# Patient Record
Sex: FEMALE | Race: WHITE | Employment: FULL TIME | URBAN - METROPOLITAN AREA
[De-identification: names, ages, dates, MRNs, and addresses within clinical notes are randomized per-mention and may not be internally consistent; named-entity substitution may affect disease eponyms.]

---

## 2017-04-08 ENCOUNTER — HOSPITAL ENCOUNTER (OUTPATIENT)
Age: 52
Discharge: HOME | End: 2017-04-08
Payer: COMMERCIAL

## 2017-04-08 LAB
-: ABNORMAL
ABSOLUTE EOS #: 0.2 K/UL (ref 0–0.4)
ABSOLUTE LYMPH #: 1.6 K/UL (ref 1–4.8)
ABSOLUTE MONO #: 0.4 K/UL (ref 0.2–0.8)
ALBUMIN SERPL-MCNC: 4.5 G/DL (ref 3.5–5.2)
ALBUMIN/GLOBULIN RATIO: ABNORMAL (ref 1–2.5)
ALP BLD-CCNC: 71 U/L (ref 35–104)
ALT SERPL-CCNC: 17 U/L (ref 5–33)
AMORPHOUS: ABNORMAL
ANION GAP SERPL CALCULATED.3IONS-SCNC: 10 MMOL/L (ref 9–17)
AST SERPL-CCNC: 22 U/L
BACTERIA: ABNORMAL
BASOPHILS # BLD: 0 % (ref 0–2)
BASOPHILS ABSOLUTE: 0 K/UL (ref 0–0.2)
BILIRUB SERPL-MCNC: 0.29 MG/DL (ref 0.3–1.2)
BILIRUBIN URINE: NEGATIVE
BUN BLDV-MCNC: 18 MG/DL (ref 6–20)
BUN/CREAT BLD: 24 (ref 9–20)
CALCIUM SERPL-MCNC: 9.7 MG/DL (ref 8.6–10.4)
CASTS UA: ABNORMAL /LPF
CHLORIDE BLD-SCNC: 104 MMOL/L (ref 98–107)
CHOLESTEROL/HDL RATIO: 2.7
CHOLESTEROL: 191 MG/DL
CO2: 28 MMOL/L (ref 20–31)
COLOR: YELLOW
COMMENT UA: ABNORMAL
CREAT SERPL-MCNC: 0.76 MG/DL (ref 0.5–0.9)
CRYSTALS, UA: ABNORMAL /HPF
DIFFERENTIAL TYPE: ABNORMAL
EOSINOPHILS RELATIVE PERCENT: 4 % (ref 1–4)
EPITHELIAL CELLS UA: ABNORMAL /HPF
GFR AFRICAN AMERICAN: >60 ML/MIN
GFR NON-AFRICAN AMERICAN: >60 ML/MIN
GFR SERPL CREATININE-BSD FRML MDRD: ABNORMAL ML/MIN/{1.73_M2}
GFR SERPL CREATININE-BSD FRML MDRD: ABNORMAL ML/MIN/{1.73_M2}
GLUCOSE BLD-MCNC: 92 MG/DL (ref 70–99)
GLUCOSE URINE: NEGATIVE
HCT VFR BLD CALC: 43.2 % (ref 36–46)
HDLC SERPL-MCNC: 70 MG/DL
HEMOGLOBIN: 14.4 G/DL (ref 12–16)
KETONES, URINE: NEGATIVE
LDL CHOLESTEROL: 114 MG/DL (ref 0–130)
LEUKOCYTE ESTERASE, URINE: ABNORMAL
LYMPHOCYTES # BLD: 36 % (ref 24–44)
MCH RBC QN AUTO: 27.8 PG (ref 26–34)
MCHC RBC AUTO-ENTMCNC: 33.2 G/DL (ref 31–37)
MCV RBC AUTO: 83.7 FL (ref 80–100)
MONOCYTES # BLD: 10 % (ref 1–7)
MUCUS: ABNORMAL
NITRITE, URINE: NEGATIVE
OTHER OBSERVATIONS UA: ABNORMAL
PDW BLD-RTO: 16 % (ref 11.5–14.5)
PH UA: 7 (ref 5–8)
PLATELET # BLD: 266 K/UL (ref 130–400)
PLATELET ESTIMATE: ABNORMAL
PMV BLD AUTO: 8.3 FL (ref 6–12)
POTASSIUM SERPL-SCNC: 4.4 MMOL/L (ref 3.7–5.3)
PROTEIN UA: NEGATIVE
RBC # BLD: 5.16 M/UL (ref 4–5.2)
RBC # BLD: ABNORMAL 10*6/UL
RBC UA: ABNORMAL /HPF (ref 0–2)
RENAL EPITHELIAL, UA: ABNORMAL /HPF
SEG NEUTROPHILS: 50 % (ref 36–66)
SEGMENTED NEUTROPHILS ABSOLUTE COUNT: 2.3 K/UL (ref 1.8–7.7)
SODIUM BLD-SCNC: 142 MMOL/L (ref 135–144)
SPECIFIC GRAVITY UA: 1.01 (ref 1–1.03)
TOTAL CK: 192 U/L (ref 26–192)
TOTAL PROTEIN: 6.9 G/DL (ref 6.4–8.3)
TRICHOMONAS: ABNORMAL
TRIGL SERPL-MCNC: 34 MG/DL
TSH SERPL DL<=0.05 MIU/L-ACNC: 3.36 MIU/L (ref 0.3–5)
TURBIDITY: CLEAR
URINE HGB: ABNORMAL
UROBILINOGEN, URINE: NORMAL
VLDLC SERPL CALC-MCNC: NORMAL MG/DL (ref 1–30)
WBC # BLD: 4.6 K/UL (ref 3.5–11)
WBC # BLD: ABNORMAL 10*3/UL
WBC UA: ABNORMAL /HPF (ref 0–5)
YEAST: ABNORMAL

## 2017-04-08 PROCEDURE — 36415 COLL VENOUS BLD VENIPUNCTURE: CPT

## 2017-04-08 PROCEDURE — 84443 ASSAY THYROID STIM HORMONE: CPT

## 2017-04-08 PROCEDURE — 80061 LIPID PANEL: CPT

## 2017-04-08 PROCEDURE — 81001 URINALYSIS AUTO W/SCOPE: CPT

## 2017-04-08 PROCEDURE — 85025 COMPLETE CBC W/AUTO DIFF WBC: CPT

## 2017-04-08 PROCEDURE — 80053 COMPREHEN METABOLIC PANEL: CPT

## 2017-04-08 PROCEDURE — 82550 ASSAY OF CK (CPK): CPT

## 2017-04-13 ENCOUNTER — PATIENT MESSAGE (OUTPATIENT)
Dept: OBGYN | Facility: CLINIC | Age: 52
End: 2017-04-13

## 2017-04-13 DIAGNOSIS — Z15.89 METHYLENETETRAHYDROFOLATE REDUCTASE (MTHFR) GENE MUTATION: ICD-10-CM

## 2017-04-13 DIAGNOSIS — Z12.39 BREAST CANCER SCREENING: Primary | ICD-10-CM

## 2017-05-25 LAB
CHOLESTEROL/HDL RATIO: 3.4
CHOLESTEROL: 206 MG/DL
GLUCOSE BLD-MCNC: 99 MG/DL (ref 70–99)
HDLC SERPL-MCNC: 60 MG/DL
LDL CHOLESTEROL: 132 MG/DL (ref 0–130)
PATIENT FASTING?: YES
TRIGL SERPL-MCNC: 70 MG/DL
VLDLC SERPL CALC-MCNC: ABNORMAL MG/DL (ref 1–30)

## 2017-07-27 ENCOUNTER — HOSPITAL ENCOUNTER (OUTPATIENT)
Dept: MAMMOGRAPHY | Age: 52
Discharge: HOME | End: 2017-07-27
Payer: COMMERCIAL

## 2017-07-27 DIAGNOSIS — Z12.39 BREAST CANCER SCREENING: ICD-10-CM

## 2017-07-27 PROCEDURE — 77063 BREAST TOMOSYNTHESIS BI: CPT

## 2017-08-02 DIAGNOSIS — R92.2 INCONCLUSIVE MAMMOGRAM: Primary | ICD-10-CM

## 2017-08-02 PROBLEM — R92.8 MAMMOGRAM ABNORMAL: Status: ACTIVE | Noted: 2017-08-02

## 2017-08-11 ENCOUNTER — HOSPITAL ENCOUNTER (OUTPATIENT)
Dept: ULTRASOUND IMAGING | Age: 52
Discharge: HOME | End: 2017-08-11
Payer: COMMERCIAL

## 2017-08-11 ENCOUNTER — HOSPITAL ENCOUNTER (OUTPATIENT)
Dept: MAMMOGRAPHY | Age: 52
Discharge: HOME | End: 2017-08-11
Payer: COMMERCIAL

## 2017-08-11 DIAGNOSIS — R92.2 INCONCLUSIVE MAMMOGRAM: ICD-10-CM

## 2017-08-11 DIAGNOSIS — R92.8 ABNORMAL MAMMOGRAM: ICD-10-CM

## 2017-08-11 PROCEDURE — 76642 ULTRASOUND BREAST LIMITED: CPT

## 2017-08-11 PROCEDURE — G0204 DX MAMMO INCL CAD BI: HCPCS

## 2017-09-21 ENCOUNTER — HOSPITAL ENCOUNTER (OUTPATIENT)
Dept: GENERAL RADIOLOGY | Age: 52
Discharge: HOME | End: 2017-09-21
Payer: COMMERCIAL

## 2017-09-21 ENCOUNTER — HOSPITAL ENCOUNTER (OUTPATIENT)
Age: 52
Discharge: HOME | End: 2017-09-21
Payer: COMMERCIAL

## 2017-09-21 DIAGNOSIS — Z96.653 PRESENCE OF BOTH ARTIFICIAL KNEE JOINTS: ICD-10-CM

## 2017-09-21 PROCEDURE — 73565 X-RAY EXAM OF KNEES: CPT

## 2017-09-21 PROCEDURE — 73562 X-RAY EXAM OF KNEE 3: CPT

## 2017-10-03 ENCOUNTER — HOSPITAL ENCOUNTER (OUTPATIENT)
Dept: GENERAL RADIOLOGY | Age: 52
Discharge: HOME | End: 2017-10-03
Payer: COMMERCIAL

## 2017-10-03 ENCOUNTER — HOSPITAL ENCOUNTER (OUTPATIENT)
Age: 52
Discharge: HOME | End: 2017-10-03
Payer: COMMERCIAL

## 2017-10-03 DIAGNOSIS — M54.5 LOW BACK PAIN, UNSPECIFIED BACK PAIN LATERALITY, UNSPECIFIED CHRONICITY, WITH SCIATICA PRESENCE UNSPECIFIED: ICD-10-CM

## 2017-10-03 PROCEDURE — 72100 X-RAY EXAM L-S SPINE 2/3 VWS: CPT

## 2017-10-03 PROCEDURE — 72170 X-RAY EXAM OF PELVIS: CPT

## 2018-01-05 ENCOUNTER — NURSE TRIAGE (OUTPATIENT)
Dept: OTHER | Facility: CLINIC | Age: 53
End: 2018-01-05

## 2018-03-31 ENCOUNTER — HOSPITAL ENCOUNTER (OUTPATIENT)
Age: 53
Discharge: HOME | End: 2018-03-31
Payer: COMMERCIAL

## 2018-03-31 LAB
-: ABNORMAL
ABSOLUTE EOS #: 0.2 K/UL (ref 0–0.4)
ABSOLUTE IMMATURE GRANULOCYTE: ABNORMAL K/UL (ref 0–0.3)
ABSOLUTE LYMPH #: 1.3 K/UL (ref 1–4.8)
ABSOLUTE MONO #: 0.4 K/UL (ref 0.2–0.8)
ALBUMIN SERPL-MCNC: 4.6 G/DL (ref 3.5–5.2)
ALBUMIN/GLOBULIN RATIO: ABNORMAL (ref 1–2.5)
ALP BLD-CCNC: 92 U/L (ref 35–104)
ALT SERPL-CCNC: 16 U/L (ref 5–33)
AMORPHOUS: ABNORMAL
ANION GAP SERPL CALCULATED.3IONS-SCNC: 11 MMOL/L (ref 9–17)
AST SERPL-CCNC: 21 U/L
BACTERIA: ABNORMAL
BASOPHILS # BLD: 1 % (ref 0–2)
BASOPHILS ABSOLUTE: 0 K/UL (ref 0–0.2)
BILIRUB SERPL-MCNC: 0.45 MG/DL (ref 0.3–1.2)
BILIRUBIN URINE: NEGATIVE
BUN BLDV-MCNC: 25 MG/DL (ref 6–20)
BUN/CREAT BLD: 34 (ref 9–20)
C-REACTIVE PROTEIN: <0.3 MG/L (ref 0–5)
CALCIUM SERPL-MCNC: 9.7 MG/DL (ref 8.6–10.4)
CASTS UA: ABNORMAL /LPF
CHLORIDE BLD-SCNC: 104 MMOL/L (ref 98–107)
CHOLESTEROL, FASTING: 211 MG/DL
CHOLESTEROL/HDL RATIO: 2.7
CO2: 25 MMOL/L (ref 20–31)
COLOR: YELLOW
COMMENT UA: ABNORMAL
CREAT SERPL-MCNC: 0.73 MG/DL (ref 0.5–0.9)
CRYSTALS, UA: ABNORMAL /HPF
DIFFERENTIAL TYPE: ABNORMAL
EOSINOPHILS RELATIVE PERCENT: 3 % (ref 1–4)
EPITHELIAL CELLS UA: ABNORMAL /HPF (ref 0–5)
GFR AFRICAN AMERICAN: >60 ML/MIN
GFR NON-AFRICAN AMERICAN: >60 ML/MIN
GFR SERPL CREATININE-BSD FRML MDRD: ABNORMAL ML/MIN/{1.73_M2}
GFR SERPL CREATININE-BSD FRML MDRD: ABNORMAL ML/MIN/{1.73_M2}
GLUCOSE FASTING: 87 MG/DL (ref 70–99)
GLUCOSE URINE: NEGATIVE
HCT VFR BLD CALC: 45.5 % (ref 36–46)
HDLC SERPL-MCNC: 78 MG/DL
HEMOGLOBIN: 14.8 G/DL (ref 12–16)
IMMATURE GRANULOCYTES: ABNORMAL %
KETONES, URINE: NEGATIVE
LDL CHOLESTEROL: 125 MG/DL (ref 0–130)
LEUKOCYTE ESTERASE, URINE: ABNORMAL
LYMPHOCYTES # BLD: 28 % (ref 24–44)
MCH RBC QN AUTO: 27.3 PG (ref 26–34)
MCHC RBC AUTO-ENTMCNC: 32.5 G/DL (ref 31–37)
MCV RBC AUTO: 83.8 FL (ref 80–100)
MONOCYTES # BLD: 8 % (ref 1–7)
MUCUS: ABNORMAL
NITRITE, URINE: NEGATIVE
NRBC AUTOMATED: ABNORMAL PER 100 WBC
OTHER OBSERVATIONS UA: ABNORMAL
PDW BLD-RTO: 14.4 % (ref 11.5–14.5)
PH UA: 7 (ref 5–8)
PLATELET # BLD: 290 K/UL (ref 130–400)
PLATELET ESTIMATE: ABNORMAL
PMV BLD AUTO: ABNORMAL FL (ref 6–12)
POTASSIUM SERPL-SCNC: 4.6 MMOL/L (ref 3.7–5.3)
PROTEIN UA: NEGATIVE
RBC # BLD: 5.43 M/UL (ref 4–5.2)
RBC # BLD: ABNORMAL 10*6/UL
RBC UA: ABNORMAL /HPF (ref 0–2)
RENAL EPITHELIAL, UA: ABNORMAL /HPF
RHEUMATOID FACTOR: <10 IU/ML
SEDIMENTATION RATE, ERYTHROCYTE: 1 MM (ref 0–20)
SEG NEUTROPHILS: 60 % (ref 36–66)
SEGMENTED NEUTROPHILS ABSOLUTE COUNT: 2.9 K/UL (ref 1.8–7.7)
SODIUM BLD-SCNC: 140 MMOL/L (ref 135–144)
SPECIFIC GRAVITY UA: 1.01 (ref 1–1.03)
TOTAL PROTEIN: 6.6 G/DL (ref 6.4–8.3)
TRICHOMONAS: ABNORMAL
TRIGLYCERIDE, FASTING: 41 MG/DL
TURBIDITY: ABNORMAL
URIC ACID: 4 MG/DL (ref 2.4–5.7)
URINE HGB: ABNORMAL
UROBILINOGEN, URINE: NORMAL
VITAMIN D 25-HYDROXY: 17.7 NG/ML (ref 30–100)
VLDLC SERPL CALC-MCNC: ABNORMAL MG/DL (ref 1–30)
WBC # BLD: 4.8 K/UL (ref 3.5–11)
WBC # BLD: ABNORMAL 10*3/UL
WBC UA: ABNORMAL /HPF (ref 0–5)
YEAST: ABNORMAL

## 2018-03-31 PROCEDURE — 80061 LIPID PANEL: CPT

## 2018-03-31 PROCEDURE — 86140 C-REACTIVE PROTEIN: CPT

## 2018-03-31 PROCEDURE — 81001 URINALYSIS AUTO W/SCOPE: CPT

## 2018-03-31 PROCEDURE — 84550 ASSAY OF BLOOD/URIC ACID: CPT

## 2018-03-31 PROCEDURE — 85651 RBC SED RATE NONAUTOMATED: CPT

## 2018-03-31 PROCEDURE — 86038 ANTINUCLEAR ANTIBODIES: CPT

## 2018-03-31 PROCEDURE — 82306 VITAMIN D 25 HYDROXY: CPT

## 2018-03-31 PROCEDURE — 85025 COMPLETE CBC W/AUTO DIFF WBC: CPT

## 2018-03-31 PROCEDURE — 80053 COMPREHEN METABOLIC PANEL: CPT

## 2018-03-31 PROCEDURE — 86431 RHEUMATOID FACTOR QUANT: CPT

## 2018-03-31 PROCEDURE — 36415 COLL VENOUS BLD VENIPUNCTURE: CPT

## 2018-04-03 LAB — ANTI-NUCLEAR ANTIBODY (ANA): NEGATIVE

## 2018-05-11 ENCOUNTER — EMPLOYEE WELLNESS (OUTPATIENT)
Dept: OTHER | Age: 53
End: 2018-05-11

## 2018-05-11 LAB
CHOLESTEROL/HDL RATIO: 3
CHOLESTEROL: 222 MG/DL
GLUCOSE BLD-MCNC: 91 MG/DL (ref 70–99)
HDLC SERPL-MCNC: 74 MG/DL
LDL CHOLESTEROL: 134 MG/DL (ref 0–130)
PATIENT FASTING?: ABNORMAL
TRIGL SERPL-MCNC: 70 MG/DL
VLDLC SERPL CALC-MCNC: ABNORMAL MG/DL (ref 1–30)

## 2018-05-21 VITALS — BODY MASS INDEX: 21.95 KG/M2 | WEIGHT: 136 LBS

## 2018-09-07 ENCOUNTER — HOSPITAL ENCOUNTER (OUTPATIENT)
Age: 53
Discharge: HOME | End: 2018-09-09
Payer: COMMERCIAL

## 2018-09-07 ENCOUNTER — HOSPITAL ENCOUNTER (OUTPATIENT)
Dept: GENERAL RADIOLOGY | Age: 53
Discharge: HOME | End: 2018-09-09
Payer: COMMERCIAL

## 2018-09-07 DIAGNOSIS — Z96.653 PRESENCE OF BOTH ARTIFICIAL KNEE JOINTS: ICD-10-CM

## 2018-09-07 DIAGNOSIS — M25.551 RIGHT HIP PAIN: ICD-10-CM

## 2018-09-07 PROCEDURE — 73565 X-RAY EXAM OF KNEES: CPT

## 2018-09-07 PROCEDURE — 73502 X-RAY EXAM HIP UNI 2-3 VIEWS: CPT

## 2018-09-07 PROCEDURE — 73564 X-RAY EXAM KNEE 4 OR MORE: CPT

## 2019-02-03 ENCOUNTER — NURSE TRIAGE (OUTPATIENT)
Dept: OTHER | Facility: CLINIC | Age: 54
End: 2019-02-03

## 2019-05-25 ENCOUNTER — HOSPITAL ENCOUNTER (OUTPATIENT)
Age: 54
Discharge: HOME | End: 2019-05-25
Payer: COMMERCIAL

## 2019-05-25 LAB
-: NORMAL
ABSOLUTE EOS #: 1.24 K/UL (ref 0–0.44)
ABSOLUTE IMMATURE GRANULOCYTE: <0.03 K/UL (ref 0–0.3)
ABSOLUTE LYMPH #: 2 K/UL (ref 1.1–3.7)
ABSOLUTE MONO #: 0.6 K/UL (ref 0.1–1.2)
ALBUMIN SERPL-MCNC: 4.1 G/DL (ref 3.5–5.2)
ALBUMIN/GLOBULIN RATIO: 2.2 (ref 1–2.5)
ALP BLD-CCNC: 74 U/L (ref 35–104)
ALT SERPL-CCNC: 16 U/L (ref 5–33)
AMORPHOUS: NORMAL
ANION GAP SERPL CALCULATED.3IONS-SCNC: 11 MMOL/L (ref 9–17)
AST SERPL-CCNC: 20 U/L
BACTERIA: NORMAL
BASOPHILS # BLD: 1 % (ref 0–2)
BASOPHILS ABSOLUTE: 0.05 K/UL (ref 0–0.2)
BILIRUB SERPL-MCNC: 0.31 MG/DL (ref 0.3–1.2)
BILIRUBIN URINE: NEGATIVE
BUN BLDV-MCNC: 26 MG/DL (ref 6–20)
BUN/CREAT BLD: ABNORMAL (ref 9–20)
CALCIUM SERPL-MCNC: 9.5 MG/DL (ref 8.6–10.4)
CASTS UA: NORMAL /LPF (ref 0–8)
CHLORIDE BLD-SCNC: 106 MMOL/L (ref 98–107)
CHOLESTEROL, FASTING: 180 MG/DL
CHOLESTEROL/HDL RATIO: 3.7
CO2: 25 MMOL/L (ref 20–31)
COLOR: YELLOW
COMMENT UA: ABNORMAL
CREAT SERPL-MCNC: 0.68 MG/DL (ref 0.5–0.9)
CRYSTALS, UA: NORMAL /HPF
DIFFERENTIAL TYPE: ABNORMAL
EOSINOPHILS RELATIVE PERCENT: 15 % (ref 1–4)
EPITHELIAL CELLS UA: NORMAL /HPF (ref 0–5)
GFR AFRICAN AMERICAN: >60 ML/MIN
GFR NON-AFRICAN AMERICAN: >60 ML/MIN
GFR SERPL CREATININE-BSD FRML MDRD: ABNORMAL ML/MIN/{1.73_M2}
GFR SERPL CREATININE-BSD FRML MDRD: ABNORMAL ML/MIN/{1.73_M2}
GLUCOSE BLD-MCNC: 85 MG/DL (ref 70–99)
GLUCOSE URINE: NEGATIVE
HCT VFR BLD CALC: 49.2 % (ref 36.3–47.1)
HDLC SERPL-MCNC: 49 MG/DL
HEMOGLOBIN: 14.9 G/DL (ref 11.9–15.1)
IMMATURE GRANULOCYTES: 0 %
KETONES, URINE: NEGATIVE
LDL CHOLESTEROL: 122 MG/DL (ref 0–130)
LEUKOCYTE ESTERASE, URINE: ABNORMAL
LYMPHOCYTES # BLD: 24 % (ref 24–43)
MCH RBC QN AUTO: 28.2 PG (ref 25.2–33.5)
MCHC RBC AUTO-ENTMCNC: 30.3 G/DL (ref 28.4–34.8)
MCV RBC AUTO: 93.2 FL (ref 82.6–102.9)
MONOCYTES # BLD: 7 % (ref 3–12)
MUCUS: NORMAL
NITRITE, URINE: NEGATIVE
NRBC AUTOMATED: 0 PER 100 WBC
OTHER OBSERVATIONS UA: NORMAL
PDW BLD-RTO: 13 % (ref 11.8–14.4)
PH UA: 6 (ref 5–8)
PLATELET # BLD: 282 K/UL (ref 138–453)
PLATELET ESTIMATE: ABNORMAL
PMV BLD AUTO: 10.2 FL (ref 8.1–13.5)
POTASSIUM SERPL-SCNC: 4.3 MMOL/L (ref 3.7–5.3)
PROTEIN UA: NEGATIVE
RBC # BLD: 5.28 M/UL (ref 3.95–5.11)
RBC # BLD: ABNORMAL 10*6/UL
RBC UA: NORMAL /HPF (ref 0–4)
RENAL EPITHELIAL, UA: NORMAL /HPF
SEG NEUTROPHILS: 53 % (ref 36–65)
SEGMENTED NEUTROPHILS ABSOLUTE COUNT: 4.48 K/UL (ref 1.5–8.1)
SODIUM BLD-SCNC: 142 MMOL/L (ref 135–144)
SPECIFIC GRAVITY UA: 1.02 (ref 1–1.03)
TOTAL PROTEIN: 6 G/DL (ref 6.4–8.3)
TRICHOMONAS: NORMAL
TRIGLYCERIDE, FASTING: 47 MG/DL
TURBIDITY: CLEAR
URINE HGB: ABNORMAL
UROBILINOGEN, URINE: NORMAL
VITAMIN D 25-HYDROXY: 20.2 NG/ML (ref 30–100)
VLDLC SERPL CALC-MCNC: NORMAL MG/DL (ref 1–30)
WBC # BLD: 8.4 K/UL (ref 3.5–11.3)
WBC # BLD: ABNORMAL 10*3/UL
WBC UA: NORMAL /HPF (ref 0–5)
YEAST: NORMAL

## 2019-05-25 PROCEDURE — 36415 COLL VENOUS BLD VENIPUNCTURE: CPT

## 2019-05-25 PROCEDURE — 82306 VITAMIN D 25 HYDROXY: CPT

## 2019-05-25 PROCEDURE — 85025 COMPLETE CBC W/AUTO DIFF WBC: CPT

## 2019-05-25 PROCEDURE — 80061 LIPID PANEL: CPT

## 2019-05-25 PROCEDURE — 80053 COMPREHEN METABOLIC PANEL: CPT

## 2019-05-25 PROCEDURE — 81001 URINALYSIS AUTO W/SCOPE: CPT

## 2019-05-30 ENCOUNTER — HOSPITAL ENCOUNTER (OUTPATIENT)
Age: 54
Setting detail: SPECIMEN
Discharge: HOME | End: 2019-05-30
Payer: COMMERCIAL

## 2019-05-30 LAB
DATE, STOOL #1: 1600
DATE, STOOL #1: 600
DATE, STOOL #1: NORMAL
DATE, STOOL #2: NORMAL
DATE, STOOL #3: NORMAL
HEMOCCULT SP1 STL QL: NEGATIVE
HEMOCCULT SP2 STL QL: NORMAL
HEMOCCULT SP3 STL QL: NORMAL
TIME, STOOL #1: 1800
TIME, STOOL #1: NORMAL
TIME, STOOL #1: NORMAL
TIME, STOOL #2: NORMAL
TIME, STOOL #3: NORMAL

## 2019-05-30 PROCEDURE — 82272 OCCULT BLD FECES 1-3 TESTS: CPT

## 2019-06-18 ENCOUNTER — EMPLOYEE WELLNESS (OUTPATIENT)
Dept: OTHER | Age: 54
End: 2019-06-18

## 2019-06-18 LAB
CHOLESTEROL/HDL RATIO: 3.6
CHOLESTEROL: 208 MG/DL
GLUCOSE BLD-MCNC: 97 MG/DL (ref 70–99)
HDLC SERPL-MCNC: 57 MG/DL
LDL CHOLESTEROL: 136 MG/DL (ref 0–130)
PATIENT FASTING?: YES
TRIGL SERPL-MCNC: 73 MG/DL
VLDLC SERPL CALC-MCNC: ABNORMAL MG/DL (ref 1–30)

## 2019-06-25 ENCOUNTER — HOSPITAL ENCOUNTER (OUTPATIENT)
Dept: MAMMOGRAPHY | Age: 54
Discharge: HOME | End: 2019-06-27
Payer: COMMERCIAL

## 2019-06-25 DIAGNOSIS — Z12.31 VISIT FOR SCREENING MAMMOGRAM: ICD-10-CM

## 2019-06-25 PROCEDURE — 77063 BREAST TOMOSYNTHESIS BI: CPT

## 2019-07-01 VITALS — BODY MASS INDEX: 23.24 KG/M2 | WEIGHT: 144 LBS

## 2019-08-29 ENCOUNTER — HOSPITAL ENCOUNTER (OUTPATIENT)
Age: 54
Setting detail: SPECIMEN
Discharge: HOME | End: 2019-08-29
Payer: COMMERCIAL

## 2019-08-29 ENCOUNTER — OFFICE VISIT (OUTPATIENT)
Dept: OBGYN CLINIC | Age: 54
End: 2019-08-29
Payer: COMMERCIAL

## 2019-08-29 VITALS
WEIGHT: 141.7 LBS | HEIGHT: 65 IN | DIASTOLIC BLOOD PRESSURE: 64 MMHG | BODY MASS INDEX: 23.61 KG/M2 | SYSTOLIC BLOOD PRESSURE: 104 MMHG | HEART RATE: 83 BPM

## 2019-08-29 DIAGNOSIS — Z01.419 WOMEN'S ANNUAL ROUTINE GYNECOLOGICAL EXAMINATION: Primary | ICD-10-CM

## 2019-08-29 PROCEDURE — 99396 PREV VISIT EST AGE 40-64: CPT | Performed by: ADVANCED PRACTICE MIDWIFE

## 2019-08-29 ASSESSMENT — ENCOUNTER SYMPTOMS
VOMITING: 0
SHORTNESS OF BREATH: 0
NAUSEA: 0
ABDOMINAL PAIN: 0
DIARRHEA: 0

## 2019-09-05 LAB
HPV SAMPLE: NORMAL
HPV, GENOTYPE 16: NOT DETECTED
HPV, GENOTYPE 18: NOT DETECTED
HPV, HIGH RISK OTHER: NOT DETECTED
HPV, INTERPRETATION: NORMAL
SPECIMEN DESCRIPTION: NORMAL

## 2019-09-09 LAB — CYTOLOGY REPORT: NORMAL

## 2019-11-15 ENCOUNTER — HOSPITAL ENCOUNTER (OUTPATIENT)
Dept: ULTRASOUND IMAGING | Facility: CLINIC | Age: 54
Discharge: HOME | End: 2019-11-17
Payer: COMMERCIAL

## 2019-11-15 DIAGNOSIS — M79.661 PAIN IN RIGHT LOWER LEG: ICD-10-CM

## 2019-11-15 DIAGNOSIS — M79.662 PAIN IN LEFT LOWER LEG: ICD-10-CM

## 2019-11-15 PROCEDURE — 93970 EXTREMITY STUDY: CPT

## 2019-11-18 ENCOUNTER — HOSPITAL ENCOUNTER (OUTPATIENT)
Age: 54
Setting detail: SPECIMEN
Discharge: HOME | End: 2019-11-18
Payer: COMMERCIAL

## 2019-11-18 ENCOUNTER — OFFICE VISIT (OUTPATIENT)
Dept: ORTHOPEDIC SURGERY | Age: 54
End: 2019-11-18
Payer: COMMERCIAL

## 2019-11-18 VITALS
HEART RATE: 75 BPM | BODY MASS INDEX: 23.82 KG/M2 | WEIGHT: 143 LBS | SYSTOLIC BLOOD PRESSURE: 126 MMHG | HEIGHT: 65 IN | DIASTOLIC BLOOD PRESSURE: 86 MMHG

## 2019-11-18 DIAGNOSIS — M66.0 BAKER'S CYST, RUPTURED: Primary | ICD-10-CM

## 2019-11-18 DIAGNOSIS — Z96.653 HISTORY OF BILATERAL KNEE REPLACEMENT: ICD-10-CM

## 2019-11-18 LAB
C-REACTIVE PROTEIN: 10.5 MG/L (ref 0–5)
HCT VFR BLD CALC: 46.1 % (ref 36.3–47.1)
HEMOGLOBIN: 14.9 G/DL (ref 11.9–15.1)
MCH RBC QN AUTO: 29.1 PG (ref 25.2–33.5)
MCHC RBC AUTO-ENTMCNC: 32.3 G/DL (ref 28.4–34.8)
MCV RBC AUTO: 90 FL (ref 82.6–102.9)
NRBC AUTOMATED: 0 PER 100 WBC
PDW BLD-RTO: 12.7 % (ref 11.8–14.4)
PLATELET # BLD: 326 K/UL (ref 138–453)
PMV BLD AUTO: 10.3 FL (ref 8.1–13.5)
RBC # BLD: 5.12 M/UL (ref 3.95–5.11)
SEDIMENTATION RATE, ERYTHROCYTE: 6 MM (ref 0–20)
WBC # BLD: 9.1 K/UL (ref 3.5–11.3)

## 2019-11-18 PROCEDURE — 99213 OFFICE O/P EST LOW 20 MIN: CPT | Performed by: ORTHOPAEDIC SURGERY

## 2019-11-18 RX ORDER — MELOXICAM 15 MG/1
15 TABLET ORAL DAILY
COMMUNITY

## 2019-11-18 ASSESSMENT — ENCOUNTER SYMPTOMS
APNEA: 0
DIARRHEA: 0
CHEST TIGHTNESS: 0
ABDOMINAL DISTENTION: 0
COUGH: 0
CONSTIPATION: 0
SHORTNESS OF BREATH: 0
NAUSEA: 0
ABDOMINAL PAIN: 0
COLOR CHANGE: 0
VOMITING: 0

## 2019-11-20 ENCOUNTER — TELEPHONE (OUTPATIENT)
Dept: ORTHOPEDIC SURGERY | Age: 54
End: 2019-11-20

## 2019-11-20 DIAGNOSIS — Z96.653 HISTORY OF BILATERAL KNEE REPLACEMENT: Primary | ICD-10-CM

## 2019-11-27 ENCOUNTER — HOSPITAL ENCOUNTER (OUTPATIENT)
Dept: NUCLEAR MEDICINE | Age: 54
Discharge: HOME | End: 2019-11-29
Payer: COMMERCIAL

## 2019-11-27 DIAGNOSIS — Z96.653 HISTORY OF BILATERAL KNEE REPLACEMENT: ICD-10-CM

## 2019-11-27 PROCEDURE — 3430000000 HC RX DIAGNOSTIC RADIOPHARMACEUTICAL: Performed by: ORTHOPAEDIC SURGERY

## 2019-11-27 PROCEDURE — A9503 TC99M MEDRONATE: HCPCS | Performed by: ORTHOPAEDIC SURGERY

## 2019-11-27 PROCEDURE — 78315 BONE IMAGING 3 PHASE: CPT

## 2019-11-27 RX ORDER — TC 99M MEDRONATE 20 MG/10ML
25 INJECTION, POWDER, LYOPHILIZED, FOR SOLUTION INTRAVENOUS
Status: COMPLETED | OUTPATIENT
Start: 2019-11-27 | End: 2019-11-27

## 2019-11-27 RX ADMIN — TC 99M MEDRONATE 24.3 MILLICURIE: 20 INJECTION, POWDER, LYOPHILIZED, FOR SOLUTION INTRAVENOUS at 11:30

## 2019-12-02 ENCOUNTER — TELEPHONE (OUTPATIENT)
Dept: ORTHOPEDIC SURGERY | Age: 54
End: 2019-12-02

## 2019-12-02 DIAGNOSIS — Z96.653 HISTORY OF BILATERAL KNEE REPLACEMENT: Primary | ICD-10-CM

## 2019-12-10 ENCOUNTER — HOSPITAL ENCOUNTER (OUTPATIENT)
Dept: NUCLEAR MEDICINE | Age: 54
Discharge: HOME | End: 2019-12-12
Payer: COMMERCIAL

## 2019-12-10 DIAGNOSIS — Z96.653 HISTORY OF BILATERAL KNEE REPLACEMENT: ICD-10-CM

## 2019-12-10 PROCEDURE — A9547 IN111 OXYQUINOLINE: HCPCS | Performed by: ORTHOPAEDIC SURGERY

## 2019-12-10 PROCEDURE — 3430000000 HC RX DIAGNOSTIC RADIOPHARMACEUTICAL: Performed by: ORTHOPAEDIC SURGERY

## 2019-12-10 PROCEDURE — 78805 NM INFLAMMATORY WBC LIMITED W INDIUM 111: CPT

## 2019-12-10 RX ADMIN — INDIUM IN-111 OXYQUINOLINE 0.5 MILLICURIE: 1 SOLUTION INTRAVENOUS at 11:15

## 2019-12-11 ENCOUNTER — HOSPITAL ENCOUNTER (OUTPATIENT)
Dept: NUCLEAR MEDICINE | Age: 54
Discharge: HOME | End: 2019-12-13
Payer: COMMERCIAL

## 2019-12-11 DIAGNOSIS — M25.562 PAIN IN BOTH KNEES, UNSPECIFIED CHRONICITY: ICD-10-CM

## 2019-12-11 DIAGNOSIS — Z96.653 HISTORY OF BILATERAL KNEE REPLACEMENT: ICD-10-CM

## 2019-12-11 DIAGNOSIS — M25.561 PAIN IN BOTH KNEES, UNSPECIFIED CHRONICITY: ICD-10-CM

## 2019-12-11 PROCEDURE — 78102 BONE MARROW IMAGING LTD: CPT

## 2019-12-11 PROCEDURE — A9541 TC99M SULFUR COLLOID: HCPCS | Performed by: ORTHOPAEDIC SURGERY

## 2019-12-11 PROCEDURE — 3430000000 HC RX DIAGNOSTIC RADIOPHARMACEUTICAL: Performed by: ORTHOPAEDIC SURGERY

## 2019-12-11 RX ORDER — INDIUM IN-111 OXYQUINOLINE 1 UG/ML
500 SOLUTION INTRAVENOUS
Status: COMPLETED | OUTPATIENT
Start: 2019-12-11 | End: 2019-12-10

## 2019-12-11 RX ADMIN — Medication 9 MILLICURIE: at 12:30

## 2019-12-12 ENCOUNTER — TELEPHONE (OUTPATIENT)
Dept: ORTHOPEDIC SURGERY | Age: 54
End: 2019-12-12

## 2020-01-02 ENCOUNTER — TELEPHONE (OUTPATIENT)
Dept: ORTHOPEDIC SURGERY | Age: 55
End: 2020-01-02

## 2020-01-02 RX ORDER — CEPHALEXIN 500 MG/1
CAPSULE ORAL
Qty: 12 CAPSULE | Refills: 0 | Status: SHIPPED | OUTPATIENT
Start: 2020-01-02 | End: 2020-04-20 | Stop reason: ALTCHOICE

## 2020-04-16 ENCOUNTER — HOSPITAL ENCOUNTER (OUTPATIENT)
Dept: VASCULAR LAB | Age: 55
Discharge: HOME | End: 2020-04-16
Payer: COMMERCIAL

## 2020-04-16 ENCOUNTER — NURSE TRIAGE (OUTPATIENT)
Dept: OTHER | Facility: CLINIC | Age: 55
End: 2020-04-16

## 2020-04-16 ENCOUNTER — TELEPHONE (OUTPATIENT)
Dept: ORTHOPEDIC SURGERY | Age: 55
End: 2020-04-16

## 2020-04-16 ENCOUNTER — HOSPITAL ENCOUNTER (OUTPATIENT)
Age: 55
Setting detail: SPECIMEN
Discharge: HOME | End: 2020-04-16
Payer: COMMERCIAL

## 2020-04-16 LAB
C-REACTIVE PROTEIN: 0.7 MG/L (ref 0–5)
D-DIMER QUANTITATIVE: 2.29 MG/L FEU (ref 0–0.59)
HCT VFR BLD CALC: 45.2 % (ref 36.3–47.1)
HEMOGLOBIN: 14.9 G/DL (ref 11.9–15.1)
MCH RBC QN AUTO: 28.6 PG (ref 25.2–33.5)
MCHC RBC AUTO-ENTMCNC: 33 G/DL (ref 28.4–34.8)
MCV RBC AUTO: 86.8 FL (ref 82.6–102.9)
NRBC AUTOMATED: 0 PER 100 WBC
PDW BLD-RTO: 12.9 % (ref 11.8–14.4)
PLATELET # BLD: 310 K/UL (ref 138–453)
PMV BLD AUTO: 10.3 FL (ref 8.1–13.5)
RBC # BLD: 5.21 M/UL (ref 3.95–5.11)
SEDIMENTATION RATE, ERYTHROCYTE: 3 MM (ref 0–20)
WBC # BLD: 8.6 K/UL (ref 3.5–11.3)

## 2020-04-16 PROCEDURE — 85027 COMPLETE CBC AUTOMATED: CPT

## 2020-04-16 PROCEDURE — 93971 EXTREMITY STUDY: CPT

## 2020-04-16 PROCEDURE — 85651 RBC SED RATE NONAUTOMATED: CPT

## 2020-04-16 PROCEDURE — 36415 COLL VENOUS BLD VENIPUNCTURE: CPT

## 2020-04-16 PROCEDURE — 86140 C-REACTIVE PROTEIN: CPT

## 2020-04-16 PROCEDURE — 85379 FIBRIN DEGRADATION QUANT: CPT

## 2020-04-20 ENCOUNTER — OFFICE VISIT (OUTPATIENT)
Dept: ORTHOPEDIC SURGERY | Age: 55
End: 2020-04-20
Payer: COMMERCIAL

## 2020-04-20 ENCOUNTER — TELEPHONE (OUTPATIENT)
Dept: ORTHOPEDIC SURGERY | Age: 55
End: 2020-04-20

## 2020-04-20 VITALS
HEART RATE: 83 BPM | SYSTOLIC BLOOD PRESSURE: 135 MMHG | WEIGHT: 140 LBS | BODY MASS INDEX: 23.32 KG/M2 | HEIGHT: 65 IN | DIASTOLIC BLOOD PRESSURE: 90 MMHG | TEMPERATURE: 97.4 F

## 2020-04-20 PROCEDURE — 99213 OFFICE O/P EST LOW 20 MIN: CPT | Performed by: PHYSICIAN ASSISTANT

## 2020-04-20 ASSESSMENT — ENCOUNTER SYMPTOMS
COLOR CHANGE: 0
ABDOMINAL PAIN: 0
APNEA: 0
RESPIRATORY NEGATIVE: 1
ABDOMINAL DISTENTION: 0
CHEST TIGHTNESS: 0
DIARRHEA: 0
SHORTNESS OF BREATH: 0
NAUSEA: 0
VOMITING: 0
CONSTIPATION: 0
COUGH: 0

## 2020-04-20 NOTE — TELEPHONE ENCOUNTER
Dr Winnie Olmos-    Could you please review Maryann's x-rays from today and compare to her x-ray in Nov. 2019? Could you please call her back later today at [phone].     Ale Dan PA-C

## 2020-04-20 NOTE — PROGRESS NOTES
strain: None    Food insecurity     Worry: None     Inability: None    Transportation needs     Medical: None     Non-medical: None   Tobacco Use    Smoking status: Never Smoker    Smokeless tobacco: Never Used   Substance and Sexual Activity    Alcohol use: Yes     Comment: social    Drug use: No    Sexual activity: Yes     Partners: Male   Lifestyle    Physical activity     Days per week: None     Minutes per session: None    Stress: None   Relationships    Social connections     Talks on phone: None     Gets together: None     Attends Confucianism service: None     Active member of club or organization: None     Attends meetings of clubs or organizations: None     Relationship status: None    Intimate partner violence     Fear of current or ex partner: None     Emotionally abused: None     Physically abused: None     Forced sexual activity: None   Other Topics Concern    None   Social History Narrative    ** Merged History Encounter **            Family History:  Family History   Problem Relation Age of Onset    Cancer Father         COPD, smoker,    Brower Diabetes Father     Hypertension Father     Brain Cancer Mother         brain tumor,     Thyroid Disease Mother     Diabetes Paternal Grandfather     Diabetes Paternal Grandmother     Osteoporosis Maternal Grandmother     Breast Cancer Maternal Grandmother     Stroke Maternal Grandfather     Arrhythmia Brother        Vitals:   BP (!) 135/90   Pulse 83   Temp 97.4 °F (36.3 °C)   Ht 5' 5\" (1.651 m)   Wt 140 lb (63.5 kg)   BMI 23.30 kg/m²  Body mass index is 23.3 kg/m². Physical Examination:     Orthopedics:    GENERAL: Alert and oriented X3 in no acute distress. SKIN: Intact without lesions or ulcerations. NEURO: Musculoskeletal and axillary nerves intact to sensory and motor testing. VASC: Capillary refill is less than 3 seconds.     KNEE EXAM    LOCATION: Right Knee  GEN: Alert and oriented X 3, in no acute experiencing any of these symptoms. Baker's cyst  Knee Injection    Location: Right Knee  Procedure: Corticosteroid injection into the Baker's cyst of the knee. The patient was placed in the prone position on the exam table. The Baker's cyst was identified and marked. The skin was prepped with betadine in a sterile fashion. Utilizing ultrasound for precise placement and clean technique with sterile gloves a .5cc solution containing 40mg of Depo-medrol was injected. There was no resistance to the injection. The wound was cleansed and a band-aid was placed. the patient tolerated the procedure without difficulty. Adverse reactions to the injection were discussed with the patient including signs of infection (increasing pain, redness, swelling) and the patient was instructed to call immediately if experiencing any of these symptoms. Radiology:   Vl Lower Extremity Venous Right  Conclusions: No evidence of superficial or deep venous thrombosis in the right lower  extremity. Baker's cyst noted in the right popliteal fossa. KNEE - TKA X-RAY    4 views of the right knee including AP, bilateral tunnel, and lateral in the upright position, and skyline views reveal anatomic alignment with no fracture or dislocation. There is a right total knee replacement in good alignment. There is some evidence of wearing/loosening of the prosthesis at the tibia and femoral components. Heterotopic bone formation noted near the anterior femoral component and posterior tibial component    Impression: Failure/loosening of the femoral and tibial component of the right knee . Plan:   Treatment : I reviewed the X-ray with the patient and I informed them that the there is some failure loosening of the femoral and tibial component that was seen previously on the bone scan.  We discussed the etiologies and natural histories of a Baker's cyst. We discussed the various treatment alternatives including anti-inflammatory medications,

## 2020-06-18 ENCOUNTER — OFFICE VISIT (OUTPATIENT)
Dept: ORTHOPEDIC SURGERY | Age: 55
End: 2020-06-18
Payer: COMMERCIAL

## 2020-06-18 VITALS
WEIGHT: 139.99 LBS | DIASTOLIC BLOOD PRESSURE: 77 MMHG | BODY MASS INDEX: 23.32 KG/M2 | SYSTOLIC BLOOD PRESSURE: 132 MMHG | HEIGHT: 65 IN | HEART RATE: 72 BPM

## 2020-06-18 PROCEDURE — 99213 OFFICE O/P EST LOW 20 MIN: CPT | Performed by: ORTHOPAEDIC SURGERY

## 2020-06-18 ASSESSMENT — ENCOUNTER SYMPTOMS
ABDOMINAL DISTENTION: 0
COUGH: 0
VOMITING: 0
DIARRHEA: 0
COLOR CHANGE: 0
CONSTIPATION: 0
CHEST TIGHTNESS: 0
ABDOMINAL PAIN: 0
SHORTNESS OF BREATH: 0
NAUSEA: 0
APNEA: 0

## 2020-06-18 NOTE — PROGRESS NOTES
Gastrointestinal: Negative for abdominal distention, abdominal pain, constipation, diarrhea, nausea and vomiting. Genitourinary: Negative for difficulty urinating, dysuria and hematuria. Musculoskeletal: Positive for arthralgias. Negative for gait problem, joint swelling and myalgias. Skin: Negative for color change and rash. Neurological: Negative for dizziness, weakness, numbness and headaches. Psychiatric/Behavioral: Negative for sleep disturbance.      Past Medical History:    Past Medical History:   Diagnosis Date    Abnormal Pap smear, low grade squamous intraepithelial lesion (LGSIL) 2004    Chronic headaches     Duodenal ulcer     history of ulcer over 1 yr ago (8/2016)    History of renal stone     passed    Melanoma of skin, site unspecified 2008    Malignant melanoma    MTHFR mutation ([facility] [address]     HETERO    Osteoarthritis of both knees     Pyloric stenosis     repaired as infant    Scoliosis      Past Surgical History:    Past Surgical History:   Procedure Laterality Date    EYE SURGERY      lazy eye    HYSTEROSCOPY  2008    KNEE ARTHROPLASTY Bilateral 09/21/2016    SKIN BIOPSY      SKIN CANCER EXCISION  2008     Current Medications:   Current Outpatient Medications   Medication Sig Dispense Refill    meloxicam (MOBIC) 15 MG tablet Take 15 mg by mouth daily      Levocetirizine Dihydrochloride (XYZAL PO) Take by mouth      folic acid-pyridoxine-cyanocobalamine (FOLTX) 2.5-25-1 MG TABS tablet Take 1 tablet by mouth daily 30 tablet 12    gabapentin (NEURONTIN) 100 MG capsule Take 1 capsule by mouth 3 times daily (Patient not taking: Reported on 8/29/2019) 90 capsule 3    ibuprofen (ADVIL) 200 MG tablet Take 200 mg by mouth every 6 hours as needed for Pain      acetaminophen (TYLENOL) 325 MG tablet Take 650 mg by mouth every 6 hours as needed for Pain      NONFORMULARY Iron over the counter 3 times daily      Prenatal Vit-Fe Fumarate-FA (PRENATAL VITAMINS PLUS) 27-1 MG TABS

## 2020-06-23 ENCOUNTER — TELEPHONE (OUTPATIENT)
Dept: ORTHOPEDIC SURGERY | Age: 55
End: 2020-06-23

## 2020-06-26 ENCOUNTER — OFFICE VISIT (OUTPATIENT)
Dept: ORTHOPEDIC SURGERY | Age: 55
End: 2020-06-26
Payer: COMMERCIAL

## 2020-06-26 VITALS — BODY MASS INDEX: 23.32 KG/M2 | HEIGHT: 65 IN | WEIGHT: 139.99 LBS

## 2020-06-26 PROCEDURE — 99213 OFFICE O/P EST LOW 20 MIN: CPT | Performed by: ORTHOPAEDIC SURGERY

## 2020-06-26 ASSESSMENT — ENCOUNTER SYMPTOMS
COUGH: 0
NAUSEA: 0
DIARRHEA: 0
CONSTIPATION: 0

## 2020-06-26 NOTE — PROGRESS NOTES
and have reviewed their documentation prior to providing my signature indicating agreement. Objective :   Ht 5' 5\" (1.651 m)   Wt 139 lb 15.9 oz (63.5 kg)   BMI 23.30 kg/m²  Body mass index is 23.3 kg/m². General: Saul Quijano is a 54 y.o. female who is alert and oriented and sitting comfortably in our office. Ortho Exam  MS: Evaluation of the right knee reveals no significant outward deformity. Right knee is slightly larger than the left knee but no significant knee effusion is appreciated. There is no erythema, warmth, skin lesions. The midline incision anteriorly right knee is well-healed without signs of complication. The patient ambulates without significant antalgia. Range of motion of the right knee 0-115. Left knee 0-125. Patient has negative hip logroll and Stinchfield test on the right. No instability of the right knee is appreciated. Calves are supple. Motor, sensory, vascular examination of the right lower extremity is grossly intact without focal deficits. The patient has full painless range of motion of the right ankle. Neuro: alert and oriented to person and place. Eyes: Extra-ocular muscles intact  Mouth: Oral mucosa moist. No perioral lesions  Pulm: Respirations unlabored and regular. Symmetric chest excursion without outward deformity is noted. Skin: warm, well perfused  Psych:   Patient has good fund of knowledge and displays understanging of exam, diagnosis, and plan. Radiology:     No results found. Assessment:      1. Mechanical loosening of internal right knee prosthetic joint, subsequent encounter    2. History of bilateral knee replacement       Plan:       Went over radiographs with patient. Discussed options with her going forward like  did. Discussed with her that if her right knee is better than it was in May. The patient notes she is always had some discomfort since surgery.   She functionally feels like she is doing well and does not feel unstable at all. I believe that there is no gross signs of loosening on x-ray. She could consider revision surgery secondary to her discomfort but recently has noted that the discomfort is improving. I would recommend at this point close monitoring with serial x-rays every 6 months for a while to make sure that no other issues with her knee arise. At this point I would not be in a hurry to do a revision knee replacement and the patient notes understanding and agrees. It appears that the knee is functionally fine at this point and stable. Patient to get her metal allergy test done. Follow up with  as scheduled. Follow up with me prn. I am happy to see the patient at any time to help as needed. Follow up:Return if symptoms worsen or fail to improve. No orders of the defined types were placed in this encounter. No orders of the defined types were placed in this encounter. Julien Orozco RN am scribing for and in the presence of Dr. Merline Silverman  6/29/2020 3:11 PM      I have reviewed and made changes accordingly to the work scribed by Milan Hall RN. The documentation accurately reflects work and decisions made by me. I have also reviewed documentation completed by clinical staff.     Merline Silverman DO, [facility]  6/29/2020 3:12 PM    This note is created with the assistance of a speech recognition program.  While intending to generate a document that actually reflects the content of the visit, the document can still have some errors including those of syntax and sound a like substitutions which may escape proof reading.  In such instances, actual meaning can be extrapolated by contextual diversion      Electronically signed by Kenia Mtz on 6/29/2020 at 3:11 PM

## 2020-07-06 ENCOUNTER — TELEPHONE (OUTPATIENT)
Dept: ORTHOPEDIC SURGERY | Age: 55
End: 2020-07-06

## 2020-07-09 ENCOUNTER — HOSPITAL ENCOUNTER (OUTPATIENT)
Age: 55
Discharge: HOME | End: 2020-07-09
Payer: COMMERCIAL

## 2020-07-09 PROCEDURE — 36415 COLL VENOUS BLD VENIPUNCTURE: CPT

## 2020-07-09 PROCEDURE — 82495 ASSAY OF CHROMIUM: CPT

## 2020-07-09 PROCEDURE — 83018 HEAVY METAL QUAN EACH NES: CPT

## 2020-07-12 LAB
CHROMIUM, SERUM: 1.4 UG/L
MISCELLANEOUS LAB TEST RESULT: NORMAL
TEST NAME: NORMAL

## 2020-07-21 ENCOUNTER — TELEPHONE (OUTPATIENT)
Dept: ORTHOPEDIC SURGERY | Age: 55
End: 2020-07-21

## 2020-07-28 ENCOUNTER — TELEPHONE (OUTPATIENT)
Dept: ORTHOPEDIC SURGERY | Age: 55
End: 2020-07-28

## 2020-08-31 ENCOUNTER — TELEPHONE (OUTPATIENT)
Dept: ORTHOPEDIC SURGERY | Age: 55
End: 2020-08-31

## 2020-08-31 NOTE — TELEPHONE ENCOUNTER
Maryann called and left a msg--She and her knee are doing fairly well at this time and does not want to proceed in scheduling any surgery. I will have her follow up in April with either CHRISTIE or Nikhil Figueroa depending on her issue. If she is having problems and wish to address again the treatment options of surgery we can schedule her as she desires or with Nikhil Figueroa in April (which is about 7 months) as her annual exam.  And PRN.

## 2020-09-10 ENCOUNTER — EMPLOYEE WELLNESS (OUTPATIENT)
Dept: OTHER | Age: 55
End: 2020-09-10

## 2020-09-10 ENCOUNTER — HOSPITAL ENCOUNTER (OUTPATIENT)
Age: 55
Discharge: HOME | End: 2020-09-10
Payer: COMMERCIAL

## 2020-09-10 LAB
CHOLESTEROL/HDL RATIO: 3.5
CHOLESTEROL: 186 MG/DL
GLUCOSE BLD-MCNC: 94 MG/DL (ref 70–99)
HDLC SERPL-MCNC: 53 MG/DL
LDL CHOLESTEROL: 119 MG/DL (ref 0–130)
PATIENT FASTING?: YES
TRIGL SERPL-MCNC: 71 MG/DL
VLDLC SERPL CALC-MCNC: NORMAL MG/DL (ref 1–30)

## 2020-09-10 PROCEDURE — 86353 LYMPHOCYTE TRANSFORMATION: CPT

## 2020-09-10 PROCEDURE — 36415 COLL VENOUS BLD VENIPUNCTURE: CPT

## 2020-09-28 LAB
SEND OUT REPORT: NORMAL
TEST NAME: NORMAL

## 2020-10-12 ENCOUNTER — TELEPHONE (OUTPATIENT)
Dept: ORTHOPEDIC SURGERY | Age: 55
End: 2020-10-12

## 2020-10-19 VITALS — WEIGHT: 145 LBS | BODY MASS INDEX: 24.13 KG/M2

## 2020-11-11 ENCOUNTER — OFFICE VISIT (OUTPATIENT)
Dept: OBGYN CLINIC | Age: 55
End: 2020-11-11
Payer: COMMERCIAL

## 2020-11-11 VITALS
WEIGHT: 145.6 LBS | HEIGHT: 65 IN | SYSTOLIC BLOOD PRESSURE: 124 MMHG | DIASTOLIC BLOOD PRESSURE: 79 MMHG | HEART RATE: 53 BPM | BODY MASS INDEX: 24.26 KG/M2

## 2020-11-11 PROBLEM — N63.10 BREAST MASS, RIGHT: Status: ACTIVE | Noted: 2020-11-11

## 2020-11-11 PROBLEM — R92.8 MAMMOGRAM ABNORMAL: Status: RESOLVED | Noted: 2017-08-02 | Resolved: 2020-11-11

## 2020-11-11 PROCEDURE — 99396 PREV VISIT EST AGE 40-64: CPT | Performed by: ADVANCED PRACTICE MIDWIFE

## 2020-11-11 SDOH — ECONOMIC STABILITY: FOOD INSECURITY: WITHIN THE PAST 12 MONTHS, YOU WORRIED THAT YOUR FOOD WOULD RUN OUT BEFORE YOU GOT MONEY TO BUY MORE.: NEVER TRUE

## 2020-11-11 SDOH — ECONOMIC STABILITY: FOOD INSECURITY: WITHIN THE PAST 12 MONTHS, THE FOOD YOU BOUGHT JUST DIDN'T LAST AND YOU DIDN'T HAVE MONEY TO GET MORE.: NEVER TRUE

## 2020-11-11 SDOH — SOCIAL STABILITY: SOCIAL INSECURITY
WITHIN THE LAST YEAR, HAVE YOU BEEN KICKED, HIT, SLAPPED, OR OTHERWISE PHYSICALLY HURT BY YOUR PARTNER OR EX-PARTNER?: NO

## 2020-11-11 SDOH — ECONOMIC STABILITY: TRANSPORTATION INSECURITY
IN THE PAST 12 MONTHS, HAS LACK OF TRANSPORTATION KEPT YOU FROM MEETINGS, WORK, OR FROM GETTING THINGS NEEDED FOR DAILY LIVING?: NO

## 2020-11-11 SDOH — ECONOMIC STABILITY: INCOME INSECURITY: HOW HARD IS IT FOR YOU TO PAY FOR THE VERY BASICS LIKE FOOD, HOUSING, MEDICAL CARE, AND HEATING?: NOT HARD AT ALL

## 2020-11-11 SDOH — SOCIAL STABILITY: SOCIAL INSECURITY: WITHIN THE LAST YEAR, HAVE YOU BEEN HUMILIATED OR EMOTIONALLY ABUSED IN OTHER WAYS BY YOUR PARTNER OR EX-PARTNER?: NO

## 2020-11-11 SDOH — SOCIAL STABILITY: SOCIAL INSECURITY: WITHIN THE LAST YEAR, HAVE YOU BEEN AFRAID OF YOUR PARTNER OR EX-PARTNER?: NO

## 2020-11-11 SDOH — SOCIAL STABILITY: SOCIAL INSECURITY
WITHIN THE LAST YEAR, HAVE TO BEEN RAPED OR FORCED TO HAVE ANY KIND OF SEXUAL ACTIVITY BY YOUR PARTNER OR EX-PARTNER?: NO

## 2020-11-11 SDOH — ECONOMIC STABILITY: TRANSPORTATION INSECURITY
IN THE PAST 12 MONTHS, HAS THE LACK OF TRANSPORTATION KEPT YOU FROM MEDICAL APPOINTMENTS OR FROM GETTING MEDICATIONS?: NO

## 2020-11-11 ASSESSMENT — PATIENT HEALTH QUESTIONNAIRE - PHQ9
2. FEELING DOWN, DEPRESSED OR HOPELESS: 0
SUM OF ALL RESPONSES TO PHQ QUESTIONS 1-9: 0
SUM OF ALL RESPONSES TO PHQ9 QUESTIONS 1 & 2: 0
SUM OF ALL RESPONSES TO PHQ QUESTIONS 1-9: 0
SUM OF ALL RESPONSES TO PHQ QUESTIONS 1-9: 0
1. LITTLE INTEREST OR PLEASURE IN DOING THINGS: 0

## 2020-11-11 ASSESSMENT — ENCOUNTER SYMPTOMS
RESPIRATORY NEGATIVE: 1
GASTROINTESTINAL NEGATIVE: 1

## 2020-11-11 NOTE — PROGRESS NOTES
non-icteric  Lips, teeth and gums without lesions and normal dentition  Nares are patent without discharge  Normal external ears are present with no hearing loss  The neck was supple with full range of motion and no masses. The thyroid was not enlarged and had no masses. No enlarged cervical lymph nodes  Lymphatic:   No lymph nodes were palpable in neck, axilla or groin   Neurologic:    Normal speech, no focal findings or movement disorder noted  Respiratory: The lungs were clear to auscultation bilaterally. There were no rales, rhonchi or wheezes. There was good respiratory effort. Cardiovascular: The heart was in a regular rhythm and rate was normal.  No murmur or extra sounds were noted. Breast:  The breasts are normal size and symmetrical.  There are no skin changes with position changes. The nipples are without deviations or discharge. No masses were palpated on the left breast but was tender to palpation. On the right breast, at the 10 o'clock position is a flocculent, well-circumscribed mass, approximately 3-4 cm in diameter, easily moveable, slightly tender. No axillary or supraclavicular lymphadenopathy is present. Back:  Straight with no CVA tenderness present  Abdomen: The abdomen is soft and non-tender. There was no guarding, rebound or rigidity. The bladder was without fullness or tenderness. No hernias were appreciated. Pelvic:  Deferred  Musculoskeletal:   Normal gait. No contractions with normal movement of all extremities. Range of motion appropriate for patient's age. Extremities:  No cyanosis or edema present. No calf tenderness  Neurologic:    Normal speech, no focal findings or movement disorder noted  Psychiatric:  Alert, oriented to time, place, person and situation. There are no mood or affect changes. ASSESSMENT/PLAN:  1.  Encounter for well woman exam with routine gynecological exam  AGE>40 Counseling and Evaluation  Sexuality/Reproductive Planning  [] Discussed birth control as needed and reviewed ACHES; refills given   [] Reproductive plan discussed (as appropriate)  [x] Sexual function: No issues  [] Discussed STI counseling/prevention  Fitness/Nutrition  [x] Physical activity  [] Folic Acid supplementation discussed  [] Calcium (split dose) supplementation  Health/Risk Assessment  [x] Discussed annual Well-Woman exams every year; Pap per ASCCP guidelines and testing  [x] Discussed mammogram screening (ages 39/51) per current recommendations (if no abnormalities or family history; breast self-awareness reinforced  [] Discussed colonoscopy screening (age 48)  [] Discussed DEXA screening at age 72 )if no fracture history or Osteoporosis family history  [x] Reinforced Calcium (split dose)/Vitamin D supplementation  [x] Hereditary Breast/Ovarian Cancer screening: Done  [x] Hepatitis C screening: Discussed and will discuss with PCP  [] Immunizations:  [] Personal history of Pre-Eclampsia or GDM  [x] Tobacco & Secondary smoke risks: Not a smoker  [x] Health maintenance through PCP  Psychosocial Evaluation  [x] Intimate partner violence screening: Negative  [x] Depression/Anxiety screening: Negative  [x] Lifestyle/stress: Work/Covid  Cardiovascular Risk Factors  [] Family history  [] Hypertension  [] Dyslipidemia  [] Obesity  [] Diabetes Mellitus  [] Personal hx of PreE, GDM, or PIH  [] Lifestyle    2. Breast mass, right  - Discussed previously drained and returns, await results to discuss further plan of care  - [facility]AMES DIGITAL DIAGNOSTIC W OR WO CAD BILATERAL; Future  - US BREAST COMPLETE RIGHT; Future    3. Encounter for screening mammogram for breast cancer      Patient was seen with total face to face time of 20 minutes. More than 50% of this visit was on counseling and education regardingher diagnoses and her options. She was also counseled on her preventative health maintenance recommendations and follow-up.     Electronically Signed by DENISE Ackerman CNM

## 2020-11-16 ENCOUNTER — HOSPITAL ENCOUNTER (OUTPATIENT)
Age: 55
Discharge: HOME | End: 2020-11-16
Payer: COMMERCIAL

## 2020-11-16 ENCOUNTER — HOSPITAL ENCOUNTER (OUTPATIENT)
Dept: GENERAL RADIOLOGY | Age: 55
Discharge: HOME | End: 2020-11-18
Payer: COMMERCIAL

## 2020-11-16 ENCOUNTER — HOSPITAL ENCOUNTER (OUTPATIENT)
Age: 55
Discharge: HOME | End: 2020-11-18
Payer: COMMERCIAL

## 2020-11-16 LAB
-: NORMAL
ABSOLUTE EOS #: 0.45 K/UL (ref 0–0.44)
ABSOLUTE IMMATURE GRANULOCYTE: <0.03 K/UL (ref 0–0.3)
ABSOLUTE LYMPH #: 1.65 K/UL (ref 1.1–3.7)
ABSOLUTE MONO #: 0.35 K/UL (ref 0.1–1.2)
ALBUMIN SERPL-MCNC: 3.9 G/DL (ref 3.5–5.2)
ALBUMIN/GLOBULIN RATIO: 2.2 (ref 1–2.5)
ALP BLD-CCNC: 95 U/L (ref 35–104)
ALT SERPL-CCNC: 16 U/L (ref 5–33)
AMORPHOUS: NORMAL
ANION GAP SERPL CALCULATED.3IONS-SCNC: 11 MMOL/L (ref 9–17)
AST SERPL-CCNC: 21 U/L
BACTERIA: NORMAL
BASOPHILS # BLD: 1 % (ref 0–2)
BASOPHILS ABSOLUTE: 0.03 K/UL (ref 0–0.2)
BILIRUB SERPL-MCNC: 0.28 MG/DL (ref 0.3–1.2)
BILIRUBIN URINE: NEGATIVE
BUN BLDV-MCNC: 21 MG/DL (ref 6–20)
BUN/CREAT BLD: ABNORMAL (ref 9–20)
CALCIUM SERPL-MCNC: 9.4 MG/DL (ref 8.6–10.4)
CASTS UA: NORMAL /LPF (ref 0–8)
CHLORIDE BLD-SCNC: 106 MMOL/L (ref 98–107)
CHOLESTEROL/HDL RATIO: 3.5
CHOLESTEROL: 166 MG/DL
CO2: 21 MMOL/L (ref 20–31)
COLOR: YELLOW
COMMENT UA: ABNORMAL
CREAT SERPL-MCNC: 0.78 MG/DL (ref 0.5–0.9)
CRYSTALS, UA: NORMAL /HPF
DIFFERENTIAL TYPE: ABNORMAL
EOSINOPHILS RELATIVE PERCENT: 9 % (ref 1–4)
EPITHELIAL CELLS UA: NORMAL /HPF (ref 0–5)
GFR AFRICAN AMERICAN: >60 ML/MIN
GFR NON-AFRICAN AMERICAN: >60 ML/MIN
GFR SERPL CREATININE-BSD FRML MDRD: ABNORMAL ML/MIN/{1.73_M2}
GFR SERPL CREATININE-BSD FRML MDRD: ABNORMAL ML/MIN/{1.73_M2}
GLUCOSE BLD-MCNC: 99 MG/DL (ref 70–99)
GLUCOSE URINE: NEGATIVE
HCT VFR BLD CALC: 42.6 % (ref 36.3–47.1)
HDLC SERPL-MCNC: 47 MG/DL
HEMOGLOBIN: 13.9 G/DL (ref 11.9–15.1)
IMMATURE GRANULOCYTES: 0 %
KETONES, URINE: NEGATIVE
LDL CHOLESTEROL: 107 MG/DL (ref 0–130)
LEUKOCYTE ESTERASE, URINE: ABNORMAL
LYMPHOCYTES # BLD: 34 % (ref 24–43)
MCH RBC QN AUTO: 27.8 PG (ref 25.2–33.5)
MCHC RBC AUTO-ENTMCNC: 32.6 G/DL (ref 28.4–34.8)
MCV RBC AUTO: 85.2 FL (ref 82.6–102.9)
MONOCYTES # BLD: 7 % (ref 3–12)
MUCUS: NORMAL
NITRITE, URINE: NEGATIVE
NRBC AUTOMATED: 0 PER 100 WBC
OTHER OBSERVATIONS UA: NORMAL
PDW BLD-RTO: 12.9 % (ref 11.8–14.4)
PH UA: 5.5 (ref 5–8)
PLATELET # BLD: 289 K/UL (ref 138–453)
PLATELET ESTIMATE: ABNORMAL
PMV BLD AUTO: 10.4 FL (ref 8.1–13.5)
POTASSIUM SERPL-SCNC: 4.1 MMOL/L (ref 3.7–5.3)
PROTEIN UA: NEGATIVE
RBC # BLD: 5 M/UL (ref 3.95–5.11)
RBC # BLD: ABNORMAL 10*6/UL
RBC UA: NORMAL /HPF (ref 0–4)
RENAL EPITHELIAL, UA: NORMAL /HPF
SEG NEUTROPHILS: 49 % (ref 36–65)
SEGMENTED NEUTROPHILS ABSOLUTE COUNT: 2.4 K/UL (ref 1.5–8.1)
SODIUM BLD-SCNC: 138 MMOL/L (ref 135–144)
SPECIFIC GRAVITY UA: 1.01 (ref 1–1.03)
TOTAL PROTEIN: 5.7 G/DL (ref 6.4–8.3)
TRICHOMONAS: NORMAL
TRIGL SERPL-MCNC: 61 MG/DL
TURBIDITY: CLEAR
URINE HGB: NEGATIVE
UROBILINOGEN, URINE: NORMAL
VLDLC SERPL CALC-MCNC: NORMAL MG/DL (ref 1–30)
WBC # BLD: 4.9 K/UL (ref 3.5–11.3)
WBC # BLD: ABNORMAL 10*3/UL
WBC UA: NORMAL /HPF (ref 0–5)
YEAST: NORMAL

## 2020-11-16 PROCEDURE — 36415 COLL VENOUS BLD VENIPUNCTURE: CPT

## 2020-11-16 PROCEDURE — 81001 URINALYSIS AUTO W/SCOPE: CPT

## 2020-11-16 PROCEDURE — 85025 COMPLETE CBC W/AUTO DIFF WBC: CPT

## 2020-11-16 PROCEDURE — 80061 LIPID PANEL: CPT

## 2020-11-16 PROCEDURE — 72100 X-RAY EXAM L-S SPINE 2/3 VWS: CPT

## 2020-11-16 PROCEDURE — 80053 COMPREHEN METABOLIC PANEL: CPT

## 2020-12-29 ENCOUNTER — HOSPITAL ENCOUNTER (OUTPATIENT)
Dept: MAMMOGRAPHY | Age: 55
Discharge: HOME | End: 2020-12-31
Payer: COMMERCIAL

## 2020-12-29 ENCOUNTER — HOSPITAL ENCOUNTER (OUTPATIENT)
Dept: ULTRASOUND IMAGING | Age: 55
Discharge: HOME | End: 2020-12-31
Payer: COMMERCIAL

## 2020-12-29 DIAGNOSIS — N63.10 BREAST MASS, RIGHT: ICD-10-CM

## 2020-12-29 PROCEDURE — 76642 ULTRASOUND BREAST LIMITED: CPT

## 2020-12-29 PROCEDURE — G0279 TOMOSYNTHESIS, MAMMO: HCPCS

## 2021-03-10 DIAGNOSIS — Z96.653 HISTORY OF BILATERAL KNEE REPLACEMENT: Primary | ICD-10-CM

## 2021-03-11 ENCOUNTER — OFFICE VISIT (OUTPATIENT)
Dept: ORTHOPEDIC SURGERY | Age: 56
End: 2021-03-11
Payer: COMMERCIAL

## 2021-03-11 VITALS
HEIGHT: 65 IN | SYSTOLIC BLOOD PRESSURE: 126 MMHG | HEART RATE: 69 BPM | DIASTOLIC BLOOD PRESSURE: 75 MMHG | WEIGHT: 140 LBS | BODY MASS INDEX: 23.32 KG/M2

## 2021-03-11 DIAGNOSIS — M71.22 BAKERS CYST, LEFT: ICD-10-CM

## 2021-03-11 DIAGNOSIS — T84.033D MECHANICAL LOOSENING OF INTERNAL LEFT KNEE PROSTHETIC JOINT, SUBSEQUENT ENCOUNTER: ICD-10-CM

## 2021-03-11 DIAGNOSIS — T84.032D MECHANICAL LOOSENING OF INTERNAL RIGHT KNEE PROSTHETIC JOINT, SUBSEQUENT ENCOUNTER: Primary | ICD-10-CM

## 2021-03-11 DIAGNOSIS — M71.21 BAKER'S CYST OF KNEE, RIGHT: ICD-10-CM

## 2021-03-11 DIAGNOSIS — Z96.653 HISTORY OF BILATERAL KNEE REPLACEMENT: ICD-10-CM

## 2021-03-11 PROCEDURE — 99213 OFFICE O/P EST LOW 20 MIN: CPT | Performed by: ORTHOPAEDIC SURGERY

## 2021-03-11 ASSESSMENT — ENCOUNTER SYMPTOMS
VOMITING: 0
COUGH: 0
APNEA: 0
ABDOMINAL DISTENTION: 0
CONSTIPATION: 0
ABDOMINAL PAIN: 0
CHEST TIGHTNESS: 0
SHORTNESS OF BREATH: 0
COLOR CHANGE: 0
DIARRHEA: 0
NAUSEA: 0

## 2021-03-11 NOTE — PROGRESS NOTES
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 The pain has been present for 1 year and 4 months. The patient recalls a specific injury where the knee got hit off the ground on 11/05/2019 when she was walking her dog. Patient states that she was out walking her dog that day and the dog began to take off and he pulled her to the ground causing her to hit the knee off the ground. Since the injury, the patient has tried rest, a compression sleeve and elevation with no improvement. The pain is now described as nonexistent. There is no pain on weight bearing. The knee has swelled. There is no painful popping and clicking. The knee has not caught or locked up. The knee has not given out. It is not stiff upon arising from sitting. It is not painful to go up and down stairs and sit for a prolonged time. The patient has not had a cortisone injection. The patient has not tried a lubrication injection. The patient has tried physical therapy after her surgeries on both knees. The patient has had surgery and it was a a bilateral TKA that was done on 09/21/2016. Therefore we are 4 years and 6 months post op. Patient states that neither one of her knees are in pain at this time and she no longer has any issues with them. ROS:   Review of Systems   Constitutional: Positive for activity change. Negative for appetite change, fatigue and fever. Respiratory: Negative for apnea, cough, chest tightness and shortness of breath. Cardiovascular: Negative for chest pain, palpitations and leg swelling.    Gastrointestinal: Negative for abdominal distention, abdominal pain, constipation, diarrhea, nausea and vomiting. Genitourinary: Negative for difficulty urinating, dysuria and hematuria. Musculoskeletal: Positive for arthralgias. Negative for gait problem, joint swelling and myalgias. Skin: Negative for color change and rash. Neurological: Negative for dizziness, weakness, numbness and headaches. Psychiatric/Behavioral: Negative for sleep disturbance. Past Medical History:    Past Medical History:   Diagnosis Date    Abnormal Pap smear, low grade squamous intraepithelial lesion (LGSIL) 2004    Chronic headaches     Duodenal ulcer     history of ulcer over 1 yr ago (8/2016)    History of renal stone     passed    Melanoma of skin, site unspecified 2008    Malignant melanoma    MTHFR mutation ([facility] [address]     HETERO    Osteoarthritis of both knees     Pyloric stenosis     repaired as infant    Scoliosis      Past Surgical History:    Past Surgical History:   Procedure Laterality Date    EYE SURGERY      lazy eye    HYSTEROSCOPY  2008    KNEE ARTHROPLASTY Bilateral 09/21/2016    SKIN BIOPSY      SKIN CANCER EXCISION  2008     Current Medications:   Current Outpatient Medications   Medication Sig Dispense Refill    meloxicam (MOBIC) 15 MG tablet Take 15 mg by mouth daily      Levocetirizine Dihydrochloride (XYZAL PO) Take by mouth      folic acid-pyridoxine-cyanocobalamine (FOLTX) 2.5-25-1 MG TABS tablet Take 1 tablet by mouth daily 30 tablet 12    ibuprofen (ADVIL) 200 MG tablet Take 200 mg by mouth every 6 hours as needed for Pain      acetaminophen (TYLENOL) 325 MG tablet Take 650 mg by mouth every 6 hours as needed for Pain      NONFORMULARY Iron over the counter 3 times daily      Meloxicam (MOBIC PO) Take 15 mg by mouth daily        No current facility-administered medications for this visit. Allergies:    Patient has no known allergies.     Social History:   Social History     Socioeconomic History  Marital status:      Spouse name: Not on file    Number of children: Not on file    Years of education: Not on file    Highest education level: Not on file   Occupational History    Not on file   Social Needs    Financial resource strain: Not hard at all    Food insecurity     Worry: Never true     Inability: Never true   [employer] needs     Medical: No     Non-medical: No   Tobacco Use    Smoking status: Never Smoker    Smokeless tobacco: Never Used   Substance and Sexual Activity    Alcohol use: Yes     Comment: social    Drug use: No    Sexual activity: Yes     Partners: Male   Lifestyle    Physical activity     Days per week: Not on file     Minutes per session: Not on file    Stress: Not on file   Relationships    Social connections     Talks on phone: Not on file     Gets together: Not on file     Attends Temple service: Not on file     Active member of club or organization: Not on file     Attends meetings of clubs or organizations: Not on file     Relationship status: Not on file    Intimate partner violence     Fear of current or ex partner: No     Emotionally abused: No     Physically abused: No     Forced sexual activity: No   Other Topics Concern    Not on file   Social History Narrative    ** Merged History Encounter **          Family History:  Family History   Problem Relation Age of Onset    Cancer Father         COPD, smoker,    Elfreda Hampton Diabetes Father     Hypertension Father     Brain Cancer Mother         brain tumor,    Elfreda Hampton Thyroid Disease Mother     Diabetes Paternal Grandfather     Diabetes Paternal Grandmother     Osteoporosis Maternal Grandmother     Breast Cancer Maternal Grandmother     Stroke Maternal Grandfather     Arrhythmia Brother      Vitals:   /75   Pulse 69   Ht 5' 5\" (1.651 m)   Wt 140 lb (63.5 kg)   BMI 23.30 kg/m²  Body mass index is 23.3 kg/m².   Physical Examination:     Orthopedics:    GENERAL: Alert and oriented X3 in no acute distress. SKIN: Intact without lesions or ulcerations. NEURO: Intact to sensory and motor testing. VASC: Capillary refill is less than 3 seconds. KNEE EXAM    LOCATION: Bilateral Knee  GEN: Alert and oriented X 3, in no acute distress. GAIT: The patient's gait was observed while entering the exam room and was noted to be non antalgic. The extremity is in anatomic alignment. SKIN: Intact without rashes, lesions, or ulcerations. No obvious deformity or swelling. NEURO: The patient responds to light touch throughout bilateral LE. Patellar and Achilles reflexes are 2/4. VASC: The bilateral LE is neurovascularly intact with 2/4 DP and 2/4 PT pulses. Brisk capillary refill. ROM: Right: 0/120 degrees. There is no effusion. Left: 0/118 degrees. There is no effusion. MUSC: good quad tone  LIGAMENT: There is No varus instability at 0 degrees and No varus instability at 30 degrees. There is No valgus instability at 0 degrees and No valgus instability at 30 degrees. PALP: There is no joint line pain. Small baker's cyst noted posteriorly in the bilateral knee. Assessment:     1. Mechanical loosening of internal right knee prosthetic joint, subsequent encounter    2. History of bilateral knee replacement    3. Baker's cyst of knee, right    4. Bakers cyst, left    5. Mechanical loosening of internal left knee prosthetic joint, subsequent encounter      Procedures:    Procedure: no  Radiology:   KNEE - TKA X-RAY    4 views of the bilateral knee including AP, bilateral tunnel, and lateral in the upright position, and skyline views reveal anatomic alignment with no fracture or dislocation. There is a Depuy bilateral, right total knee replacement in good alignment. The implants are well seated with signs of cement mantle loosening. There are signs of osteolysis or wear. There is no acute bony abnormality, periosteal reaction or soft tissue masses present.  Radiographs were compared from 04/20/20 and 11/18/19. Impression: Stable depuy total knee replacement with known loosening of the bilateral knee. Plan:   Treatment : I reviewed the X-ray with the patient and I informed her that the prosthetics are well seated with no signs of loosening. We discussed the etiologies and natural histories of s/p bilateral TKA. We discussed the various treatment alternatives including anti-inflammatory medications, physical therapy, injections, further imaging studies and as a last result surgery. During today's visit, I explained to the patient that I am happy that she is not in pain anymore. I then instructed her to continue doing what she is doing that is keeping her pain reduced. However, if she has any issues that arise with her knees, I instructed her to call the office and we will evaluate her knees. The patient then stated that she understands the plan and at this time, the patient has opted to continue to do the stretches and exercises that she was instructed to do on her own to maintain her strength and range of motion. If the patient ever feels they are unable to do the stretches or exercises on their own or they feel they are losing strength and range of motion, they may contact our office if they would like to go back to physical therapy. Patient should return to the clinic in 6 months to follow up with Frida ALBERTO for her annual follow up and at that visit we will get PC XR of the bilateral knee. The patient will call the office immediately with any problems. No orders of the defined types were placed in this encounter. No orders of the defined types were placed in this encounter. Levy Lopez V, am scribing for and in the presence of Frida Durán D.O.. 3/14/2021  4:12 PM      I, Frida Durán DO, have personally seen this patient and I have reviewed the CC, PMH, FHX and Social History as provided by other clinical staff.  I reassessed the HPI and ROS as scribed by Derik Gomez in my presence and it is both accurate and complete. Thereafter, I personally performed the PE, reviewed the imaging and established the DX and POC. I agree with the documentation provided by the Medical Scribe. I have reviewed all documentation in its entirety prior to providing my signature indicating agreement. Any areas of disagreement are noted on the chart.     Electronically signed by Master Greene DO on 3/14/2021 at 4:13 PM        Electronically signed by Master Greene DO, on 3/14/2021 at 4:12 PM

## 2021-05-28 ENCOUNTER — TELEPHONE (OUTPATIENT)
Dept: ORTHOPEDIC SURGERY | Age: 56
End: 2021-05-28

## 2021-05-28 RX ORDER — CEPHALEXIN 500 MG/1
500 CAPSULE ORAL 4 TIMES DAILY
Qty: 4 CAPSULE | Refills: 0 | Status: SHIPPED | OUTPATIENT
Start: 2021-05-28

## 2021-05-28 NOTE — TELEPHONE ENCOUNTER
Patient calling to request an order for an antibiotic for an upcoming dentist appointment. Please send order to [id number] [email or web address].

## 2021-07-02 ENCOUNTER — OFFICE VISIT (OUTPATIENT)
Dept: ORTHOPEDIC SURGERY | Age: 56
End: 2021-07-02
Payer: COMMERCIAL

## 2021-07-02 VITALS
HEIGHT: 65 IN | TEMPERATURE: 98.2 F | HEART RATE: 76 BPM | SYSTOLIC BLOOD PRESSURE: 142 MMHG | DIASTOLIC BLOOD PRESSURE: 76 MMHG | RESPIRATION RATE: 12 BRPM | BODY MASS INDEX: 24.16 KG/M2 | WEIGHT: 145 LBS

## 2021-07-02 DIAGNOSIS — S46.011A TRAUMATIC COMPLETE TEAR OF RIGHT ROTATOR CUFF, INITIAL ENCOUNTER: Primary | ICD-10-CM

## 2021-07-02 DIAGNOSIS — M25.511 RIGHT SHOULDER PAIN, UNSPECIFIED CHRONICITY: Primary | ICD-10-CM

## 2021-07-02 PROCEDURE — 99213 OFFICE O/P EST LOW 20 MIN: CPT | Performed by: PHYSICIAN ASSISTANT

## 2021-07-02 ASSESSMENT — ENCOUNTER SYMPTOMS
ABDOMINAL DISTENTION: 0
VOMITING: 0
DIARRHEA: 0
COLOR CHANGE: 0
NAUSEA: 0
CONSTIPATION: 0
ABDOMINAL PAIN: 0
APNEA: 0
RESPIRATORY NEGATIVE: 1
SHORTNESS OF BREATH: 0
COUGH: 0
CHEST TIGHTNESS: 0

## 2021-07-02 NOTE — PROGRESS NOTES
[facility]  [address]  [address] [facility] [address]  Dept: [phone]  Dept Fax: [phone]        Right Shoulder - Follow Up     Chief Complaint:     Chief Complaint   Patient presents with    Shoulder Pain     R Shoulder Pain x 2Y, Fall Yesterday     HPI:     Xiomara Garcia is a 64y.o. year old right hand dominant female that has had pain in the right shoulder for 1 day, but she has had on and off pain for a couple of years. She states it is a little better this morning. 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 She had a fall yesterday and landed on an outstretched arm. She states she could not raise her arm last night. The pain is worse at night and when doing overhead activities. Weakness of the shoulder has been noted. The pain restricts activities such as reaching and lifting, working, washing her hair. The pain does not seem to improve with time. The following medications have been tried mobic. The patient has had a corticosteroid injection years ago at the [facility]. The patient has not tried physical therapy. The patient has not has surgery. The opposite shoulder is okay. Neck pain has not been present. She states she also gets pain in the anterior shoulder over bicep tendon. Review of Systems   Constitutional: Positive for activity change. Negative for appetite change, fatigue and fever. Respiratory: Negative. Negative for apnea, cough, chest tightness and shortness of breath. Cardiovascular: Negative. Negative for chest pain, palpitations and leg swelling. Gastrointestinal: Negative for abdominal distention, abdominal pain, constipation, diarrhea, nausea and vomiting. Genitourinary: Negative for difficulty urinating, dysuria and hematuria. Musculoskeletal: Positive for arthralgias. Negative for gait problem, joint swelling and myalgias. Skin: Negative for color change and rash. Neurological: Negative for dizziness, weakness, numbness and headaches. Psychiatric/Behavioral: Positive for sleep disturbance. Past Medical History:    Past Medical History:   Diagnosis Date    Abnormal Pap smear, low grade squamous intraepithelial lesion (LGSIL) 2004    Chronic headaches     Duodenal ulcer     history of ulcer over 1 yr ago (8/2016)    History of renal stone     passed    Melanoma of skin, site unspecified 2008    Malignant melanoma    MTHFR mutation     HETERO    Osteoarthritis of both knees     Pyloric stenosis     repaired as infant    Scoliosis        Past Surgical History:    Past Surgical History:   Procedure Laterality Date    EYE SURGERY      lazy eye    HYSTEROSCOPY  2008    KNEE ARTHROPLASTY Bilateral 09/21/2016    SKIN BIOPSY      SKIN CANCER EXCISION  2008       CurrentMedications:   Current Outpatient Medications   Medication Sig Dispense Refill    cephALEXin (KEFLEX) 500 MG capsule Take 1 capsule by mouth 4 times daily 4 capsules by mouth 1hr before dental procedure 4 capsule 0    meloxicam (MOBIC) 15 MG tablet Take 15 mg by mouth daily      Levocetirizine Dihydrochloride (XYZAL PO) Take by mouth      folic acid-pyridoxine-cyanocobalamine (FOLTX) 2.5-25-1 MG TABS tablet Take 1 tablet by mouth daily 30 tablet 12    ibuprofen (ADVIL) 200 MG tablet Take 200 mg by mouth every 6 hours as needed for Pain      acetaminophen (TYLENOL) 325 MG tablet Take 650 mg by mouth every 6 hours as needed for Pain      NONFORMULARY Iron over the counter 3 times daily      Meloxicam (MOBIC PO) Take 15 mg by mouth daily        No current facility-administered medications for this visit. Allergies:    Patient has no known allergies.     Social History:   Social History     Socioeconomic History    Marital status:      Spouse name: None    Number of children: None    Years of education: None    Highest education level: None   Occupational History    None the documentation provided by other staff and have reviewed their documentation prior to providing my signature indicating agreement. Vitals:   BP (!) 142/76   Pulse 76   Temp 98.2 °F (36.8 °C)   Resp 12   Ht 5' 5\" (1.651 m)   Wt 145 lb (65.8 kg)   BMI 24.13 kg/m²  Body mass index is 24.13 kg/m². Physical Examination:     Orthopedics:    GENERAL: Alert and oriented X3 in no acute distress. SKIN: Intact without lesions or ulcerations. NEURO: Musculoskeletal and axillary nerves intact to sensory and motor testing. VASC: Capillary refill is less than 3 seconds. Right Shoulder Exam    GEN: Alert and oriented X 3, in no acute distress. SKIN: Intact without rashes, lesions, or ulcerations. NEURO: Musculoskeletal ans axillary nerves intact to sensory and motor testing. VASC: Cap refill less than than 3 secs. Negative Adson's test, Negative Kia's test.  ROM: 160 degrees of forward elevation, 60 degrees of external rotation in neutral, 90 degrees of external rotation in abduction, internal rotation to L1.    STRENGTH: Supraspinatus 4-/5, external rotators 4-/5. MUSC: No atrophy, negative subscap lift off or belly press test.  IMP: mild Neer's sign, mild Hawkin's sign, negative Coracoid impingement, no painful arc, + pain with cross body abduction. PALP: no pain over anterolateral acromion, no pain over AC joint, no pain over traps/rhomboids. INST: + Summers's test, + Speed's test  Assessment:     1. Rotator cuff arthropathy of right shoulder      Procedures:    Procedure: no  Radiology:   SHOULDER X-RAY    Two views of the right shoulder and 2 views of the scapula, including AP, scapular Y, outlet and axillary views reveal: The glenohumeral joint is well reduced with mild arthritic changes. Proximal migration of the humeral head has occurred. Acromion is a type II. The acromioclavicular joint shows mild degenerative changes.       Impression: Rotator cuff arthropathy of the right shoulder   Plan: Treatment : I reviewed the X-ray with the patient and I informed them that the x-rays show some rotator cuff arthropathy that is probably been going on since her fall in 2013. We discussed the etiologies and natural histories of complete rotator cuff tear. We discussed the various treatment alternatives including anti-inflammatory medications, physical therapy, injections, further imaging studies and as a last result surgery. During today's visit, we discussed that I am not sure if she traumatically finished off her rotator cuff with her fall yesterday or if it has been going on for since her fall in 2013. The best way to tell that is with an MRI of her right shoulder. At this time, the patient has opted for getting an MRI of her right shoulder and doing home exercises. The patient is a physical therapist and I do not feel that physical therapy is going to make a difference in her case. Patient should return to the clinic after her MRI to follow up with Francisco Cruz D.O. . The patient will call the office immediately with any problems. No orders of the defined types were placed in this encounter. No orders of the defined types were placed in this encounter. Aldo Mack PA-C, have personally seen this patient, reviewed the chart including history, and imaging if done. I personally  performed the physical exam and obtained any needed additional history. I placed orders, performed or supervised procedures and developed the treatment plan.     Electronically signed by Ekta August PA-C, on 7/2/2021 at 11:53 AM      Electronically signed by Ekta August PA-C, on 7/2/2021 at 11:50 AM

## 2021-07-07 LAB
CHOLESTEROL/HDL RATIO: 3.4
CHOLESTEROL: 191 MG/DL
GLUCOSE BLD-MCNC: 99 MG/DL (ref 70–99)
HDLC SERPL-MCNC: 57 MG/DL
LDL CHOLESTEROL: 118 MG/DL (ref 0–130)
PATIENT FASTING?: YES
TRIGL SERPL-MCNC: 81 MG/DL
VLDLC SERPL CALC-MCNC: NORMAL MG/DL (ref 1–30)

## 2021-07-21 ENCOUNTER — HOSPITAL ENCOUNTER (OUTPATIENT)
Dept: MRI IMAGING | Facility: CLINIC | Age: 56
Discharge: HOME | End: 2021-07-23
Payer: COMMERCIAL

## 2021-07-21 DIAGNOSIS — S46.011A TRAUMATIC COMPLETE TEAR OF RIGHT ROTATOR CUFF, INITIAL ENCOUNTER: ICD-10-CM

## 2021-07-21 PROCEDURE — 73221 MRI JOINT UPR EXTREM W/O DYE: CPT

## 2021-08-23 ENCOUNTER — OFFICE VISIT (OUTPATIENT)
Dept: ORTHOPEDIC SURGERY | Age: 56
End: 2021-08-23
Payer: COMMERCIAL

## 2021-08-23 VITALS — HEIGHT: 65 IN | WEIGHT: 141 LBS | BODY MASS INDEX: 23.49 KG/M2

## 2021-08-23 DIAGNOSIS — M75.121 NONTRAUMATIC COMPLETE TEAR OF RIGHT ROTATOR CUFF: Primary | ICD-10-CM

## 2021-08-23 PROCEDURE — 99213 OFFICE O/P EST LOW 20 MIN: CPT | Performed by: ORTHOPAEDIC SURGERY

## 2021-08-23 NOTE — PROGRESS NOTES
Orthopedic Shoulder Encounter Note     Chief complaint: right shoulder pain    HPI: Deneen Vivas is a 64 y.o.  right-hand dominant female who presents for evaluation of her right shoulder. She states that she has been having pain for some years now but 7 weeks ago she fell on an outstretched right arm and had an increase in pain. It has gotten much better since then and at this time she denies having any pain in the shoulder. She is a physical therapist by occupation. She was seen after this injury by [facility] and an MRI was obtained and she was referred to my clinic for further evaluation and treatment. Previous treatment:    NSAIDs: Mobic, ibuprofen     Physical Therapy:  No    Injections: Right shoulder cortisone injection years ago at the [facility] clinic    Surgeries: None    Review of Systems:   Constitutional: Negative for fever, chills, sweats. Pain Score:   0 - No pain  Neurological: Negative for headache, numbness, or weakness. Musculoskeletal: As noted in HPI     Past [address]  has a past medical history of Abnormal Pap smear, low grade squamous intraepithelial lesion (LGSIL), Chronic headaches, Duodenal ulcer, History of renal stone, Melanoma of skin, site unspecified, MTHFR mutation, Osteoarthritis of both knees, Pyloric stenosis, and Scoliosis. Past Surgical History  Joy Mcgrath  has a past surgical history that includes hysteroscopy (2008); Skin cancer excision (2008); Eye surgery; skin biopsy; and Knee Arthroplasty (Bilateral, 09/21/2016).     Current Medications  Current Outpatient Medications   Medication Sig Dispense Refill    cephALEXin (KEFLEX) 500 MG capsule Take 1 capsule by mouth 4 times daily 4 capsules by mouth 1hr before dental procedure 4 capsule 0    meloxicam (MOBIC) 15 MG tablet Take 15 mg by mouth daily      Levocetirizine Dihydrochloride (XYZAL PO) Take by mouth      folic acid-pyridoxine-cyanocobalamine (FOLTX) 2.5-25-1 MG TABS tablet Take 1 tablet by mouth daily 30 tablet 12    ibuprofen (ADVIL) 200 MG tablet Take 200 mg by mouth every 6 hours as needed for Pain      acetaminophen (TYLENOL) 325 MG tablet Take 650 mg by mouth every 6 hours as needed for Pain      NONFORMULARY Iron over the counter 3 times daily      Meloxicam (MOBIC PO) Take 15 mg by mouth daily        No current facility-administered medications for this visit. Allergies  Allergies have been reviewed. Pawan Lin has No Known Allergies. Social History  Pawan Lin  reports that she has never smoked. She has never used smokeless tobacco. She reports current alcohol use. She reports that she does not use drugs. Family History  Margaret's family history includes Arrhythmia in her brother; Rilla Muff in her mother; Breast Cancer in her maternal grandmother; Cancer in her father; Diabetes in her father, paternal grandfather, and paternal grandmother; Hypertension in her father; Osteoporosis in her maternal grandmother; Stroke in her maternal grandfather; Thyroid Disease in her mother. Physical Exam:     Ht 5' 5\" (1.651 m)   Wt 141 lb (64 kg)   BMI 23.46 kg/m²    Constitutional: Patient is oriented to person, place, and time. Patient appears well-developed and well nourished. Mental Status/psychiatric: Behavior is normal. Thought content normal.  HENT: Negative otherwise noted  Head: Normocephalic and Atraumatic  Nose: Normal  Respiratory/Cardio: Effort normal. No respiratory distress. Gait: normal    Shoulder:    Skin: Skin is warm and dry; no swelling or obvious muscular atrophy.    Vasculature: 2+ radial pulses bilaterally  Neuro: Sensation grossly intact to light touch diffusely  Tenderness: Some tenderness palpation over the anterior aspect of the right shoulder    ROM: (Degrees)    Right   A P   Left   A P    Elevation  160    Elevation  160   Abduction  160    Abduction  160    ER   50    ER   45   IR   T7    IR   T7   90 abd/ER      90 abd/ER     90 abd/IR      90 abd/IR     Crepitation  No    Crepitation No  Dyskenesia  No    Dyskenesia No      Muscle strength:    Right       Left    Deltoid   5    Deltoid   5  Supraspinatus  3    Supraspinatus  5  ER   5    ER   5  IR   5    IR   5    Special tests    Right   Rotator Cuff    Left    n   Painful arc    n   n   Pain with ER    n    n   Neer's     n    n   Hawkin's    n    n   Drop Arm    n  n   Lift off/Belly Press   n  n   ER Lag    n          AC Joint  n   AC tenderness   n  n   Cross-chest adduction  n       Labrum/biceps    y (equivocal)  Sandoval's    n   n   Biceps sheer    n      n   Speed's/Yergason's   n    n   Tenderness Biceps Groove  n    n   George's    n         Instability  n   Ant Apprehension   n    n   Post Apprehension   n    n   Ant Load shift    n    n   Post Load shift   n   n   Sulcus     n  n   Generalized Laxity   n  n   Relocation test   n  n   Crank test     n  n   Devin-superior escape  n       Imaging:  MRI: MRI of the right shoulder completed on 7/21/21 was reviewed independently demonstrating a full-thickness tear of the supraspinatus and infraspinatus tendons with retraction medial to the glenoid associated with grade 4 fatty infiltration of the supraspinatus muscle. Biceps tendon appears to be intact. There does appear to be some narrowing of the acromiohumeral interval.    Impression/Plan:     Aniceto Maynard is a 64 y.o. old female with a chronic right shoulder massive rotator cuff tear that appears to be irreparable at this time. I had a discussion with the patient educating her about the nature and extent of her injury and discussed treatment options available to her including nonoperative and operative intervention. As noted above she indicates that she is doing fairly well at this time back to her baseline and so I recommended continued symptomatic management.   Certainly should she have a flareup of pain or any worsening symptoms we discussed follow-up for further evaluation and treatment. At this time I will see her back in my clinic as needed. This note is created with the assistance of a speech recognition program.  While intending to generate adocument that actually reflects the content of the visit, the document can still have some errors including those of syntax and sound a like substitutions which may escape proof reading. It such instances, actual meaningcan be extrapolated by contextual diversion.     NA = Not assessed  RTC = Rotator cuff  RCT = Rotator cuff tear  ER = External rotation  IR = Internal rotation  AC = Acromioclavicular  GH = Glenohumeral  n = No  y = Yes

## 2021-09-16 ENCOUNTER — OFFICE VISIT (OUTPATIENT)
Dept: ORTHOPEDIC SURGERY | Age: 56
End: 2021-09-16
Payer: COMMERCIAL

## 2021-09-16 VITALS
WEIGHT: 142 LBS | RESPIRATION RATE: 12 BRPM | HEIGHT: 65 IN | BODY MASS INDEX: 23.66 KG/M2 | SYSTOLIC BLOOD PRESSURE: 132 MMHG | HEART RATE: 83 BPM | DIASTOLIC BLOOD PRESSURE: 80 MMHG

## 2021-09-16 DIAGNOSIS — T84.033D MECHANICAL LOOSENING OF INTERNAL LEFT KNEE PROSTHETIC JOINT, SUBSEQUENT ENCOUNTER: ICD-10-CM

## 2021-09-16 DIAGNOSIS — T84.032D MECHANICAL LOOSENING OF INTERNAL RIGHT KNEE PROSTHETIC JOINT, SUBSEQUENT ENCOUNTER: ICD-10-CM

## 2021-09-16 DIAGNOSIS — M71.21 BAKER'S CYST OF KNEE, RIGHT: Primary | ICD-10-CM

## 2021-09-16 DIAGNOSIS — Z96.653 HISTORY OF BILATERAL KNEE REPLACEMENT: ICD-10-CM

## 2021-09-16 DIAGNOSIS — Z96.653 HISTORY OF BILATERAL KNEE REPLACEMENT: Primary | ICD-10-CM

## 2021-09-16 PROCEDURE — 99213 OFFICE O/P EST LOW 20 MIN: CPT | Performed by: PHYSICIAN ASSISTANT

## 2021-09-16 PROCEDURE — 20611 DRAIN/INJ JOINT/BURSA W/US: CPT | Performed by: PHYSICIAN ASSISTANT

## 2021-09-16 RX ORDER — METHYLPREDNISOLONE ACETATE 40 MG/ML
40 INJECTION, SUSPENSION INTRA-ARTICULAR; INTRALESIONAL; INTRAMUSCULAR; SOFT TISSUE ONCE
Status: COMPLETED | OUTPATIENT
Start: 2021-09-16 | End: 2021-09-16

## 2021-09-16 RX ORDER — LIDOCAINE HYDROCHLORIDE 10 MG/ML
6 INJECTION, SOLUTION INFILTRATION; PERINEURAL ONCE
Status: COMPLETED | OUTPATIENT
Start: 2021-09-16 | End: 2021-09-16

## 2021-09-16 RX ADMIN — METHYLPREDNISOLONE ACETATE 40 MG: 40 INJECTION, SUSPENSION INTRA-ARTICULAR; INTRALESIONAL; INTRAMUSCULAR; SOFT TISSUE at 11:43

## 2021-09-16 RX ADMIN — LIDOCAINE HYDROCHLORIDE 6 ML: 10 INJECTION, SOLUTION INFILTRATION; PERINEURAL at 11:43

## 2021-09-16 ASSESSMENT — ENCOUNTER SYMPTOMS
RESPIRATORY NEGATIVE: 1
DIARRHEA: 0
CHEST TIGHTNESS: 0
SHORTNESS OF BREATH: 0
VOMITING: 0
ABDOMINAL DISTENTION: 0
COLOR CHANGE: 0
CONSTIPATION: 0
ABDOMINAL PAIN: 0
COUGH: 0
APNEA: 0
NAUSEA: 0

## 2021-09-16 NOTE — PROGRESS NOTES
shortness of breath. Cardiovascular: Negative. Negative for chest pain, palpitations and leg swelling. Gastrointestinal: Negative for abdominal distention, abdominal pain, constipation, diarrhea, nausea and vomiting. Genitourinary: Negative for difficulty urinating, dysuria and hematuria. Musculoskeletal: Positive for arthralgias. Negative for gait problem, joint swelling and myalgias. Skin: Negative for color change and rash. Neurological: Negative for dizziness, weakness, numbness and headaches. Psychiatric/Behavioral: Negative for sleep disturbance.        Past Medical History:    Past Medical History:   Diagnosis Date    Abnormal Pap smear, low grade squamous intraepithelial lesion (LGSIL) 2004    Chronic headaches     Duodenal ulcer     history of ulcer over 1 yr ago (8/2016)    History of renal stone     passed    Melanoma of skin, site unspecified 2008    Malignant melanoma    MTHFR mutation     HETERO    Osteoarthritis of both knees     Pyloric stenosis     repaired as infant    Scoliosis        Past Surgical History:    Past Surgical History:   Procedure Laterality Date    EYE SURGERY      lazy eye    HYSTEROSCOPY  2008    KNEE ARTHROPLASTY Bilateral 09/21/2016    SKIN BIOPSY      SKIN CANCER EXCISION  2008       CurrentMedications:   Current Outpatient Medications   Medication Sig Dispense Refill    cephALEXin (KEFLEX) 500 MG capsule Take 1 capsule by mouth 4 times daily 4 capsules by mouth 1hr before dental procedure 4 capsule 0    meloxicam (MOBIC) 15 MG tablet Take 15 mg by mouth daily      Levocetirizine Dihydrochloride (XYZAL PO) Take by mouth      folic acid-pyridoxine-cyanocobalamine (FOLTX) 2.5-25-1 MG TABS tablet Take 1 tablet by mouth daily 30 tablet 12    ibuprofen (ADVIL) 200 MG tablet Take 200 mg by mouth every 6 hours as needed for Pain      acetaminophen (TYLENOL) 325 MG tablet Take 650 mg by mouth every 6 hours as needed for Pain      NONFORMULARY Iron over the counter 3 times daily      Meloxicam (MOBIC PO) Take 15 mg by mouth daily        No current facility-administered medications for this visit. Allergies:    Patient has no known allergies. Social History:   Social History     Socioeconomic History    Marital status:      Spouse name: None    Number of children: None    Years of education: None    Highest education level: None   Occupational History    None   Tobacco Use    Smoking status: Never Smoker    Smokeless tobacco: Never Used   Vaping Use    Vaping Use: Never used   Substance and Sexual Activity    Alcohol use: Yes     Comment: social    Drug use: No    Sexual activity: Yes     Partners: Male   Other Topics Concern    None   Social History Narrative    ** Merged History Encounter **          Social Determinants of Health     Financial Resource Strain: Low Risk     Difficulty of Paying Living Expenses: Not hard at all   Food Insecurity: No Food Insecurity    Worried About Running Out of Food in the Last Year: Never true    Saleem of Food in the Last Year: Never true   Transportation Needs: No Transportation Needs    Lack of Transportation (Medical): No    Lack of Transportation (Non-Medical):  No   Physical Activity:     Days of Exercise per Week:     Minutes of Exercise per Session:    Stress:     Feeling of Stress :    Social Connections:     Frequency of Communication with Friends and Family:     Frequency of Social Gatherings with Friends and Family:     Attends Sikhism Services:     Active Member of Clubs or Organizations:     Attends Club or Organization Meetings:     Marital Status:    Intimate Partner Violence: Not At Risk    Fear of Current or Ex-Partner: No    Emotionally Abused: No    Physically Abused: No    Sexually Abused: No       Family History:  Family History   Problem Relation Age of Onset    Cancer Father         COPD, smoker,    [facility] Diabetes Father     Hypertension Father  Brain Cancer Mother         brain tumor,    [facility] Thyroid Disease Mother     Diabetes Paternal Grandfather     Diabetes Paternal Grandmother     Osteoporosis Maternal Grandmother     Breast Cancer Maternal Grandmother     Stroke Maternal Grandfather     Arrhythmia Brother        Vitals:   /80   Pulse 83   Resp 12   Ht 5' 5\" (1.651 m)   Wt 142 lb (64.4 kg)   BMI 23.63 kg/m²  Body mass index is 23.63 kg/m². Physical Examination:     Orthopedics:    GENERAL: Alert and oriented X3 in no acute distress. SKIN: Intact without lesions or ulcerations. NEURO: Intact to sensory and motor testing. VASC: Capillary refill is less than 3 seconds. KNEE EXAM     LOCATION: Bilateral Knee  GEN: Alert and oriented X 3, in no acute distress. GAIT: The patient's gait was observed while entering the exam room and was noted to be non antalgic. The extremity is in anatomic alignment. SKIN: Intact without rashes, lesions, or ulcerations. No obvious deformity or swelling. NEURO: The patient responds to light touch throughout bilateral LE. Patellar and Achilles reflexes are 2/4. VASC: The bilateral LE is neurovascularly intact with 2/4 DP and 2/4 PT pulses. Brisk capillary refill. ROM: Right: 0/120 degrees. There is no effusion. Left: 0/118 degrees. There is no effusion. MUSC: good quad tone  LIGAMENT: There is No varus instability at 0 degrees and No varus instability at 30 degrees. There is No valgus instability at 0 degrees and No valgus instability at 30 degrees. PALP: There is no joint line pain. Large baker's cyst noted posteriorly in the Right knee. Assessment:     1. Baker's cyst of knee, right    2. History of bilateral knee replacement    3. Mechanical loosening of internal right knee prosthetic joint, subsequent encounter    4.  Mechanical loosening of internal left knee prosthetic joint, subsequent encounter      Procedures:    Procedure: yes    Joint aspiration of the Baker's cyst of the right knee. The patient was placed in the supine position on the exam table. The superior lateral portal was identified and marked. The skin was prepped with betadine in a sterile fashion. Utilizing ultrasound for precise placement and clean technique with sterile gloves a 2cc solution containing 1.0% Lidocaine was injected. There was no resistance to the injection. Thereafter the skin was prepped with betadine in a sterile fashion once again and the joint was aspirated with a 60cc syringe. After aspirating the joint, 15 cc's of gelatinous synovial fluid was removed from the knee. Cyst was then injected with 4 cc of 1% lidocaine and 1 cc of 40 Depo-Medrol. The wound was then cleansed and a band-aid was placed over the superior lateral portal site. The patient tolerated the procedure without difficulty. Adverse reactions to the aspiration were discussed with the patient including signs of infection (increasing pain, redness, swelling) and the patient was instructed to call immediately if experiencing any of these symptoms. Radiology:   No results found. Plan:   Treatment : I reviewed the x-ray with the patient and I informed them that the x-ray does not show any significant change since the previous x-ray. We discussed the etiologies and natural histories of loosening of her prosthesis. We discussed the various treatment alternatives including anti-inflammatory medications, physical therapy, injections, further imaging studies and as a last result surgery. During today's visit, we discussed that I do not see any significant change since her previous x-ray 6 months ago. I advised that she get new x-rays every 6 months. The patient states she is not interested in having any surgical procedures done at this time. . The patient has opted for an aspiration and a cortisone injection into the right Baker's cyst to help reduce inflammation and pain.  The injection site should never get red, hot, or swollen and if it does the patient will contact our office right away. The patient may experience a increase in soreness the first 24-48 hours due to a cortisone flair and can take anti-inflammatories for a short period of time to reduce that soreness. The patient should not submerge the injection site in water for a minimum of 24 hours to avoid infection. This means no lakes, pools, ponds, or hot tubs for 24 hours. If the patient is diabetic the injection may increase their blood sugar for up to one week. The patient can do this cortisone injection once every 3 months as needed. If the injections stop working and do not give the patient relief the patient should consider surgical interventions to produce long term relief. Patient will follow up in 6 months with PCXR of her bilateral knees. The patient will call the office immediately with any problems. Orders Placed This Encounter   Medications    methylPREDNISolone acetate (DEPO-MEDROL) injection 40 mg    lidocaine 1 % injection 6 mL       No orders of the defined types were placed in this encounter.         Electronically signed by Mario Hernandez PA-C on 9/16/2021 at 4:43 PM

## 2021-10-29 ENCOUNTER — HOSPITAL ENCOUNTER (OUTPATIENT)
Age: 56
Setting detail: SPECIMEN
Discharge: HOME | End: 2021-10-29
Payer: COMMERCIAL

## 2021-11-02 LAB — DERMATOLOGY PATHOLOGY REPORT: NORMAL

## 2021-12-07 ENCOUNTER — TELEPHONE (OUTPATIENT)
Dept: ORTHOPEDIC SURGERY | Age: 56
End: 2021-12-07

## 2021-12-07 ENCOUNTER — HOSPITAL ENCOUNTER (OUTPATIENT)
Age: 56
Discharge: HOME | End: 2021-12-07
Payer: COMMERCIAL

## 2021-12-07 LAB
-: NORMAL
ABSOLUTE EOS #: 0.52 K/UL (ref 0–0.44)
ABSOLUTE IMMATURE GRANULOCYTE: 0.03 K/UL (ref 0–0.3)
ABSOLUTE LYMPH #: 2.12 K/UL (ref 1.1–3.7)
ABSOLUTE MONO #: 0.61 K/UL (ref 0.1–1.2)
ALBUMIN SERPL-MCNC: 4.3 G/DL (ref 3.5–5.2)
ALBUMIN/GLOBULIN RATIO: 2.3 (ref 1–2.5)
ALP BLD-CCNC: 91 U/L (ref 35–104)
ALT SERPL-CCNC: 19 U/L (ref 5–33)
AMORPHOUS: NORMAL
ANION GAP SERPL CALCULATED.3IONS-SCNC: 12 MMOL/L (ref 9–17)
AST SERPL-CCNC: 26 U/L
BACTERIA: NORMAL
BASOPHILS # BLD: 1 % (ref 0–2)
BASOPHILS ABSOLUTE: 0.05 K/UL (ref 0–0.2)
BILIRUB SERPL-MCNC: 0.22 MG/DL (ref 0.3–1.2)
BILIRUBIN URINE: NEGATIVE
BUN BLDV-MCNC: 28 MG/DL (ref 6–20)
BUN/CREAT BLD: ABNORMAL (ref 9–20)
CALCIUM SERPL-MCNC: 10.2 MG/DL (ref 8.6–10.4)
CASTS UA: NORMAL /LPF (ref 0–8)
CHLORIDE BLD-SCNC: 105 MMOL/L (ref 98–107)
CHOLESTEROL/HDL RATIO: 3.5
CHOLESTEROL: 201 MG/DL
CO2: 24 MMOL/L (ref 20–31)
COLOR: YELLOW
COMMENT UA: ABNORMAL
CREAT SERPL-MCNC: 0.81 MG/DL (ref 0.5–0.9)
CRYSTALS, UA: NORMAL /HPF
DIFFERENTIAL TYPE: ABNORMAL
EOSINOPHILS RELATIVE PERCENT: 6 % (ref 1–4)
EPITHELIAL CELLS UA: NORMAL /HPF (ref 0–5)
GFR AFRICAN AMERICAN: >60 ML/MIN
GFR NON-AFRICAN AMERICAN: >60 ML/MIN
GFR SERPL CREATININE-BSD FRML MDRD: ABNORMAL ML/MIN/{1.73_M2}
GFR SERPL CREATININE-BSD FRML MDRD: ABNORMAL ML/MIN/{1.73_M2}
GLUCOSE BLD-MCNC: 80 MG/DL (ref 70–99)
GLUCOSE URINE: NEGATIVE
HCT VFR BLD CALC: 44.4 % (ref 36.3–47.1)
HDLC SERPL-MCNC: 57 MG/DL
HEMOGLOBIN: 14.1 G/DL (ref 11.9–15.1)
IMMATURE GRANULOCYTES: 0 %
KETONES, URINE: NEGATIVE
LDL CHOLESTEROL: 128 MG/DL (ref 0–130)
LEUKOCYTE ESTERASE, URINE: NEGATIVE
LYMPHOCYTES # BLD: 25 % (ref 24–43)
MCH RBC QN AUTO: 27.6 PG (ref 25.2–33.5)
MCHC RBC AUTO-ENTMCNC: 31.8 G/DL (ref 28.4–34.8)
MCV RBC AUTO: 86.9 FL (ref 82.6–102.9)
MONOCYTES # BLD: 7 % (ref 3–12)
MUCUS: NORMAL
NITRITE, URINE: NEGATIVE
NRBC AUTOMATED: 0 PER 100 WBC
OTHER OBSERVATIONS UA: NORMAL
PDW BLD-RTO: 13.3 % (ref 11.8–14.4)
PH UA: 7 (ref 5–8)
PLATELET # BLD: 308 K/UL (ref 138–453)
PLATELET ESTIMATE: ABNORMAL
PMV BLD AUTO: 10.5 FL (ref 8.1–13.5)
POTASSIUM SERPL-SCNC: 3.8 MMOL/L (ref 3.7–5.3)
PROTEIN UA: NEGATIVE
RBC # BLD: 5.11 M/UL (ref 3.95–5.11)
RBC # BLD: ABNORMAL 10*6/UL
RBC UA: NORMAL /HPF (ref 0–4)
RENAL EPITHELIAL, UA: NORMAL /HPF
SEG NEUTROPHILS: 61 % (ref 36–65)
SEGMENTED NEUTROPHILS ABSOLUTE COUNT: 5.24 K/UL (ref 1.5–8.1)
SODIUM BLD-SCNC: 141 MMOL/L (ref 135–144)
SPECIFIC GRAVITY UA: 1.02 (ref 1–1.03)
TOTAL PROTEIN: 6.2 G/DL (ref 6.4–8.3)
TRICHOMONAS: NORMAL
TRIGL SERPL-MCNC: 78 MG/DL
TURBIDITY: ABNORMAL
URINE HGB: NEGATIVE
UROBILINOGEN, URINE: NORMAL
VLDLC SERPL CALC-MCNC: ABNORMAL MG/DL (ref 1–30)
WBC # BLD: 8.6 K/UL (ref 3.5–11.3)
WBC # BLD: ABNORMAL 10*3/UL
WBC UA: NORMAL /HPF (ref 0–5)
YEAST: NORMAL

## 2021-12-07 PROCEDURE — 80053 COMPREHEN METABOLIC PANEL: CPT

## 2021-12-07 PROCEDURE — 85025 COMPLETE CBC W/AUTO DIFF WBC: CPT

## 2021-12-07 PROCEDURE — 36415 COLL VENOUS BLD VENIPUNCTURE: CPT

## 2021-12-07 PROCEDURE — 81001 URINALYSIS AUTO W/SCOPE: CPT

## 2021-12-07 PROCEDURE — 80061 LIPID PANEL: CPT

## 2021-12-08 ENCOUNTER — HOSPITAL ENCOUNTER (OUTPATIENT)
Age: 56
Setting detail: SPECIMEN
Discharge: HOME | End: 2021-12-08
Payer: COMMERCIAL

## 2021-12-08 DIAGNOSIS — Z96.653 HISTORY OF BILATERAL KNEE REPLACEMENT: Primary | ICD-10-CM

## 2021-12-08 LAB
DATE, STOOL #1: 12
DATE, STOOL #2: NORMAL
DATE, STOOL #3: NORMAL
HEMOCCULT SP1 STL QL: NEGATIVE
HEMOCCULT SP2 STL QL: NORMAL
HEMOCCULT SP3 STL QL: NORMAL
TIME, STOOL #1: 645
TIME, STOOL #2: NORMAL
TIME, STOOL #3: NORMAL

## 2021-12-08 PROCEDURE — 82270 OCCULT BLOOD FECES: CPT

## 2021-12-08 RX ORDER — CEPHALEXIN 500 MG/1
2000 CAPSULE ORAL ONCE
Qty: 4 CAPSULE | Refills: 0 | Status: SHIPPED | OUTPATIENT
Start: 2021-12-08 | End: 2021-12-08

## 2021-12-10 ENCOUNTER — HOSPITAL ENCOUNTER (OUTPATIENT)
Age: 56
Setting detail: SPECIMEN
Discharge: HOME | End: 2021-12-10
Payer: COMMERCIAL

## 2021-12-10 LAB
-: NORMAL
AMORPHOUS: NORMAL
BACTERIA: NORMAL
BILIRUBIN URINE: NEGATIVE
CASTS UA: NORMAL /LPF (ref 0–8)
COLOR: YELLOW
COMMENT UA: ABNORMAL
CRYSTALS, UA: NORMAL /HPF
EPITHELIAL CELLS UA: NORMAL /HPF (ref 0–5)
GLUCOSE URINE: NEGATIVE
KETONES, URINE: NEGATIVE
LEUKOCYTE ESTERASE, URINE: ABNORMAL
MUCUS: NORMAL
NITRITE, URINE: NEGATIVE
OTHER OBSERVATIONS UA: NORMAL
PH UA: 6 (ref 5–8)
PROTEIN UA: NEGATIVE
RBC UA: NORMAL /HPF (ref 0–4)
RENAL EPITHELIAL, UA: NORMAL /HPF
SPECIFIC GRAVITY UA: 1.01 (ref 1–1.03)
TRICHOMONAS: NORMAL
TURBIDITY: CLEAR
URINE HGB: NEGATIVE
UROBILINOGEN, URINE: NORMAL
WBC UA: NORMAL /HPF (ref 0–5)
YEAST: NORMAL

## 2021-12-10 PROCEDURE — 81001 URINALYSIS AUTO W/SCOPE: CPT

## 2021-12-28 ENCOUNTER — ANESTHESIA EVENT (OUTPATIENT)
Dept: OPERATING ROOM | Age: 56
End: 2021-12-28
Payer: COMMERCIAL

## 2021-12-30 ENCOUNTER — ANESTHESIA (OUTPATIENT)
Dept: OPERATING ROOM | Age: 56
End: 2021-12-30
Payer: COMMERCIAL

## 2021-12-30 ENCOUNTER — HOSPITAL ENCOUNTER (OUTPATIENT)
Age: 56
Setting detail: OUTPATIENT SURGERY
Discharge: HOME | End: 2021-12-30
Attending: INTERNAL MEDICINE | Admitting: INTERNAL MEDICINE
Payer: COMMERCIAL

## 2021-12-30 VITALS
RESPIRATION RATE: 13 BRPM | HEART RATE: 46 BPM | TEMPERATURE: 97.5 F | HEIGHT: 65 IN | SYSTOLIC BLOOD PRESSURE: 145 MMHG | OXYGEN SATURATION: 100 % | WEIGHT: 140 LBS | BODY MASS INDEX: 23.32 KG/M2 | DIASTOLIC BLOOD PRESSURE: 83 MMHG

## 2021-12-30 VITALS — DIASTOLIC BLOOD PRESSURE: 71 MMHG | SYSTOLIC BLOOD PRESSURE: 130 MMHG | OXYGEN SATURATION: 99 %

## 2021-12-30 PROCEDURE — 7100000010 HC PHASE II RECOVERY - FIRST 15 MIN: Performed by: INTERNAL MEDICINE

## 2021-12-30 PROCEDURE — 3609012400 HC EGD TRANSORAL BIOPSY SINGLE/MULTIPLE: Performed by: INTERNAL MEDICINE

## 2021-12-30 PROCEDURE — 2580000003 HC RX 258: Performed by: NURSE ANESTHETIST, CERTIFIED REGISTERED

## 2021-12-30 PROCEDURE — 3700000001 HC ADD 15 MINUTES (ANESTHESIA): Performed by: INTERNAL MEDICINE

## 2021-12-30 PROCEDURE — 88305 TISSUE EXAM BY PATHOLOGIST: CPT

## 2021-12-30 PROCEDURE — 2500000003 HC RX 250 WO HCPCS: Performed by: NURSE ANESTHETIST, CERTIFIED REGISTERED

## 2021-12-30 PROCEDURE — 7100000011 HC PHASE II RECOVERY - ADDTL 15 MIN: Performed by: INTERNAL MEDICINE

## 2021-12-30 PROCEDURE — 2580000003 HC RX 258: Performed by: ANESTHESIOLOGY

## 2021-12-30 PROCEDURE — 88342 IMHCHEM/IMCYTCHM 1ST ANTB: CPT

## 2021-12-30 PROCEDURE — 3700000000 HC ANESTHESIA ATTENDED CARE: Performed by: INTERNAL MEDICINE

## 2021-12-30 PROCEDURE — 2709999900 HC NON-CHARGEABLE SUPPLY: Performed by: INTERNAL MEDICINE

## 2021-12-30 PROCEDURE — 6360000002 HC RX W HCPCS: Performed by: NURSE ANESTHETIST, CERTIFIED REGISTERED

## 2021-12-30 RX ORDER — LIDOCAINE HYDROCHLORIDE 10 MG/ML
1 INJECTION, SOLUTION EPIDURAL; INFILTRATION; INTRACAUDAL; PERINEURAL
Status: DISCONTINUED | OUTPATIENT
Start: 2021-12-30 | End: 2021-12-30 | Stop reason: HOSPADM

## 2021-12-30 RX ORDER — SODIUM CHLORIDE 0.9 % (FLUSH) 0.9 %
10 SYRINGE (ML) INJECTION EVERY 12 HOURS SCHEDULED
Status: DISCONTINUED | OUTPATIENT
Start: 2021-12-30 | End: 2021-12-30 | Stop reason: HOSPADM

## 2021-12-30 RX ORDER — LIDOCAINE HYDROCHLORIDE 20 MG/ML
INJECTION, SOLUTION EPIDURAL; INFILTRATION; INTRACAUDAL; PERINEURAL PRN
Status: DISCONTINUED | OUTPATIENT
Start: 2021-12-30 | End: 2021-12-30 | Stop reason: SDUPTHER

## 2021-12-30 RX ORDER — SODIUM CHLORIDE, SODIUM LACTATE, POTASSIUM CHLORIDE, CALCIUM CHLORIDE 600; 310; 30; 20 MG/100ML; MG/100ML; MG/100ML; MG/100ML
INJECTION, SOLUTION INTRAVENOUS CONTINUOUS
Status: DISCONTINUED | OUTPATIENT
Start: 2021-12-30 | End: 2021-12-30 | Stop reason: HOSPADM

## 2021-12-30 RX ORDER — SODIUM CHLORIDE 0.9 % (FLUSH) 0.9 %
10 SYRINGE (ML) INJECTION PRN
Status: DISCONTINUED | OUTPATIENT
Start: 2021-12-30 | End: 2021-12-30 | Stop reason: HOSPADM

## 2021-12-30 RX ORDER — SODIUM CHLORIDE, SODIUM LACTATE, POTASSIUM CHLORIDE, CALCIUM CHLORIDE 600; 310; 30; 20 MG/100ML; MG/100ML; MG/100ML; MG/100ML
INJECTION, SOLUTION INTRAVENOUS CONTINUOUS PRN
Status: DISCONTINUED | OUTPATIENT
Start: 2021-12-30 | End: 2021-12-30 | Stop reason: SDUPTHER

## 2021-12-30 RX ORDER — SODIUM CHLORIDE 9 MG/ML
INJECTION, SOLUTION INTRAVENOUS CONTINUOUS
Status: DISCONTINUED | OUTPATIENT
Start: 2021-12-30 | End: 2021-12-30 | Stop reason: HOSPADM

## 2021-12-30 RX ORDER — SODIUM CHLORIDE 9 MG/ML
25 INJECTION, SOLUTION INTRAVENOUS PRN
Status: DISCONTINUED | OUTPATIENT
Start: 2021-12-30 | End: 2021-12-30 | Stop reason: HOSPADM

## 2021-12-30 RX ORDER — VIT C/B6/B5/MAGNESIUM/HERB 173 50-5-6-5MG
1000 CAPSULE ORAL DAILY
COMMUNITY

## 2021-12-30 RX ORDER — PROPOFOL 10 MG/ML
INJECTION, EMULSION INTRAVENOUS PRN
Status: DISCONTINUED | OUTPATIENT
Start: 2021-12-30 | End: 2021-12-30 | Stop reason: SDUPTHER

## 2021-12-30 RX ADMIN — PROPOFOL 90 MG: 10 INJECTION, EMULSION INTRAVENOUS at 13:38

## 2021-12-30 RX ADMIN — PROPOFOL 50 MG: 10 INJECTION, EMULSION INTRAVENOUS at 13:44

## 2021-12-30 RX ADMIN — SODIUM CHLORIDE, POTASSIUM CHLORIDE, SODIUM LACTATE AND CALCIUM CHLORIDE: 600; 310; 30; 20 INJECTION, SOLUTION INTRAVENOUS at 13:34

## 2021-12-30 RX ADMIN — LIDOCAINE HYDROCHLORIDE 60 MG: 20 INJECTION, SOLUTION EPIDURAL; INFILTRATION; INTRACAUDAL; PERINEURAL at 13:38

## 2021-12-30 RX ADMIN — SODIUM CHLORIDE, POTASSIUM CHLORIDE, SODIUM LACTATE AND CALCIUM CHLORIDE: 600; 310; 30; 20 INJECTION, SOLUTION INTRAVENOUS at 12:48

## 2021-12-30 ASSESSMENT — PULMONARY FUNCTION TESTS
PIF_VALUE: 1
PIF_VALUE: 0
PIF_VALUE: 1
PIF_VALUE: 0
PIF_VALUE: 1

## 2021-12-30 ASSESSMENT — PAIN - FUNCTIONAL ASSESSMENT: PAIN_FUNCTIONAL_ASSESSMENT: 0-10

## 2021-12-30 NOTE — H&P
Interval H&P Note    Pt Name: Erwin Virk  MRN: [id number]  YOB: 1965  Date of evaluation: 12/30/2021      [x] I have reviewed the hard copy gastroenterology progress note by Caterina Clayton NP dated 12/7/2021 labeled in short chart which meets the criteria for an Interval History and Physical note. [x] I have examined  Margaret Barbour  There are no changes to the patient who is scheduled for a EGD by Dr. Cyndra Bosworth for epigastric pain. The patient denies new health changes, fever, chills, wheezing, cough, increased SOB, chest pain, open sores or wounds. Denies hx diabetes. Last Turmeric 12/29/2021. Vital signs: /66   Pulse 70   Temp 97.5 °F (36.4 °C) (Temporal)   Resp 16   Ht 5' 5\" (1.651 m)   Wt 140 lb (63.5 kg)   LMP  (LMP Unknown)   SpO2 96%   BMI 23.30 kg/m²     Allergies:  Patient has no known allergies. Medications:    Prior to Admission medications    Medication Sig Start Date End Date Taking?  Authorizing Provider   turmeric 500 MG CAPS Take 1,000 mg by mouth daily   Yes Historical Provider, MD   acetaminophen (TYLENOL) 325 MG tablet Take 650 mg by mouth every 6 hours as needed for Pain   Yes Historical Provider, MD   cephALEXin (KEFLEX) 500 MG capsule Take 1 capsule by mouth 4 times daily 4 capsules by mouth 1hr before dental procedure 5/28/21   Jhonny Carty DO   meloxicam (MOBIC) 15 MG tablet Take 15 mg by mouth daily    Historical Provider, MD   Levocetirizine Dihydrochloride (XYZAL PO) Take by mouth    Historical Provider, MD   ibuprofen (ADVIL) 200 MG tablet Take 200 mg by mouth every 6 hours as needed for Pain    Historical Provider, MD   Meloxicam (MOBIC PO) Take 15 mg by mouth daily     Historical Provider, MD         Past Medical History:     Past Medical History:   Diagnosis Date    Abnormal Pap smear, low grade squamous intraepithelial lesion (LGSIL) 2004    Chronic headaches     Duodenal ulcer     history of ulcer over 1 yr ago (8/2016)    History of renal stone passed    Melanoma of skin, site unspecified 2008    Malignant melanoma    MTHFR mutation     HETERO    Osteoarthritis of both knees     Pyloric stenosis     repaired as infant    Scoliosis         Past Surgical History:     Past Surgical History:   Procedure Laterality Date    EYE SURGERY      lazy eye    HYSTEROSCOPY  2008    KNEE ARTHROPLASTY Bilateral 09/21/2016    SKIN BIOPSY      SKIN CANCER EXCISION  2008       Social History:     Social History     Socioeconomic History    Marital status:      Spouse name: None    Number of children: None    Years of education: None    Highest education level: None   Occupational History    None   Tobacco Use    Smoking status: Never Smoker    Smokeless tobacco: Never Used   Vaping Use    Vaping Use: Never used   Substance and Sexual Activity    Alcohol use: Yes     Comment: social    Drug use: No    Sexual activity: Yes     Partners: Male   Other Topics Concern    None   Social History Narrative    ** Merged History Encounter **          Social Determinants of Health     Financial Resource Strain:     Difficulty of Paying Living Expenses: Not on file   Food Insecurity:     Worried About Running Out of Food in the Last Year: Not on file    Saleem of Food in the Last Year: Not on file   Transportation Needs:     Lack of Transportation (Medical): Not on file    Lack of Transportation (Non-Medical):  Not on file   Physical Activity:     Days of Exercise per Week: Not on file    Minutes of Exercise per Session: Not on file   Stress:     Feeling of Stress : Not on file   Social Connections:     Frequency of Communication with Friends and Family: Not on file    Frequency of Social Gatherings with Friends and Family: Not on file    Attends Judaism Services: Not on file    Active Member of Clubs or Organizations: Not on file    Attends Club or Organization Meetings: Not on file    Marital Status: Not on file   Intimate Partner Violence:     Fear of Current or Ex-Partner: Not on file    Emotionally Abused: Not on file    Physically Abused: Not on file    Sexually Abused: Not on file   Housing Stability:     Unable to Pay for Housing in the Last Year: Not on file    Number of Places Lived in the Last Year: Not on file    Unstable Housing in the Last Year: Not on file       Family History:     Family History   Problem Relation Age of Onset    Cancer Father         COPD, smoker,    [place] Diabetes Father     Hypertension Father     Brain Cancer Mother         brain tumor,    [place] Thyroid Disease Mother     Diabetes Paternal Grandfather     Diabetes Paternal Grandmother     Osteoporosis Maternal Grandmother     Breast Cancer Maternal Grandmother     Stroke Maternal Grandfather     Arrhythmia Brother      This is a 64 y.o. female who is pleasant, cooperative, alert and oriented x3, in no acute distress. Heart: Heart sounds are normal.  HR 70 regular rate and rhythm without murmur, gallop or rub. Lungs: Normal respiratory effort with equal expansion, good air exchange, unlabored and clear to auscultation without wheezes or rales bilaterally   Abdomen: soft, nontender, nondistended with bowel sounds active. Labs:  Recent Labs     21  1408   HGB 14.1   HCT 44.4   WBC 8.6   MCV 86.9         K 3.8      CO2 24   BUN 28*   CREATININE 0.81   GLUCOSE 80   AST 26   ALT 19   LABALBU 4.3       No results for input(s): COVID19 in the last 720 hours.     DENISE Winter CNP  Electronically signed 2021 at 12:43 PM

## 2021-12-30 NOTE — ANESTHESIA PRE PROCEDURE
Department of Anesthesiology  Preprocedure Note       Name:  Gema Carlin   Age:  64 y.o.  :  1965                                          MRN:  [id number]         Date:  2021      Surgeon: Charisse Farmer):  Lana Holland MD    Procedure: Procedure(s):  EGD ESOPHAGOGASTRODUODENOSCOPY    Medications prior to admission:   Prior to Admission medications    Medication Sig Start Date End Date Taking?  Authorizing Provider   turmeric 500 MG CAPS Take 1,000 mg by mouth daily   Yes Historical Provider, MD   acetaminophen (TYLENOL) 325 MG tablet Take 650 mg by mouth every 6 hours as needed for Pain   Yes Historical Provider, MD   cephALEXin (KEFLEX) 500 MG capsule Take 1 capsule by mouth 4 times daily 4 capsules by mouth 1hr before dental procedure 21   Artice Favors, DO   meloxicam (MOBIC) 15 MG tablet Take 15 mg by mouth daily    Historical Provider, MD   Levocetirizine Dihydrochloride (XYZAL PO) Take by mouth    Historical Provider, MD   ibuprofen (ADVIL) 200 MG tablet Take 200 mg by mouth every 6 hours as needed for Pain    Historical Provider, MD   Meloxicam (MOBIC PO) Take 15 mg by mouth daily     Historical Provider, MD       Current medications:    Current Facility-Administered Medications   Medication Dose Route Frequency Provider Last Rate Last Admin    0.9 % sodium chloride infusion   IntraVENous Continuous Jessica Orona MD        lactated ringers infusion   IntraVENous Continuous Rick Martin  mL/hr at 21 1248 New Bag at 21 1248    sodium chloride flush 0.9 % injection 10 mL  10 mL IntraVENous 2 times per day Rick Martin MD        sodium chloride flush 0.9 % injection 10 mL  10 mL IntraVENous PRN Jessica Orona MD        0.9 % sodium chloride infusion  25 mL IntraVENous PRN Jessica Orona MD        lidocaine PF 1 % injection 1 mL  1 mL IntraDERmal Once PRN Rick Martin MD           Allergies:  No Known Allergies    Problem List:    Patient Active Problem List   Diagnosis Code    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    Melanoma of skin ([address] C43.9    Breast cyst N60.09    Status post total bilateral knee replacement Z96.653    Primary osteoarthritis of both knees M17.0    Hx of peptic ulcer Z87.11    Low back pain with right-sided sciatica M54.41    Breast mass, right N63.10       Past Medical History:        Diagnosis Date    Abnormal Pap smear, low grade squamous intraepithelial lesion (LGSIL) 2004    Chronic headaches     Duodenal ulcer     history of ulcer over 1 yr ago (8/2016)    History of renal stone     passed    Melanoma of skin, site unspecified 2008    Malignant melanoma    MTHFR mutation     HETERO    Osteoarthritis of both knees     Pyloric stenosis     repaired as infant    Scoliosis        Past Surgical History:        Procedure Laterality Date    EYE SURGERY      lazy eye    HYSTEROSCOPY  2008    KNEE ARTHROPLASTY Bilateral 09/21/2016    SKIN BIOPSY      SKIN CANCER EXCISION  2008       Social History:    Social History     Tobacco Use    Smoking status: Never Smoker    Smokeless tobacco: Never Used   Substance Use Topics    Alcohol use: Yes     Comment: social                                Counseling given: Not Answered      Vital Signs (Current):   Vitals:    12/30/21 1214 12/30/21 1227   BP: 122/66    Pulse: 70    Resp: 16    Temp: 97.5 °F (36.4 °C)    TempSrc: Temporal    SpO2: 96%    Weight:  140 lb (63.5 kg)   Height:  5' 5\" (1.651 m)                                              BP Readings from Last 3 Encounters:   12/30/21 122/66   09/16/21 132/80   07/02/21 (!) 142/76       NPO Status: Time of last liquid consumption: 2130                        Time of last solid consumption: 2030                        Date of last liquid consumption: 12/29/21                        Date of last solid food consumption: 12/29/21    BMI:   Wt Readings from Last 3 Encounters:   12/30/21 140 lb (63.5 kg)   09/16/21 142 lb (64.4 kg)   08/23/21 141 lb (64 kg)     Body mass index is 23.3 kg/m². CBC:   Lab Results   Component Value Date    WBC 8.6 12/07/2021    RBC 5.11 12/07/2021    HGB 14.1 12/07/2021    HCT 44.4 12/07/2021    MCV 86.9 12/07/2021    RDW 13.3 12/07/2021     12/07/2021       CMP:   Lab Results   Component Value Date     12/07/2021    K 3.8 12/07/2021     12/07/2021    CO2 24 12/07/2021    BUN 28 12/07/2021    CREATININE 0.81 12/07/2021    GFRAA >60 12/07/2021    LABGLOM >60 12/07/2021    GLUCOSE 80 12/07/2021    PROT 6.2 12/07/2021    CALCIUM 10.2 12/07/2021    BILITOT 0.22 12/07/2021    ALKPHOS 91 12/07/2021    AST 26 12/07/2021    ALT 19 12/07/2021       POC Tests: No results for input(s): POCGLU, POCNA, POCK, POCCL, POCBUN, POCHEMO, POCHCT in the last 72 hours.     Coags:   Lab Results   Component Value Date    PROTIME 10.1 08/31/2016    INR 1.0 08/31/2016    APTT 25.7 08/31/2016       HCG (If Applicable):   Lab Results   Component Value Date    PREGTESTUR NEGATIVE 03/22/2014    HCG NEGATIVE 09/21/2016        ABGs: No results found for: PHART, PO2ART, TZC0KUL, NDG1HMB, BEART, S3JMZPDT     Type & Screen (If Applicable):  No results found for: LABABO, LABRH    Drug/Infectious Status (If Applicable):  No results found for: HIV, HEPCAB    COVID-19 Screening (If Applicable): No results found for: COVID19        Anesthesia Evaluation  Patient summary reviewed and Nursing notes reviewed no history of anesthetic complications:   Airway: Mallampati: II  TM distance: >3 FB   Neck ROM: full  Mouth opening: > = 3 FB Dental: normal exam         Pulmonary:normal exam        (-) COPD and asthma                           Cardiovascular:  Exercise tolerance: good (>4 METS),       (-) hypertension, past MI and CAD        Rate: normal                    Neuro/Psych:   (+) headaches:,             GI/Hepatic/Renal:        (-) GERD       Endo/Other:        (-) diabetes mellitus               Abdominal: Vascular: Other Findings:             Anesthesia Plan      MAC and general     ASA 2       Induction: intravenous. MIPS: prophylactic pharmacologic antiemetic agents not administered perioperatively for documented reasons. Anesthetic plan and risks discussed with patient. Plan discussed with CRNA.     Attending anesthesiologist reviewed and agrees with Preprocedure content              Daquan Muhammad DO   12/30/2021

## 2022-01-06 LAB — SURGICAL PATHOLOGY REPORT: NORMAL

## 2022-03-31 ENCOUNTER — OFFICE VISIT (OUTPATIENT)
Dept: ORTHOPEDIC SURGERY | Age: 57
End: 2022-03-31
Payer: COMMERCIAL

## 2022-03-31 VITALS
HEIGHT: 65 IN | DIASTOLIC BLOOD PRESSURE: 79 MMHG | HEART RATE: 64 BPM | BODY MASS INDEX: 23.32 KG/M2 | RESPIRATION RATE: 12 BRPM | SYSTOLIC BLOOD PRESSURE: 112 MMHG | WEIGHT: 140 LBS

## 2022-03-31 DIAGNOSIS — R60.0 LOWER LEG EDEMA: ICD-10-CM

## 2022-03-31 DIAGNOSIS — M71.22 BAKER'S CYST, LEFT: ICD-10-CM

## 2022-03-31 DIAGNOSIS — Z96.653 HISTORY OF BILATERAL KNEE REPLACEMENT: Primary | ICD-10-CM

## 2022-03-31 DIAGNOSIS — M71.21 BAKER'S CYST, RIGHT: ICD-10-CM

## 2022-03-31 PROCEDURE — 99213 OFFICE O/P EST LOW 20 MIN: CPT | Performed by: PHYSICIAN ASSISTANT

## 2022-03-31 ASSESSMENT — ENCOUNTER SYMPTOMS
NAUSEA: 0
SHORTNESS OF BREATH: 0
ABDOMINAL DISTENTION: 0
APNEA: 0
COLOR CHANGE: 0
RESPIRATORY NEGATIVE: 1
ABDOMINAL PAIN: 0
CONSTIPATION: 0
COUGH: 0
CHEST TIGHTNESS: 0
VOMITING: 0
DIARRHEA: 0

## 2022-03-31 NOTE — PROGRESS NOTES
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 The pain has been present for over 2 years. The patient recalls a specific injury where the knee got hit off the ground on 11/05/2019 when she was walking her dog. Patient states that she was out walking her dog that day and the dog began to take off and he pulled her to the ground causing her to hit the knee off the ground. Patient states that as of now her knees are feeling pretty good. She does not feel that the Baker's cyst is causing any issues at this time. . The pain is now described as nonexistent. There is no pain on weight bearing. The knee has swelled. There is no painful popping and clicking. The knee has not caught or locked up. The knee has not given out. It is not stiff upon arising from sitting. It is not painful to go up and down stairs and sit for a prolonged time. The patient has had  an injection and aspiration of her Baker's cyst on the right. The patient has tried physical therapy after her surgeries on both knees. The patient has had surgery and it was a bilateral TKA that was done on 09/21/2016. Therefore we are almost 6 years post op. She had opinions from both Dr. Rhonda Guzman and Dr. Katherine Fothergill about possible knee revisions. She states she does not feel she needs those at this time. She is here for a 6-month check and x-rays of both knees. ROS:   Review of Systems   Constitutional: Positive for activity change. Negative for appetite change, fatigue and fever. Respiratory: Negative.   Negative for apnea, cough, chest tightness and shortness of breath. Cardiovascular: Negative. Negative for chest pain, palpitations and leg swelling. Gastrointestinal: Negative for abdominal distention, abdominal pain, constipation, diarrhea, nausea and vomiting. Genitourinary: Negative for difficulty urinating, dysuria and hematuria. Musculoskeletal: Positive for joint swelling. Negative for arthralgias, gait problem and myalgias. Skin: Negative for color change and rash. Neurological: Negative for dizziness, weakness, numbness and headaches. Psychiatric/Behavioral: Negative for sleep disturbance.        Past Medical History:    Past Medical History:   Diagnosis Date    Abnormal Pap smear, low grade squamous intraepithelial lesion (LGSIL) 2004    Chronic headaches     Duodenal ulcer     history of ulcer over 1 yr ago (8/2016)    History of renal stone     passed    Melanoma of skin, site unspecified 2008    Malignant melanoma    MTHFR mutation     HETERO    Osteoarthritis of both knees     Pyloric stenosis     repaired as infant    Scoliosis        Past Surgical History:    Past Surgical History:   Procedure Laterality Date    EYE SURGERY      lazy eye    HYSTEROSCOPY  2008    KNEE ARTHROPLASTY Bilateral 09/21/2016    SKIN BIOPSY      SKIN CANCER EXCISION  2008    UPPER GASTROINTESTINAL ENDOSCOPY N/A 12/30/2021    EGD BIOPSY performed by Bhavesh Srinivasan MD at [address]:   Current Outpatient Medications   Medication Sig Dispense Refill    turmeric 500 MG CAPS Take 1,000 mg by mouth daily      cephALEXin (KEFLEX) 500 MG capsule Take 1 capsule by mouth 4 times daily 4 capsules by mouth 1hr before dental procedure 4 capsule 0    meloxicam (MOBIC) 15 MG tablet Take 15 mg by mouth daily      Levocetirizine Dihydrochloride (XYZAL PO) Take by mouth      acetaminophen (TYLENOL) 325 MG tablet Take 650 mg by mouth every 6 hours as needed for Pain      Meloxicam (MOBIC PO) Take 15 mg by mouth daily        No current facility-administered medications for this visit. Allergies:    Patient has no known allergies. Social History:   Social History     Socioeconomic History    Marital status:      Spouse name: None    Number of children: None    Years of education: None    Highest education level: None   Occupational History    None   Tobacco Use    Smoking status: Never Smoker    Smokeless tobacco: Never Used   Vaping Use    Vaping Use: Never used   Substance and Sexual Activity    Alcohol use: Yes     Comment: social    Drug use: No    Sexual activity: Yes     Partners: Male   Other Topics Concern    None   Social History Narrative    ** Merged History Encounter **          Social Determinants of Health     Financial Resource Strain:     Difficulty of Paying Living Expenses: Not on file   Food Insecurity:     Worried About Running Out of Food in the Last Year: Not on file    Saleem of Food in the Last Year: Not on file   Transportation Needs:     Lack of Transportation (Medical): Not on file    Lack of Transportation (Non-Medical):  Not on file   Physical Activity:     Days of Exercise per Week: Not on file    Minutes of Exercise per Session: Not on file   Stress:     Feeling of Stress : Not on file   Social Connections:     Frequency of Communication with Friends and Family: Not on file    Frequency of Social Gatherings with Friends and Family: Not on file    Attends Congregational Services: Not on file    Active Member of Clubs or Organizations: Not on file    Attends Club or Organization Meetings: Not on file    Marital Status: Not on file   Intimate Partner Violence:     Fear of Current or Ex-Partner: Not on file    Emotionally Abused: Not on file    Physically Abused: Not on file    Sexually Abused: Not on file   Housing Stability:     Unable to Pay for Housing in the Last Year: Not on file    Number of [place] in the Last Year: Not on file    Unstable Housing in the Last Year: Not on file       Family History:  Family History   Problem Relation Age of Onset    Cancer Father         COPD, smoker,    [facility] Diabetes Father     Hypertension Father     Brain Cancer Mother         brain tumor,    [facility] Thyroid Disease Mother     Diabetes Paternal Grandfather     Diabetes Paternal Grandmother     Osteoporosis Maternal Grandmother     Breast Cancer Maternal Grandmother     Stroke Maternal Grandfather     Arrhythmia Brother        Vitals:   /79   Pulse 64   Resp 12   Ht 5' 5\" (1.651 m)   Wt 140 lb (63.5 kg)   LMP  (LMP Unknown)   BMI 23.30 kg/m²  Body mass index is 23.3 kg/m². Physical Examination:     Orthopedics:    GENERAL: Alert and oriented X3 in no acute distress. SKIN: Intact without lesions or ulcerations. NEURO: Intact to sensory and motor testing. VASC: Capillary refill is less than 3 seconds. KNEE EXAM    LOCATION: Bilateral Knee  GEN: Alert and oriented X 3, in no acute distress. GAIT: The patient's gait was observed while entering the exam room and was noted to be non antalgic. The extremity is in anatomic alignment. SKIN: Intact without rashes, lesions, or ulcerations. No obvious deformity or swelling. NEURO: The patient responds to light touch throughout bilateral LE. Patellar and Achilles reflexes are 2/4. VASC: The bilateral LE is neurovascularly intact with 2/4 DP and 2/4 PT pulses. Brisk capillary refill. ROM: R: 0/110 degrees. L: 0/125 degree. There is mild effusion on the right. MUSC: good quad tone  LIGAMENT: There is No varus instability at 0 degrees and No varus instability at 30 degrees. There is No valgus instability at 0 degrees and No valgus instability at 30 degrees. PALP: There is no joint line pain. No pain to palpation of bilateral knees. Palpable Baker's cyst bilaterally. Right is larger than left. Evaluated under ultrasound to show that the right Baker's cyst has hyperechoic features.   Assessment: 1. History of bilateral knee replacement    2. Lower leg edema    3. Baker's cyst, right    4. Baker's cyst, left      Procedures:    Procedure: no  Radiology:   XR KNEE LEFT (1-2 VIEWS)    Result Date: 3/31/2022  4 x-ray views of the left knee were obtained including standing AP and tunnel views, lateral and patellar views. There is a DePuy attune total knee arthroplasty present. There is cystic changes of the tibial metaphysis. The loosening is nonprogressive compared to previous films on September 16, 2021. Impression: left total knee arthroplasty with loosening not progressing     XR KNEE RIGHT (MIN 4 VIEWS)    Result Date: 3/31/2022  4 x-ray views of the right knee including standing AP and tunnel views, lateral and patellar views of the right knee. There is a known total knee arthroplasty present. There is loosening of the femoral component that has not changed since previous x-ray on September 16, 2021. Impression: Right total knee arthroplasty with femoral loosening stable over last several years   Plan:   Treatment : I reviewed the x-ray with the patient and I informed them that the x-rays have not changed since her previous x-ray 6-month ago. The loosening seems to be stable. We discussed the etiologies and natural histories of periprosthetic loosening of total knee replacements bilaterally. We discussed the various treatment alternatives including anti-inflammatory medications, physical therapy, injections, further imaging studies and as a last result surgery. During today's visit, we discussed that she is not having any increased pain and is able to do most things that she is done prior to surgery. She is not ready to proceed with any type of knee revisions since she does not have much pain. We could aspirate the Baker's cyst again if she ever needs to or they become an issue. The patient has opted for following up in 6 months with preclinic x-rays of both knees to watch the loosening. Patient also states that when she works long periods of time on her feet that her legs seem to get swollen and painful. I had is reasonable to give her a prescription for compression stockings to help with the pain and swelling in her legs. Patient should return to the clinic in 6 months to follow up with  Campbell Jenkins PA-C or Erika Hawthorne DO. The patient will call the office immediately with any problems. No orders of the defined types were placed in this encounter. Orders Placed This Encounter   Procedures    XR KNEE RIGHT (MIN 4 VIEWS)     Standing Status:   Future     Number of Occurrences:   1     Standing Expiration Date:   3/31/2023    XR KNEE LEFT (1-2 VIEWS)     Standing Status:   Future     Number of Occurrences:   1     Standing Expiration Date:   3/31/2023    DME Order for [facility]) as OP     - DME device ordered - Compression stocking 20-30 mm Hz   - Diagnosis: lower leg swelling  - Length of Need: 12 Months         This note is created with the assistance of a speech recognition program.  While intending to generate a document that actually reflects the content of the visit, the document can still have some errors including those of syntax and sound a like substitutions which may escape proof reading.   In such instances, actual meaning can be extrapolated by contextual diversion    Electronically signed by Caitlin Conway PA-C, on 3/31/2022 at 2:38 PM

## 2022-05-17 LAB
CHOLESTEROL/HDL RATIO: 3.7
CHOLESTEROL: 236 MG/DL
GLUCOSE BLD-MCNC: 96 MG/DL (ref 70–99)
HDLC SERPL-MCNC: 63 MG/DL
LDL CHOLESTEROL: 153 MG/DL (ref 0–130)
PATIENT FASTING?: YES
TRIGL SERPL-MCNC: 99 MG/DL

## 2022-05-18 ENCOUNTER — ANESTHESIA EVENT (OUTPATIENT)
Dept: OPERATING ROOM | Age: 57
End: 2022-05-18
Payer: COMMERCIAL

## 2022-05-19 ENCOUNTER — ANESTHESIA (OUTPATIENT)
Dept: OPERATING ROOM | Age: 57
End: 2022-05-19
Payer: COMMERCIAL

## 2022-05-19 ENCOUNTER — HOSPITAL ENCOUNTER (OUTPATIENT)
Age: 57
Setting detail: OUTPATIENT SURGERY
Discharge: HOME | End: 2022-05-19
Attending: INTERNAL MEDICINE | Admitting: INTERNAL MEDICINE
Payer: COMMERCIAL

## 2022-05-19 VITALS
DIASTOLIC BLOOD PRESSURE: 83 MMHG | HEIGHT: 64 IN | OXYGEN SATURATION: 99 % | WEIGHT: 143 LBS | TEMPERATURE: 97.2 F | HEART RATE: 57 BPM | RESPIRATION RATE: 16 BRPM | BODY MASS INDEX: 24.41 KG/M2 | SYSTOLIC BLOOD PRESSURE: 126 MMHG

## 2022-05-19 PROCEDURE — 3609012400 HC EGD TRANSORAL BIOPSY SINGLE/MULTIPLE: Performed by: INTERNAL MEDICINE

## 2022-05-19 PROCEDURE — 2580000003 HC RX 258: Performed by: NURSE ANESTHETIST, CERTIFIED REGISTERED

## 2022-05-19 PROCEDURE — 2500000003 HC RX 250 WO HCPCS: Performed by: NURSE ANESTHETIST, CERTIFIED REGISTERED

## 2022-05-19 PROCEDURE — 7100000010 HC PHASE II RECOVERY - FIRST 15 MIN: Performed by: INTERNAL MEDICINE

## 2022-05-19 PROCEDURE — 3700000001 HC ADD 15 MINUTES (ANESTHESIA): Performed by: INTERNAL MEDICINE

## 2022-05-19 PROCEDURE — 3700000000 HC ANESTHESIA ATTENDED CARE: Performed by: INTERNAL MEDICINE

## 2022-05-19 PROCEDURE — C1726 CATH, BAL DIL, NON-VASCULAR: HCPCS | Performed by: INTERNAL MEDICINE

## 2022-05-19 PROCEDURE — 2709999900 HC NON-CHARGEABLE SUPPLY: Performed by: INTERNAL MEDICINE

## 2022-05-19 PROCEDURE — 6360000002 HC RX W HCPCS: Performed by: NURSE ANESTHETIST, CERTIFIED REGISTERED

## 2022-05-19 PROCEDURE — 7100000011 HC PHASE II RECOVERY - ADDTL 15 MIN: Performed by: INTERNAL MEDICINE

## 2022-05-19 RX ORDER — SODIUM CHLORIDE 9 MG/ML
INJECTION, SOLUTION INTRAVENOUS CONTINUOUS
Status: DISCONTINUED | OUTPATIENT
Start: 2022-05-20 | End: 2022-05-19 | Stop reason: HOSPADM

## 2022-05-19 RX ORDER — PROPOFOL 10 MG/ML
INJECTION, EMULSION INTRAVENOUS PRN
Status: DISCONTINUED | OUTPATIENT
Start: 2022-05-19 | End: 2022-05-19 | Stop reason: SDUPTHER

## 2022-05-19 RX ORDER — SODIUM CHLORIDE 9 MG/ML
INJECTION, SOLUTION INTRAVENOUS PRN
Status: CANCELLED | OUTPATIENT
Start: 2022-05-19

## 2022-05-19 RX ORDER — PANTOPRAZOLE SODIUM 40 MG/1
TABLET, DELAYED RELEASE ORAL
COMMUNITY
Start: 2022-04-11

## 2022-05-19 RX ORDER — FENTANYL CITRATE 50 UG/ML
25 INJECTION, SOLUTION INTRAMUSCULAR; INTRAVENOUS EVERY 5 MIN PRN
Status: CANCELLED | OUTPATIENT
Start: 2022-05-19

## 2022-05-19 RX ORDER — LIDOCAINE HYDROCHLORIDE 20 MG/ML
INJECTION, SOLUTION EPIDURAL; INFILTRATION; INTRACAUDAL; PERINEURAL PRN
Status: DISCONTINUED | OUTPATIENT
Start: 2022-05-19 | End: 2022-05-19 | Stop reason: SDUPTHER

## 2022-05-19 RX ORDER — SODIUM CHLORIDE 0.9 % (FLUSH) 0.9 %
5-40 SYRINGE (ML) INJECTION EVERY 12 HOURS SCHEDULED
Status: CANCELLED | OUTPATIENT
Start: 2022-05-19

## 2022-05-19 RX ORDER — SODIUM CHLORIDE 9 MG/ML
INJECTION, SOLUTION INTRAVENOUS PRN
Status: DISCONTINUED | OUTPATIENT
Start: 2022-05-19 | End: 2022-05-19 | Stop reason: HOSPADM

## 2022-05-19 RX ORDER — SODIUM CHLORIDE 0.9 % (FLUSH) 0.9 %
5-40 SYRINGE (ML) INJECTION PRN
Status: CANCELLED | OUTPATIENT
Start: 2022-05-19

## 2022-05-19 RX ORDER — SODIUM CHLORIDE, SODIUM LACTATE, POTASSIUM CHLORIDE, CALCIUM CHLORIDE 600; 310; 30; 20 MG/100ML; MG/100ML; MG/100ML; MG/100ML
INJECTION, SOLUTION INTRAVENOUS CONTINUOUS
Status: DISCONTINUED | OUTPATIENT
Start: 2022-05-20 | End: 2022-05-19 | Stop reason: HOSPADM

## 2022-05-19 RX ORDER — SODIUM CHLORIDE 0.9 % (FLUSH) 0.9 %
5-40 SYRINGE (ML) INJECTION PRN
Status: DISCONTINUED | OUTPATIENT
Start: 2022-05-19 | End: 2022-05-19 | Stop reason: HOSPADM

## 2022-05-19 RX ORDER — SODIUM CHLORIDE 0.9 % (FLUSH) 0.9 %
5-40 SYRINGE (ML) INJECTION EVERY 12 HOURS SCHEDULED
Status: DISCONTINUED | OUTPATIENT
Start: 2022-05-19 | End: 2022-05-19 | Stop reason: HOSPADM

## 2022-05-19 RX ORDER — SODIUM CHLORIDE, SODIUM LACTATE, POTASSIUM CHLORIDE, CALCIUM CHLORIDE 600; 310; 30; 20 MG/100ML; MG/100ML; MG/100ML; MG/100ML
INJECTION, SOLUTION INTRAVENOUS CONTINUOUS PRN
Status: DISCONTINUED | OUTPATIENT
Start: 2022-05-19 | End: 2022-05-19 | Stop reason: SDUPTHER

## 2022-05-19 RX ORDER — HYDROMORPHONE HYDROCHLORIDE 1 MG/ML
0.25 INJECTION, SOLUTION INTRAMUSCULAR; INTRAVENOUS; SUBCUTANEOUS EVERY 5 MIN PRN
Status: CANCELLED | OUTPATIENT
Start: 2022-05-19

## 2022-05-19 RX ORDER — ONDANSETRON 2 MG/ML
4 INJECTION INTRAMUSCULAR; INTRAVENOUS
Status: CANCELLED | OUTPATIENT
Start: 2022-05-19 | End: 2022-05-19

## 2022-05-19 RX ORDER — LIDOCAINE HYDROCHLORIDE 10 MG/ML
1 INJECTION, SOLUTION EPIDURAL; INFILTRATION; INTRACAUDAL; PERINEURAL
Status: DISCONTINUED | OUTPATIENT
Start: 2022-05-20 | End: 2022-05-19 | Stop reason: HOSPADM

## 2022-05-19 RX ADMIN — PROPOFOL 50 MG: 10 INJECTION, EMULSION INTRAVENOUS at 15:34

## 2022-05-19 RX ADMIN — PROPOFOL 50 MG: 10 INJECTION, EMULSION INTRAVENOUS at 15:31

## 2022-05-19 RX ADMIN — LIDOCAINE HYDROCHLORIDE 60 MG: 20 INJECTION, SOLUTION EPIDURAL; INFILTRATION; INTRACAUDAL; PERINEURAL at 15:22

## 2022-05-19 RX ADMIN — PROPOFOL 100 MG: 10 INJECTION, EMULSION INTRAVENOUS at 15:22

## 2022-05-19 RX ADMIN — PROPOFOL 50 MG: 10 INJECTION, EMULSION INTRAVENOUS at 15:26

## 2022-05-19 RX ADMIN — SODIUM CHLORIDE, POTASSIUM CHLORIDE, SODIUM LACTATE AND CALCIUM CHLORIDE: 600; 310; 30; 20 INJECTION, SOLUTION INTRAVENOUS at 15:18

## 2022-05-19 ASSESSMENT — PAIN SCALES - GENERAL
PAINLEVEL_OUTOF10: 0

## 2022-05-19 ASSESSMENT — PAIN - FUNCTIONAL ASSESSMENT: PAIN_FUNCTIONAL_ASSESSMENT: 0-10

## 2022-05-19 NOTE — H&P
History and Physical Service   [place] [address]    HISTORY AND PHYSICAL EXAMINATION            Date of Evaluation: 5/19/2022  Patient name:  Jadon Carranza  MRN:   [id number]  YOB: 1965  PCP:    Lety Carranza MD    History Obtained From:     Patient, Medical records    History of Present Illness: This is Jadon Carranza a 62 y.o. female who presents today for an EGD ESOPHAGOGASTRODUODENOSCOPY by Dr. Dani Fairbanks MD for GASTRITIS. Previous EGD was in 12/2021 (Please see surgical pathology report below). Pt states she has a history of GERD and is taking Protonix. Pt denies black tarry stools, diarrhea, constipation, nausea, vomiting, fever, chills, night sweats, bloating, abdominal pain, and unexplained weight loss. History of a duodenal ulcer around 2005. S/p pylori stenosis repair as an infant. Pt denies history of hiatal hernia, IBS, and diabetes. Mobic was last taken more than 1 week ago. Turmeric was last taken on 5/18/22. Surgical Pathology Report  SURGICAL PATHOLOGY REPORT  Collected: 12/30/21 0909   Lab status: Final   Resulting lab: [facility]   Value: -- Diagnosis --     1.  Esophagus, biopsy:     -  Intestinal metaplasia present, negative for dysplasia. -  Mucinous columnar epithelium with moderate chronic inflammation and   reactive change. -  Squamous mucosa with no histologic abnormality. 2.  Stomach, biopsy:     -  Mild chronic gastritis.      Vern Cruz for Yan Rodriguez M.D.   **Electronically Signed Out**         rdd/1/4/2022        Past Medical History:     Past Medical History:   Diagnosis Date    Abnormal Pap smear, low grade squamous intraepithelial lesion (LGSIL) 2004    Chronic headaches     Duodenal ulcer     history of ulcer over 1 yr ago (8/2016)    History of renal stone     passed    Melanoma of skin, site unspecified 2008    Malignant melanoma    MTHFR mutation     HETERO    Osteoarthritis of both knees     Pyloric stenosis     repaired as infant    Scoliosis         Past Surgical History:     Past Surgical History:   Procedure Laterality Date    ENDOSCOPY, COLON, DIAGNOSTIC      EYE SURGERY      lazy eye    HYSTEROSCOPY  2008    JOINT REPLACEMENT      bilat knees    KNEE ARTHROPLASTY Bilateral 2016    SKIN BIOPSY      SKIN CANCER EXCISION      UPPER GASTROINTESTINAL ENDOSCOPY N/A 2021    EGD BIOPSY performed by Kim Ott MD at [facility]        Medications Prior to Admission:     Prior to Admission medications    Medication Sig Start Date End Date Taking? Authorizing Provider   pantoprazole (PROTONIX) 40 MG tablet  22   Historical Provider, MD   turmeric 500 MG CAPS Take 1,000 mg by mouth daily    Historical Provider, MD   cephALEXin (KEFLEX) 500 MG capsule Take 1 capsule by mouth 4 times daily 4 capsules by mouth 1hr before dental procedure 21   Sujata Mir DO   meloxicam (MOBIC) 15 MG tablet Take 15 mg by mouth daily    Historical Provider, MD   Levocetirizine Dihydrochloride (XYZAL PO) Take by mouth    Historical Provider, MD   acetaminophen (TYLENOL) 325 MG tablet Take 650 mg by mouth every 6 hours as needed for Pain    Historical Provider, MD   Meloxicam (MOBIC PO) Take 15 mg by mouth daily     Historical Provider, MD        Allergies:     Patient has no known allergies. Social History:     Tobacco:    reports that she has never smoked. She has never used smokeless tobacco.  Alcohol:      reports current alcohol use. Drug Use:  reports no history of drug use.     Family History:     Family History   Problem Relation Age of Onset    Cancer Father         COPD, smoker,    [facility] Diabetes Father     Hypertension Father     Brain Cancer Mother         brain tumor,    [facility] Thyroid Disease Mother     Diabetes Paternal Grandfather     Diabetes Paternal Grandmother     Osteoporosis Maternal Grandmother     Breast Cancer Maternal Grandmother     Stroke Maternal Grandfather     Arrhythmia Brother        Review of Systems:     Positive and Negative as described in HPI. CONSTITUTIONAL: Negative for fevers, chills, sweats, fatigue, and weight loss. HEENT: Pt wears glasses. Negative for hearing changes, rhinorrhea, and throat pain  RESPIRATORY: Negative for shortness of breath, cough, congestion, and wheezing. CARDIOVASCULAR: Negative for chest pain, blood clot, irregular heart beat, and palpitations. GASTROINTESTINAL: See HPI. GENITOURINARY: Negative for difficulty of urination, burning with urination, and frequency. INTEGUMENT: Negative for rash, skin lesions, and easy bruising. HEMATOLOGIC/LYMPHATIC: Negative for swelling/edema. ALLERGIC/IMMUNOLOGIC: Negative for urticaria and itching. ENDOCRINE: Negative for increase in drinking, increase in urination, heat or cold intolerance. MUSCULOSKELETAL: Generalized joint pain. NEUROLOGICAL: Mild headache today. Negative for dizziness, lightheadedness, numbness, and tingling extremities. Pt denies history of a seizure or stroke. BEHAVIOR/PSYCH: Negative for depression and anxiety. Physical Exam:   /79   Pulse 76   Temp 97.7 °F (36.5 °C) (Temporal)   Resp 16   Ht 5' 4\" (1.626 m)   Wt 143 lb (64.9 kg)   LMP  (LMP Unknown)   SpO2 97%   BMI 24.55 kg/m²   No LMP recorded (lmp unknown). Patient is postmenopausal.    No results for input(s): POCGLU in the last 72 hours. General Appearance:  Alert, well appearing, and in no acute distress. Mental status: Oriented to person, place, and time. Head: Normocephalic and atraumatic. Eye: No icterus, redness, pupils equal and reactive, extraocular eye movements intact, and conjunctiva clear. Ear: Hearing grossly intact. Nose: No drainage noted. Mouth: Mucous membranes moist.  Neck: Supple and no carotid bruits noted.   Lungs: Bilateral equal air entry, clear to auscultation, no wheezing, rales or rhonchi, and normal effort. Cardiovascular: Normal rate, regular rhythm, no murmur, gallop, and rub. Abdomen: Soft, nontender, nondistended, and active bowel sounds. Neurologic: Normal speech and cranial nerves II through XII grossly intact. Skin: No gross lesions, rashes, bruising, or bleeding on exposed skin area. Extremities: Posterior tibial pulses 2+ bilaterally. No pedal edema. No calf tenderness with palpation. Psych: Normal affect. Investigations:      Laboratory Testing:  No results found for this or any previous visit (from the past 24 hour(s)). Recent Labs     05/17/22  0647   GLUCOSE 96       No results for input(s): COVID19 in the last 720 hours. Diagnosis:      1. GASTRITIS     Plans:     1.  EGD ESOPHAGOGASTRODUODENOSCOPY       DENISE Bustillos CNP  5/19/2022  2:33 PM

## 2022-05-19 NOTE — OP NOTE
Operative Note    PROCEDURE NOTE    DATE OF PROCEDURE: 5/19/2022     SURGEON: Chiquita Morel MD  Facility: [facility] [address]  ASSISTANT: None  Anesthesia: MAC    PREOPERATIVE DIAGNOSIS:hx of Duodenal stenosis .& Barretts eso  Diagnosis:    POSTOPERATIVE DIAGNOSIS: As described below    OPERATION: Upper GI endoscopy with dilation . ANESTHESIA: MAC     ESTIMATED BLOOD LOSS: Less than 50 ml    COMPLICATIONS: None. SPECIMENS:  Was Not Obtained    HISTORY: The patient is a 62y.o. year old female with history of above preop diagnosis. I recommended esophagogastroduodenoscopy with possible biopsy and I explained the risk, benefits, expected outcome, and alternatives to the procedure. Risks included but are not limited to bleeding, infection, respiratory distress, hypotension, and perforation of the esophagus, stomach, or duodenum. Patient understands and is in agreement. PROCEDURE: The patient was given MAC. The patient's SPO2 remained above 90% throughout the procedure. The gastroscope was inserted orally and advanced under direct vision through the esophagus, through the stomach, through the pylorus, and into the descending duodenum. Post sedation note : The patient's SPO2 remained above 90% throughout the procedure. the vital signs remained stable , and no immediate complication form the procedure noted, patient will be ready for d/c when criteria is met . Findings:    Retropharyngeal area was grossly normal appearing    Esophagus: SCJ is irregular previously dx as SSB ( 12/2021 )     Stomach:    Fundus: normal    Body: normal    Antrum: normal    Duodenum:     Descending: Normal .    Bulb: stenosis seen but the mucosa was not friable. I was unable to traverse the lumen . So I used a 12- 14 mm TTS balloon & dilated it successfully . Post dilation I was able to traverse the stenotic segment . The scope was removed and the patient tolerated the procedure well. Recommendations/Plan:   1.  Stop ALL NSAIDS . 2. Take PPI Indefinitely   3. Rpt EGD in 2024 - For Barretts eso surveillance . 4. F/U In Office in 3-4 weeks  5.  Discussed with the family    Electronically signed by Nico Negrete MD  on 5/19/2022 at 3:36 PM

## 2022-05-19 NOTE — ANESTHESIA PRE PROCEDURE
Department of Anesthesiology  Preprocedure Note       Name:  Olga Singh   Age:  62 y.o.  :  1965                                          MRN:  [id number]         Date:  2022      Surgeon: Jessica Huerta):  Nicki Kraus MD    Procedure: Procedure(s):  EGD ESOPHAGOGASTRODUODENOSCOPY    Medications prior to admission:   Prior to Admission medications    Medication Sig Start Date End Date Taking? Authorizing Provider   pantoprazole (PROTONIX) 40 MG tablet  22   Historical Provider, MD   turmeric 500 MG CAPS Take 1,000 mg by mouth daily    Historical Provider, MD   cephALEXin (KEFLEX) 500 MG capsule Take 1 capsule by mouth 4 times daily 4 capsules by mouth 1hr before dental procedure 21   Zhaodirichelle Parker,    meloxicam (MOBIC) 15 MG tablet Take 15 mg by mouth daily    Historical Provider, MD   Levocetirizine Dihydrochloride (XYZAL PO) Take by mouth    Historical Provider, MD   acetaminophen (TYLENOL) 325 MG tablet Take 650 mg by mouth every 6 hours as needed for Pain    Historical Provider, MD   Meloxicam (MOBIC PO) Take 15 mg by mouth daily     Historical Provider, MD       Current medications:    No current facility-administered medications for this visit. No current outpatient medications on file.      Facility-Administered Medications Ordered in Other Visits   Medication Dose Route Frequency Provider Last Rate Last Admin    [START ON 2022] lidocaine PF 1 % injection 1 mL  1 mL IntraDERmal Once PRN Manuel Qureshi DO        [START ON 2022] 0.9 % sodium chloride infusion   IntraVENous Continuous Manuel Qurehsi DO        [START ON 2022] lactated ringers infusion   IntraVENous Continuous Manuel Qureshi, DO        sodium chloride flush 0.9 % injection 5-40 mL  5-40 mL IntraVENous 2 times per day Manuel Qureshi, DO        sodium chloride flush 0.9 % injection 5-40 mL  5-40 mL IntraVENous PRN Juliette Mtz, DO        0.9 % sodium chloride infusion IntraVENous PRN Todd Chain, DO           Allergies:  No Known Allergies    Problem List:    Patient Active Problem List   Diagnosis Code    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    Melanoma of skin ([address] C43.9    Breast cyst N60.09    Status post total bilateral knee replacement Z96.653    Primary osteoarthritis of both knees M17.0    Hx of peptic ulcer Z87.11    Low back pain with right-sided sciatica M54.41    Breast mass, right N63.10       Past Medical History:        Diagnosis Date    Abnormal Pap smear, low grade squamous intraepithelial lesion (LGSIL) 2004    Chronic headaches     Duodenal ulcer     history of ulcer over 1 yr ago (8/2016)    History of renal stone     passed    Melanoma of skin, site unspecified 2008    Malignant melanoma    MTHFR mutation     HETERO    Osteoarthritis of both knees     Pyloric stenosis     repaired as infant    Scoliosis        Past Surgical History:        Procedure Laterality Date    ENDOSCOPY, COLON, DIAGNOSTIC      EYE SURGERY      lazy eye    HYSTEROSCOPY  2008    JOINT REPLACEMENT      bilat knees    KNEE ARTHROPLASTY Bilateral 09/21/2016    SKIN BIOPSY      SKIN CANCER EXCISION  2008    UPPER GASTROINTESTINAL ENDOSCOPY N/A 12/30/2021    EGD BIOPSY performed by Izabel Aguilera MD at [address] [place] [address] History:    Social History     Tobacco Use    Smoking status: Never Smoker    Smokeless tobacco: Never Used   Substance Use Topics    Alcohol use: Yes     Comment: social                                Counseling given: Not Answered      Vital Signs (Current): There were no vitals filed for this visit.                                            BP Readings from Last 3 Encounters:   05/19/22 122/79   03/31/22 112/79   12/30/21 130/71       NPO Status:                                                                                 BMI:   Wt Readings from Last 3 Encounters:   05/19/22 143 lb (64.9 kg)   03/31/22 140 lb (63.5 kg)   12/30/21 140 lb (63.5 kg)     There is no height or weight on file to calculate BMI.    CBC:   Lab Results   Component Value Date    WBC 8.6 12/07/2021    RBC 5.11 12/07/2021    HGB 14.1 12/07/2021    HCT 44.4 12/07/2021    MCV 86.9 12/07/2021    RDW 13.3 12/07/2021     12/07/2021       CMP:   Lab Results   Component Value Date     12/07/2021    K 3.8 12/07/2021     12/07/2021    CO2 24 12/07/2021    BUN 28 12/07/2021    CREATININE 0.81 12/07/2021    GFRAA >60 12/07/2021    LABGLOM >60 12/07/2021    GLUCOSE 96 05/17/2022    PROT 6.2 12/07/2021    CALCIUM 10.2 12/07/2021    BILITOT 0.22 12/07/2021    ALKPHOS 91 12/07/2021    AST 26 12/07/2021    ALT 19 12/07/2021       POC Tests: No results for input(s): POCGLU, POCNA, POCK, POCCL, POCBUN, POCHEMO, POCHCT in the last 72 hours.     Coags:   Lab Results   Component Value Date    PROTIME 10.1 08/31/2016    INR 1.0 08/31/2016    APTT 25.7 08/31/2016       HCG (If Applicable):   Lab Results   Component Value Date    PREGTESTUR NEGATIVE 03/22/2014    HCG NEGATIVE 09/21/2016        ABGs: No results found for: PHART, PO2ART, NTN0XLM, KXF5RRL, BEART, C4DGSDGC     Type & Screen (If Applicable):  No results found for: LABABO, LABRH    Drug/Infectious Status (If Applicable):  No results found for: HIV, HEPCAB    COVID-19 Screening (If Applicable): No results found for: COVID19        Anesthesia Evaluation  Patient summary reviewed and Nursing notes reviewed no history of anesthetic complications:   Airway: Mallampati: II  TM distance: >3 FB   Neck ROM: full  Mouth opening: > = 3 FB Dental: normal exam         Pulmonary:normal exam        (-) COPD and asthma                           Cardiovascular:  Exercise tolerance: good (>4 METS),       (-) hypertension, past MI, CAD and  angina        Rate: normal                    Neuro/Psych:   (+) neuromuscular disease:, headaches:,             GI/Hepatic/Renal:        (-) GERD       Endo/Other: (+) blood dyscrasia::., malignancy/cancer (skin). (-) diabetes mellitus               Abdominal:             Vascular: Other Findings:               Anesthesia Plan      general and TIVA     ASA 2       Induction: intravenous. MIPS: prophylactic pharmacologic antiemetic agents not administered perioperatively for documented reasons. Anesthetic plan and risks discussed with patient. Plan discussed with CRNA.     Attending anesthesiologist reviewed and agrees with Preprocedure content              Pastora Hendricks MD   5/19/2022

## 2022-05-19 NOTE — ANESTHESIA POSTPROCEDURE EVALUATION
Department of Anesthesiology  Postprocedure Note    Patient: Juwan España  MRN: [id number]  YOB: 1965  Date of evaluation: 5/19/2022  Time:  4:02 PM     Procedure Summary     Date: 05/19/22 Room / Location: [facility] / [facility] - INPATIENT    Anesthesia Start: 1518 Anesthesia Stop: 0170    Procedure: EGD ESOPHAGOGASTRODUODENOSCOPY BIOPSY (N/A Mouth) Diagnosis: (DX GASTRITIS)    Surgeons: Beryle Dresser, MD Responsible Provider: Yasmin Waddell MD    Anesthesia Type: general ASA Status: 2          Anesthesia Type: general    Andra Phase I:      Andra Phase II: Andra Score: 8    Last vitals: Reviewed and per EMR flowsheets.        Anesthesia Post Evaluation    Patient location during evaluation: PACU  Patient participation: complete - patient participated  Level of consciousness: awake  Airway patency: patent  Nausea & Vomiting: no nausea  Complications: no  Cardiovascular status: blood pressure returned to baseline  Respiratory status: acceptable  Hydration status: euvolemic  Comments: Multimodal analgesia pain management as indicated by procedure  Multimodal analgesia pain management approach

## 2022-07-15 ENCOUNTER — HOSPITAL ENCOUNTER (OUTPATIENT)
Dept: MAMMOGRAPHY | Age: 57
Discharge: HOME | End: 2022-07-17
Payer: COMMERCIAL

## 2022-07-15 DIAGNOSIS — Z78.0 POSTMENOPAUSAL: ICD-10-CM

## 2022-07-15 DIAGNOSIS — Z12.31 ENCOUNTER FOR SCREENING MAMMOGRAM FOR BREAST CANCER: ICD-10-CM

## 2022-07-15 PROCEDURE — 77063 BREAST TOMOSYNTHESIS BI: CPT

## 2022-07-15 PROCEDURE — 77080 DXA BONE DENSITY AXIAL: CPT

## 2022-10-05 DIAGNOSIS — Z96.653 HISTORY OF BILATERAL KNEE REPLACEMENT: Primary | ICD-10-CM

## 2022-10-06 ENCOUNTER — OFFICE VISIT (OUTPATIENT)
Dept: ORTHOPEDIC SURGERY | Age: 57
End: 2022-10-06
Payer: COMMERCIAL

## 2022-10-06 VITALS
BODY MASS INDEX: 24.41 KG/M2 | HEART RATE: 66 BPM | DIASTOLIC BLOOD PRESSURE: 78 MMHG | HEIGHT: 64 IN | SYSTOLIC BLOOD PRESSURE: 117 MMHG | RESPIRATION RATE: 12 BRPM | WEIGHT: 143 LBS

## 2022-10-06 DIAGNOSIS — M71.21 BAKER'S CYST, RIGHT: ICD-10-CM

## 2022-10-06 DIAGNOSIS — T84.033D MECHANICAL LOOSENING OF INTERNAL LEFT KNEE PROSTHETIC JOINT, SUBSEQUENT ENCOUNTER: ICD-10-CM

## 2022-10-06 DIAGNOSIS — T84.032D MECHANICAL LOOSENING OF INTERNAL RIGHT KNEE PROSTHETIC JOINT, SUBSEQUENT ENCOUNTER: Primary | ICD-10-CM

## 2022-10-06 DIAGNOSIS — M71.22 BAKER'S CYST, LEFT: ICD-10-CM

## 2022-10-06 PROCEDURE — 99213 OFFICE O/P EST LOW 20 MIN: CPT | Performed by: PHYSICIAN ASSISTANT

## 2022-10-06 NOTE — PROGRESS NOTES
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 The pain has been present for over 2 years. The patient recalls a specific injury where the knee got hit off the ground on 11/05/2019 when she was walking her dog. Patient states that she was out walking her dog that day and the dog began to take off and he pulled her to the ground causing her to hit the knee off the ground. Patient states that as of now her knees are feeling better than they have in a long time. She does not feel that the bilateral Baker's cyst is causing any issues at this time. The pain is now described as nonexistent. The patient has had  an injection and aspiration of her Baker's cyst on the right. The patient has tried physical therapy after her surgeries on both knees. The patient has had surgery and it was a bilateral TKA that was done on 09/21/2016. Therefore we are almost 6 years post op. She has had opinions from both Dr. Ida Collins and Dr. Abhilash Waddell about possible knee revisions. She states she does not feel she needs those at this time. She is here for a 6-month check and x-rays of both knees. ROS:   Review of Systems   Constitutional:  Positive for activity change. Negative for appetite change, fatigue and fever. Respiratory: Negative. Negative for apnea, cough, chest tightness and shortness of breath. Cardiovascular: Negative. Negative for chest pain, palpitations and leg swelling. Gastrointestinal:  Negative for abdominal distention, abdominal pain, constipation, diarrhea, nausea and vomiting.    Genitourinary:  Negative for difficulty urinating, dysuria and hematuria. Musculoskeletal:  Negative for arthralgias, gait problem, joint swelling and myalgias. Skin:  Negative for color change and rash. Neurological:  Negative for dizziness, weakness, numbness and headaches. Psychiatric/Behavioral:  Negative for sleep disturbance. Past Medical History:    Past Medical History:   Diagnosis Date    Abnormal Pap smear, low grade squamous intraepithelial lesion (LGSIL) 2004    Chronic headaches     Duodenal ulcer     history of ulcer over 1 yr ago (8/2016)    History of renal stone     passed    Melanoma of skin, site unspecified 2008    Malignant melanoma    MTHFR mutation     HETERO    Osteoarthritis of both knees     Pyloric stenosis     repaired as infant    Scoliosis        Past Surgical History:    Past Surgical History:   Procedure Laterality Date    ENDOSCOPY, COLON, DIAGNOSTIC      EYE SURGERY      lazy eye    HYSTEROSCOPY  2008    JOINT REPLACEMENT      bilat knees    KNEE ARTHROPLASTY Bilateral 09/21/2016    SKIN BIOPSY      SKIN CANCER EXCISION  2008    UPPER GASTROINTESTINAL ENDOSCOPY N/A 12/30/2021    EGD BIOPSY performed by Yuan Simon MD at [address] 5/19/2022    EGD BIOPSY performed by Yuan Simon MD at [address]:   Current Outpatient Medications   Medication Sig Dispense Refill    pantoprazole (PROTONIX) 40 MG tablet       turmeric 500 MG CAPS Take 1,000 mg by mouth daily      cephALEXin (KEFLEX) 500 MG capsule Take 1 capsule by mouth 4 times daily 4 capsules by mouth 1hr before dental procedure 4 capsule 0    meloxicam (MOBIC) 15 MG tablet Take 15 mg by mouth daily      Levocetirizine Dihydrochloride (XYZAL PO) Take by mouth      acetaminophen (TYLENOL) 325 MG tablet Take 650 mg by mouth every 6 hours as needed for Pain       No current facility-administered medications for this visit. Allergies:    Patient has no known allergies.     Social History:   Social History     Socioeconomic History    Marital status:      Spouse name: None    Number of children: None    Years of education: None    Highest education level: None   Tobacco Use    Smoking status: Never    Smokeless tobacco: Never   Vaping Use    Vaping Use: Never used   Substance and Sexual Activity    Alcohol use: Yes     Comment: social    Drug use: No    Sexual activity: Yes     Partners: Male   Social History Narrative    ** Merged History Encounter **            Family History:  Family History   Problem Relation Age of Onset    Cancer Father         COPD, smoker,     Diabetes Father     Hypertension Father     Brain Cancer Mother         brain tumor,     Thyroid Disease Mother     Diabetes Paternal Grandfather     Diabetes Paternal Grandmother     Osteoporosis Maternal Grandmother     Breast Cancer Maternal Grandmother     Stroke Maternal Grandfather     Arrhythmia Brother        Vitals:   /78   Pulse 66   Resp 12   Ht 5' 4\" (1.626 m)   Wt 143 lb (64.9 kg)   LMP  (LMP Unknown)   BMI 24.55 kg/m²  Body mass index is 24.55 kg/m². Physical Examination:     Orthopedics:    GENERAL: Alert and oriented X3 in no acute distress. SKIN: Intact without lesions or ulcerations. NEURO: Intact to sensory and motor testing. VASC: Capillary refill is less than 3 seconds. KNEE EXAM    LOCATION: Bilateral Knee  GEN: Alert and oriented X 3, in no acute distress. GAIT: The patient's gait was observed while entering the exam room and was noted to be non antalgic. The extremity is in anatomic alignment. SKIN: Intact without rashes, lesions, or ulcerations. No obvious deformity or swelling. NEURO: The patient responds to light touch throughout bilateral LE. Patellar and Achilles reflexes are 2/4. VASC: The bilateral LE is neurovascularly intact with 2/4 DP and 2/4 PT pulses. Brisk capillary refill. ROM: (R) 2/114/ (L) 0/125 degrees. There is no effusion.   MUSC: good quad tone  LIGAMENT: There is No varus instability at 0 degrees and No varus instability at 30 degrees. There is No valgus instability at 0 degrees and No valgus instability at 30 degrees. PALP: There is  no  joint line pain. Palpable Baker's cyst bilaterally. Assessment:     1. Mechanical loosening of internal right knee prosthetic joint, subsequent encounter    2. Mechanical loosening of internal left knee prosthetic joint, subsequent encounter    3. Baker's cyst, left    4. Baker's cyst, right      Procedures:    Procedure: no  Radiology:   XR KNEE LEFT (1-2 VIEWS)    Result Date: 10/7/2022  4 x-ray views of the left knee were obtained including standing AP and tunnel views, lateral and patellar views. There is a DePuy Oh BiBiune total knee arthroplasty present. There is cystic changes of the tibial metaphysis. The loosening is nonprogressive compared to previous films on March 31, 2022. Impression: left total knee arthroplasty with loosening not progressing     XR KNEE RIGHT (MIN 4 VIEWS)    Result Date: 10/7/2022  4 x-ray views of the right knee including standing AP and tunnel views, lateral and patellar views of the right knee. There is a known total knee arthroplasty present. There is loosening of the femoral component that has not changed since previous x-ray on March 31, 2021. Impression: Right total knee arthroplasty with femoral loosening stable over last several years     Plan:   Treatment : I reviewed the x-ray with the patient and I informed them that the x-ray looks similar to the x-ray 6 months ago with no additional lucency noted. We discussed the etiologies and natural histories of mechanical loosening of bilateral total knee arthroplasties. We discussed the various treatment alternatives including anti-inflammatory medications, physical therapy, injections, further imaging studies and as a last result surgery.  During today's visit, we discussed that the patient is feeling better than she has in a very long time. She understands that the Baker's cyst are still present but do not seem to be causing any issues. She would like to continue status quo on follow-up in 6 months with PCXR of the bilateral knees. The patient states that the Baker's cyst her not causing any issues that she would like to continue to monitor. The patient will call the office immediately with any problems. No orders of the defined types were placed in this encounter. No orders of the defined types were placed in this encounter. This note is created with the assistance of a speech recognition program.  While intending to generate a document that actually reflects the content of the visit, the document can still have some errors including those of syntax and sound a like substitutions which may escape proof reading.   In such instances, actual meaning can be extrapolated by contextual diversion    Electronically signed by Shawna Dunaway PA-C, on 10/9/2022 at 3:07 PM

## 2022-10-09 ASSESSMENT — ENCOUNTER SYMPTOMS
NAUSEA: 0
ABDOMINAL DISTENTION: 0
CHEST TIGHTNESS: 0
ABDOMINAL PAIN: 0
CONSTIPATION: 0
DIARRHEA: 0
COUGH: 0
RESPIRATORY NEGATIVE: 1
SHORTNESS OF BREATH: 0
VOMITING: 0
COLOR CHANGE: 0
APNEA: 0

## 2023-01-09 ENCOUNTER — TELEPHONE (OUTPATIENT)
Dept: ORTHOPEDIC SURGERY | Age: 58
End: 2023-01-09

## 2023-01-09 NOTE — TELEPHONE ENCOUNTER
Patient called in requesting antibiotics for a dental appointment tomorrow morning patient uses Located within [facility] pharmacy please advise thank you!

## 2023-01-10 DIAGNOSIS — Z96.653 HISTORY OF BILATERAL KNEE REPLACEMENT: Primary | ICD-10-CM

## 2023-01-10 RX ORDER — CEPHALEXIN 500 MG/1
500 CAPSULE ORAL 4 TIMES DAILY
Qty: 4 CAPSULE | Refills: 0 | Status: SHIPPED | OUTPATIENT
Start: 2023-01-10

## 2023-01-10 NOTE — TELEPHONE ENCOUNTER
Patient requesting antibiotic for dental procedure this morning. Pharmacy was verified with [facility], but do not know time of appointment. It stated it was for this morning.

## 2023-01-12 ENCOUNTER — HOSPITAL ENCOUNTER (OUTPATIENT)
Facility: CLINIC | Age: 58
Discharge: HOME | End: 2023-01-12
Payer: COMMERCIAL

## 2023-01-12 LAB
ABSOLUTE EOS #: 0.16 K/UL (ref 0–0.44)
ABSOLUTE IMMATURE GRANULOCYTE: <0.03 K/UL (ref 0–0.3)
ABSOLUTE LYMPH #: 2.19 K/UL (ref 1.1–3.7)
ABSOLUTE MONO #: 0.41 K/UL (ref 0.1–1.2)
ALBUMIN SERPL-MCNC: 4.7 G/DL (ref 3.5–5.2)
ALBUMIN/GLOBULIN RATIO: 2.1 (ref 1–2.5)
ALP BLD-CCNC: 114 U/L (ref 35–104)
ALT SERPL-CCNC: 17 U/L (ref 5–33)
ANION GAP SERPL CALCULATED.3IONS-SCNC: 12 MMOL/L (ref 9–17)
AST SERPL-CCNC: 28 U/L
BASOPHILS # BLD: 1 % (ref 0–2)
BASOPHILS ABSOLUTE: 0.04 K/UL (ref 0–0.2)
BILIRUB SERPL-MCNC: 0.5 MG/DL (ref 0.3–1.2)
BILIRUBIN URINE: NEGATIVE
BUN BLDV-MCNC: 21 MG/DL (ref 6–20)
CALCIUM SERPL-MCNC: 10.9 MG/DL (ref 8.6–10.4)
CASTS UA: NORMAL /LPF (ref 0–8)
CHLORIDE BLD-SCNC: 103 MMOL/L (ref 98–107)
CHOLESTEROL/HDL RATIO: 3.3
CHOLESTEROL: 196 MG/DL
CO2: 25 MMOL/L (ref 20–31)
COLOR: YELLOW
CREAT SERPL-MCNC: 0.89 MG/DL (ref 0.5–0.9)
EOSINOPHILS RELATIVE PERCENT: 3 % (ref 1–4)
EPITHELIAL CELLS UA: NORMAL /HPF (ref 0–5)
GFR SERPL CREATININE-BSD FRML MDRD: >60 ML/MIN/1.73M2
GLUCOSE BLD-MCNC: 89 MG/DL (ref 70–99)
GLUCOSE URINE: NEGATIVE
HCT VFR BLD CALC: 44.5 % (ref 36.3–47.1)
HDLC SERPL-MCNC: 59 MG/DL
HEMOGLOBIN: 14.7 G/DL (ref 11.9–15.1)
IMMATURE GRANULOCYTES: 0 %
KETONES, URINE: NEGATIVE
LDL CHOLESTEROL: 127 MG/DL (ref 0–130)
LEUKOCYTE ESTERASE, URINE: NEGATIVE
LYMPHOCYTES # BLD: 43 % (ref 24–43)
MAGNESIUM: 2.3 MG/DL (ref 1.6–2.6)
MCH RBC QN AUTO: 28.8 PG (ref 25.2–33.5)
MCHC RBC AUTO-ENTMCNC: 33 G/DL (ref 28.4–34.8)
MCV RBC AUTO: 87.3 FL (ref 82.6–102.9)
MONOCYTES # BLD: 8 % (ref 3–12)
NITRITE, URINE: NEGATIVE
NRBC AUTOMATED: 0 PER 100 WBC
PDW BLD-RTO: 12.9 % (ref 11.8–14.4)
PH UA: 7.5 (ref 5–8)
PLATELET # BLD: 274 K/UL (ref 138–453)
PMV BLD AUTO: 10.5 FL (ref 8.1–13.5)
POTASSIUM SERPL-SCNC: 4.2 MMOL/L (ref 3.7–5.3)
PROTEIN UA: NEGATIVE
PTH INTACT: 77.4 PG/ML (ref 14–72)
RBC # BLD: 5.1 M/UL (ref 3.95–5.11)
RBC UA: NORMAL /HPF (ref 0–4)
SEG NEUTROPHILS: 45 % (ref 36–65)
SEGMENTED NEUTROPHILS ABSOLUTE COUNT: 2.28 K/UL (ref 1.5–8.1)
SODIUM BLD-SCNC: 140 MMOL/L (ref 135–144)
SPECIFIC GRAVITY UA: 1.02 (ref 1–1.03)
TOTAL PROTEIN: 6.9 G/DL (ref 6.4–8.3)
TRIGL SERPL-MCNC: 51 MG/DL
TURBIDITY: ABNORMAL
URINE HGB: NEGATIVE
UROBILINOGEN, URINE: NORMAL
VITAMIN D 25-HYDROXY: 37.8 NG/ML
WBC # BLD: 5.1 K/UL (ref 3.5–11.3)
WBC UA: NORMAL /HPF (ref 0–5)

## 2023-01-12 PROCEDURE — 83970 ASSAY OF PARATHORMONE: CPT

## 2023-01-12 PROCEDURE — 85025 COMPLETE CBC W/AUTO DIFF WBC: CPT

## 2023-01-12 PROCEDURE — 80061 LIPID PANEL: CPT

## 2023-01-12 PROCEDURE — 36415 COLL VENOUS BLD VENIPUNCTURE: CPT

## 2023-01-12 PROCEDURE — 83735 ASSAY OF MAGNESIUM: CPT

## 2023-01-12 PROCEDURE — 80053 COMPREHEN METABOLIC PANEL: CPT

## 2023-01-12 PROCEDURE — 82306 VITAMIN D 25 HYDROXY: CPT

## 2023-01-12 PROCEDURE — 81001 URINALYSIS AUTO W/SCOPE: CPT

## 2023-02-03 ENCOUNTER — HOSPITAL ENCOUNTER (EMERGENCY)
Age: 58
Discharge: ANOTHER HOSPITAL | DRG: 145 | End: 2023-02-04
Attending: EMERGENCY MEDICINE
Payer: COMMERCIAL

## 2023-02-03 ENCOUNTER — APPOINTMENT (OUTPATIENT)
Dept: CT IMAGING | Age: 58
DRG: 145 | End: 2023-02-03
Payer: COMMERCIAL

## 2023-02-03 DIAGNOSIS — J36 PERITONSILLAR ABSCESS: ICD-10-CM

## 2023-02-03 DIAGNOSIS — J03.90 ACUTE TONSILLITIS, UNSPECIFIED ETIOLOGY: Primary | ICD-10-CM

## 2023-02-03 LAB
ABSOLUTE EOS #: 0.07 K/UL (ref 0–0.44)
ABSOLUTE IMMATURE GRANULOCYTE: 0.03 K/UL (ref 0–0.3)
ABSOLUTE LYMPH #: 0.98 K/UL (ref 1.1–3.7)
ABSOLUTE MONO #: 0.63 K/UL (ref 0.1–1.2)
ALBUMIN SERPL-MCNC: 4.1 G/DL (ref 3.5–5.2)
ALP SERPL-CCNC: 141 U/L (ref 35–104)
ALT SERPL-CCNC: 32 U/L (ref 5–33)
ANION GAP SERPL CALCULATED.3IONS-SCNC: 12 MMOL/L (ref 9–17)
AST SERPL-CCNC: 34 U/L
BASOPHILS # BLD: 0 % (ref 0–2)
BASOPHILS ABSOLUTE: 0.03 K/UL (ref 0–0.2)
BILIRUB SERPL-MCNC: 0.3 MG/DL (ref 0.3–1.2)
BUN SERPL-MCNC: 23 MG/DL (ref 6–20)
BUN/CREAT BLD: 32 (ref 9–20)
CALCIUM SERPL-MCNC: 10.2 MG/DL (ref 8.6–10.4)
CHLORIDE SERPL-SCNC: 100 MMOL/L (ref 98–107)
CO2 SERPL-SCNC: 26 MMOL/L (ref 20–31)
CREAT SERPL-MCNC: 0.71 MG/DL (ref 0.5–0.9)
EOSINOPHILS RELATIVE PERCENT: 1 % (ref 1–4)
GFR SERPL CREATININE-BSD FRML MDRD: >60 ML/MIN/1.73M2
GLUCOSE SERPL-MCNC: 108 MG/DL (ref 70–99)
HCT VFR BLD AUTO: 41.6 % (ref 36.3–47.1)
HGB BLD-MCNC: 13.4 G/DL (ref 11.9–15.1)
IMMATURE GRANULOCYTES: 0 %
LYMPHOCYTES # BLD: 12 % (ref 24–43)
MCH RBC QN AUTO: 28.2 PG (ref 25.2–33.5)
MCHC RBC AUTO-ENTMCNC: 32.2 G/DL (ref 28.4–34.8)
MCV RBC AUTO: 87.4 FL (ref 82.6–102.9)
MONOCYTES # BLD: 8 % (ref 3–12)
NRBC AUTOMATED: 0 PER 100 WBC
PDW BLD-RTO: 12.6 % (ref 11.8–14.4)
PLATELET # BLD AUTO: 306 K/UL (ref 138–453)
PMV BLD AUTO: 9.6 FL (ref 8.1–13.5)
POTASSIUM SERPL-SCNC: 3.9 MMOL/L (ref 3.7–5.3)
PROT SERPL-MCNC: 7.2 G/DL (ref 6.4–8.3)
RBC # BLD: 4.76 M/UL (ref 3.95–5.11)
SEG NEUTROPHILS: 79 % (ref 36–65)
SEGMENTED NEUTROPHILS ABSOLUTE COUNT: 6.64 K/UL (ref 1.5–8.1)
SODIUM SERPL-SCNC: 138 MMOL/L (ref 135–144)
WBC # BLD AUTO: 8.4 K/UL (ref 3.5–11.3)

## 2023-02-03 PROCEDURE — 85025 COMPLETE CBC W/AUTO DIFF WBC: CPT

## 2023-02-03 PROCEDURE — 2580000003 HC RX 258: Performed by: PHYSICIAN ASSISTANT

## 2023-02-03 PROCEDURE — 70491 CT SOFT TISSUE NECK W/DYE: CPT

## 2023-02-03 PROCEDURE — 6360000002 HC RX W HCPCS: Performed by: PHYSICIAN ASSISTANT

## 2023-02-03 PROCEDURE — 96365 THER/PROPH/DIAG IV INF INIT: CPT

## 2023-02-03 PROCEDURE — 6360000004 HC RX CONTRAST MEDICATION: Performed by: PHYSICIAN ASSISTANT

## 2023-02-03 PROCEDURE — 99285 EMERGENCY DEPT VISIT HI MDM: CPT

## 2023-02-03 PROCEDURE — 80053 COMPREHEN METABOLIC PANEL: CPT

## 2023-02-03 PROCEDURE — 96375 TX/PRO/DX INJ NEW DRUG ADDON: CPT

## 2023-02-03 RX ORDER — 0.9 % SODIUM CHLORIDE 0.9 %
1000 INTRAVENOUS SOLUTION INTRAVENOUS ONCE
Status: COMPLETED | OUTPATIENT
Start: 2023-02-03 | End: 2023-02-04

## 2023-02-03 RX ORDER — 0.9 % SODIUM CHLORIDE 0.9 %
80 INTRAVENOUS SOLUTION INTRAVENOUS ONCE
Status: DISCONTINUED | OUTPATIENT
Start: 2023-02-03 | End: 2023-02-04 | Stop reason: HOSPADM

## 2023-02-03 RX ORDER — SODIUM CHLORIDE 0.9 % (FLUSH) 0.9 %
10 SYRINGE (ML) INJECTION PRN
Status: DISCONTINUED | OUTPATIENT
Start: 2023-02-03 | End: 2023-02-04 | Stop reason: HOSPADM

## 2023-02-03 RX ORDER — MORPHINE SULFATE 4 MG/ML
4 INJECTION, SOLUTION INTRAMUSCULAR; INTRAVENOUS ONCE
Status: COMPLETED | OUTPATIENT
Start: 2023-02-03 | End: 2023-02-03

## 2023-02-03 RX ORDER — DEXAMETHASONE SODIUM PHOSPHATE 10 MG/ML
10 INJECTION, SOLUTION INTRAMUSCULAR; INTRAVENOUS ONCE
Status: COMPLETED | OUTPATIENT
Start: 2023-02-03 | End: 2023-02-03

## 2023-02-03 RX ADMIN — MORPHINE SULFATE 4 MG: 4 INJECTION, SOLUTION INTRAMUSCULAR; INTRAVENOUS at 23:31

## 2023-02-03 RX ADMIN — SODIUM CHLORIDE 3000 MG: 900 INJECTION INTRAVENOUS at 23:36

## 2023-02-03 RX ADMIN — Medication 80 ML: at 23:52

## 2023-02-03 RX ADMIN — SODIUM CHLORIDE, PRESERVATIVE FREE 10 ML: 5 INJECTION INTRAVENOUS at 23:51

## 2023-02-03 RX ADMIN — SODIUM CHLORIDE 1000 ML: 9 INJECTION, SOLUTION INTRAVENOUS at 23:30

## 2023-02-03 RX ADMIN — DEXAMETHASONE SODIUM PHOSPHATE 10 MG: 10 INJECTION, SOLUTION INTRAMUSCULAR; INTRAVENOUS at 23:31

## 2023-02-03 RX ADMIN — IOPAMIDOL 75 ML: 755 INJECTION, SOLUTION INTRAVENOUS at 23:51

## 2023-02-03 ASSESSMENT — ENCOUNTER SYMPTOMS
SORE THROAT: 1
RHINORRHEA: 0
COUGH: 0
EYE PAIN: 0
VOICE CHANGE: 1
COLOR CHANGE: 0
WHEEZING: 0
TROUBLE SWALLOWING: 1
NAUSEA: 0
BACK PAIN: 0
EYE ITCHING: 0
VOMITING: 0
EYE DISCHARGE: 0

## 2023-02-03 ASSESSMENT — PAIN DESCRIPTION - PAIN TYPE: TYPE: ACUTE PAIN

## 2023-02-03 ASSESSMENT — PAIN DESCRIPTION - LOCATION: LOCATION: THROAT

## 2023-02-03 ASSESSMENT — PAIN SCALES - GENERAL
PAINLEVEL_OUTOF10: 8
PAINLEVEL_OUTOF10: 5

## 2023-02-03 ASSESSMENT — PAIN - FUNCTIONAL ASSESSMENT: PAIN_FUNCTIONAL_ASSESSMENT: 0-10

## 2023-02-03 ASSESSMENT — PAIN DESCRIPTION - FREQUENCY: FREQUENCY: CONTINUOUS

## 2023-02-04 ENCOUNTER — ANESTHESIA (OUTPATIENT)
Dept: OPERATING ROOM | Age: 58
End: 2023-02-04
Payer: COMMERCIAL

## 2023-02-04 ENCOUNTER — HOSPITAL ENCOUNTER (INPATIENT)
Age: 58
LOS: 1 days | Discharge: HOME | DRG: 145 | End: 2023-02-04
Attending: INTERNAL MEDICINE | Admitting: STUDENT IN AN ORGANIZED HEALTH CARE EDUCATION/TRAINING PROGRAM
Payer: COMMERCIAL

## 2023-02-04 ENCOUNTER — ANESTHESIA EVENT (OUTPATIENT)
Dept: OPERATING ROOM | Age: 58
End: 2023-02-04
Payer: COMMERCIAL

## 2023-02-04 VITALS
WEIGHT: 131 LBS | RESPIRATION RATE: 19 BRPM | BODY MASS INDEX: 22.36 KG/M2 | HEART RATE: 80 BPM | DIASTOLIC BLOOD PRESSURE: 89 MMHG | SYSTOLIC BLOOD PRESSURE: 125 MMHG | HEIGHT: 64 IN | TEMPERATURE: 98.1 F | OXYGEN SATURATION: 97 %

## 2023-02-04 VITALS
DIASTOLIC BLOOD PRESSURE: 82 MMHG | OXYGEN SATURATION: 97 % | TEMPERATURE: 98.3 F | WEIGHT: 131 LBS | HEIGHT: 64 IN | RESPIRATION RATE: 12 BRPM | HEART RATE: 75 BPM | SYSTOLIC BLOOD PRESSURE: 119 MMHG | BODY MASS INDEX: 22.36 KG/M2

## 2023-02-04 PROBLEM — J36 PERITONSILLAR ABSCESS: Status: ACTIVE | Noted: 2023-02-04

## 2023-02-04 PROBLEM — K21.9 GASTROESOPHAGEAL REFLUX DISEASE WITHOUT ESOPHAGITIS: Status: ACTIVE | Noted: 2023-02-04

## 2023-02-04 PROCEDURE — 6360000002 HC RX W HCPCS: Performed by: EMERGENCY MEDICINE

## 2023-02-04 PROCEDURE — 2580000003 HC RX 258: Performed by: OTOLARYNGOLOGY

## 2023-02-04 PROCEDURE — 7100000001 HC PACU RECOVERY - ADDTL 15 MIN: Performed by: OTOLARYNGOLOGY

## 2023-02-04 PROCEDURE — 2709999900 HC NON-CHARGEABLE SUPPLY: Performed by: OTOLARYNGOLOGY

## 2023-02-04 PROCEDURE — 0C9P0ZZ DRAINAGE OF TONSILS, OPEN APPROACH: ICD-10-PCS | Performed by: OTOLARYNGOLOGY

## 2023-02-04 PROCEDURE — 2580000003 HC RX 258: Performed by: STUDENT IN AN ORGANIZED HEALTH CARE EDUCATION/TRAINING PROGRAM

## 2023-02-04 PROCEDURE — 3700000001 HC ADD 15 MINUTES (ANESTHESIA): Performed by: OTOLARYNGOLOGY

## 2023-02-04 PROCEDURE — 6370000000 HC RX 637 (ALT 250 FOR IP): Performed by: EMERGENCY MEDICINE

## 2023-02-04 PROCEDURE — 3600000004 HC SURGERY LEVEL 4 BASE: Performed by: OTOLARYNGOLOGY

## 2023-02-04 PROCEDURE — 6360000002 HC RX W HCPCS: Performed by: STUDENT IN AN ORGANIZED HEALTH CARE EDUCATION/TRAINING PROGRAM

## 2023-02-04 PROCEDURE — 2580000003 HC RX 258: Performed by: NURSE ANESTHETIST, CERTIFIED REGISTERED

## 2023-02-04 PROCEDURE — 6360000002 HC RX W HCPCS: Performed by: NURSE ANESTHETIST, CERTIFIED REGISTERED

## 2023-02-04 PROCEDURE — 99222 1ST HOSP IP/OBS MODERATE 55: CPT | Performed by: STUDENT IN AN ORGANIZED HEALTH CARE EDUCATION/TRAINING PROGRAM

## 2023-02-04 PROCEDURE — 94761 N-INVAS EAR/PLS OXIMETRY MLT: CPT

## 2023-02-04 PROCEDURE — 2500000003 HC RX 250 WO HCPCS: Performed by: NURSE ANESTHETIST, CERTIFIED REGISTERED

## 2023-02-04 PROCEDURE — 7100000000 HC PACU RECOVERY - FIRST 15 MIN: Performed by: OTOLARYNGOLOGY

## 2023-02-04 PROCEDURE — 96375 TX/PRO/DX INJ NEW DRUG ADDON: CPT

## 2023-02-04 PROCEDURE — 3700000000 HC ANESTHESIA ATTENDED CARE: Performed by: OTOLARYNGOLOGY

## 2023-02-04 PROCEDURE — 1200000000 HC SEMI PRIVATE

## 2023-02-04 PROCEDURE — 3600000014 HC SURGERY LEVEL 4 ADDTL 15MIN: Performed by: OTOLARYNGOLOGY

## 2023-02-04 RX ORDER — ACETAMINOPHEN 650 MG/1
650 SUPPOSITORY RECTAL EVERY 6 HOURS PRN
Status: DISCONTINUED | OUTPATIENT
Start: 2023-02-04 | End: 2023-02-04 | Stop reason: HOSPADM

## 2023-02-04 RX ORDER — ENOXAPARIN SODIUM 100 MG/ML
40 INJECTION SUBCUTANEOUS DAILY
Status: DISCONTINUED | OUTPATIENT
Start: 2023-02-04 | End: 2023-02-04 | Stop reason: HOSPADM

## 2023-02-04 RX ORDER — MELOXICAM 15 MG/1
15 TABLET ORAL DAILY PRN
Qty: 30 TABLET | Refills: 3 | Status: SHIPPED | OUTPATIENT
Start: 2023-02-04

## 2023-02-04 RX ORDER — SODIUM CHLORIDE 0.9 % (FLUSH) 0.9 %
5-40 SYRINGE (ML) INJECTION EVERY 12 HOURS SCHEDULED
Status: DISCONTINUED | OUTPATIENT
Start: 2023-02-04 | End: 2023-02-04 | Stop reason: HOSPADM

## 2023-02-04 RX ORDER — SODIUM CHLORIDE, SODIUM LACTATE, POTASSIUM CHLORIDE, CALCIUM CHLORIDE 600; 310; 30; 20 MG/100ML; MG/100ML; MG/100ML; MG/100ML
INJECTION, SOLUTION INTRAVENOUS CONTINUOUS PRN
Status: DISCONTINUED | OUTPATIENT
Start: 2023-02-04 | End: 2023-02-04 | Stop reason: SDUPTHER

## 2023-02-04 RX ORDER — PROPOFOL 10 MG/ML
INJECTION, EMULSION INTRAVENOUS PRN
Status: DISCONTINUED | OUTPATIENT
Start: 2023-02-04 | End: 2023-02-04 | Stop reason: SDUPTHER

## 2023-02-04 RX ORDER — SODIUM CHLORIDE 9 MG/ML
INJECTION, SOLUTION INTRAVENOUS PRN
Status: DISCONTINUED | OUTPATIENT
Start: 2023-02-04 | End: 2023-02-04 | Stop reason: HOSPADM

## 2023-02-04 RX ORDER — SODIUM CHLORIDE, SODIUM LACTATE, POTASSIUM CHLORIDE, CALCIUM CHLORIDE 600; 310; 30; 20 MG/100ML; MG/100ML; MG/100ML; MG/100ML
INJECTION, SOLUTION INTRAVENOUS CONTINUOUS
Status: DISCONTINUED | OUTPATIENT
Start: 2023-02-04 | End: 2023-02-04 | Stop reason: HOSPADM

## 2023-02-04 RX ORDER — FENTANYL CITRATE 50 UG/ML
25 INJECTION, SOLUTION INTRAMUSCULAR; INTRAVENOUS EVERY 5 MIN PRN
Status: DISCONTINUED | OUTPATIENT
Start: 2023-02-04 | End: 2023-02-04 | Stop reason: HOSPADM

## 2023-02-04 RX ORDER — POTASSIUM CHLORIDE 7.45 MG/ML
10 INJECTION INTRAVENOUS PRN
Status: DISCONTINUED | OUTPATIENT
Start: 2023-02-04 | End: 2023-02-04 | Stop reason: HOSPADM

## 2023-02-04 RX ORDER — AMOXICILLIN AND CLAVULANATE POTASSIUM 875; 125 MG/1; MG/1
1 TABLET, FILM COATED ORAL 2 TIMES DAILY
Qty: 28 TABLET | Refills: 0 | Status: SHIPPED | OUTPATIENT
Start: 2023-02-04 | End: 2023-02-18

## 2023-02-04 RX ORDER — SUCCINYLCHOLINE/SOD CL,ISO/PF 100 MG/5ML
SYRINGE (ML) INTRAVENOUS PRN
Status: DISCONTINUED | OUTPATIENT
Start: 2023-02-04 | End: 2023-02-04 | Stop reason: SDUPTHER

## 2023-02-04 RX ORDER — ACETAMINOPHEN 500 MG
1000 TABLET ORAL ONCE
Status: COMPLETED | OUTPATIENT
Start: 2023-02-04 | End: 2023-02-04

## 2023-02-04 RX ORDER — MORPHINE SULFATE 2 MG/ML
2 INJECTION, SOLUTION INTRAMUSCULAR; INTRAVENOUS EVERY 5 MIN PRN
Status: DISCONTINUED | OUTPATIENT
Start: 2023-02-04 | End: 2023-02-04 | Stop reason: HOSPADM

## 2023-02-04 RX ORDER — ONDANSETRON 4 MG/1
4 TABLET, ORALLY DISINTEGRATING ORAL EVERY 8 HOURS PRN
Status: DISCONTINUED | OUTPATIENT
Start: 2023-02-04 | End: 2023-02-04 | Stop reason: HOSPADM

## 2023-02-04 RX ORDER — SODIUM CHLORIDE 0.9 % (FLUSH) 0.9 %
10 SYRINGE (ML) INJECTION PRN
Status: DISCONTINUED | OUTPATIENT
Start: 2023-02-04 | End: 2023-02-04 | Stop reason: HOSPADM

## 2023-02-04 RX ORDER — POLYETHYLENE GLYCOL 3350 17 G/17G
17 POWDER, FOR SOLUTION ORAL DAILY PRN
Status: DISCONTINUED | OUTPATIENT
Start: 2023-02-04 | End: 2023-02-04 | Stop reason: HOSPADM

## 2023-02-04 RX ORDER — ONDANSETRON 2 MG/ML
4 INJECTION INTRAMUSCULAR; INTRAVENOUS EVERY 6 HOURS PRN
Status: DISCONTINUED | OUTPATIENT
Start: 2023-02-04 | End: 2023-02-04 | Stop reason: HOSPADM

## 2023-02-04 RX ORDER — POTASSIUM CHLORIDE 20 MEQ/1
40 TABLET, EXTENDED RELEASE ORAL PRN
Status: DISCONTINUED | OUTPATIENT
Start: 2023-02-04 | End: 2023-02-04 | Stop reason: HOSPADM

## 2023-02-04 RX ORDER — LIDOCAINE HYDROCHLORIDE 10 MG/ML
INJECTION, SOLUTION EPIDURAL; INFILTRATION; INTRACAUDAL; PERINEURAL PRN
Status: DISCONTINUED | OUTPATIENT
Start: 2023-02-04 | End: 2023-02-04 | Stop reason: SDUPTHER

## 2023-02-04 RX ORDER — MAGNESIUM SULFATE 1 G/100ML
1000 INJECTION INTRAVENOUS PRN
Status: DISCONTINUED | OUTPATIENT
Start: 2023-02-04 | End: 2023-02-04 | Stop reason: HOSPADM

## 2023-02-04 RX ORDER — FENTANYL CITRATE 50 UG/ML
INJECTION, SOLUTION INTRAMUSCULAR; INTRAVENOUS PRN
Status: DISCONTINUED | OUTPATIENT
Start: 2023-02-04 | End: 2023-02-04 | Stop reason: SDUPTHER

## 2023-02-04 RX ORDER — MAGNESIUM HYDROXIDE 1200 MG/15ML
LIQUID ORAL CONTINUOUS PRN
Status: DISCONTINUED | OUTPATIENT
Start: 2023-02-04 | End: 2023-02-04 | Stop reason: HOSPADM

## 2023-02-04 RX ORDER — SODIUM CHLORIDE 0.9 % (FLUSH) 0.9 %
5-40 SYRINGE (ML) INJECTION PRN
Status: DISCONTINUED | OUTPATIENT
Start: 2023-02-04 | End: 2023-02-04 | Stop reason: HOSPADM

## 2023-02-04 RX ORDER — ACETAMINOPHEN 325 MG/1
650 TABLET ORAL EVERY 6 HOURS PRN
Status: DISCONTINUED | OUTPATIENT
Start: 2023-02-04 | End: 2023-02-04 | Stop reason: HOSPADM

## 2023-02-04 RX ORDER — DEXAMETHASONE SODIUM PHOSPHATE 10 MG/ML
INJECTION INTRAMUSCULAR; INTRAVENOUS PRN
Status: DISCONTINUED | OUTPATIENT
Start: 2023-02-04 | End: 2023-02-04 | Stop reason: SDUPTHER

## 2023-02-04 RX ORDER — ONDANSETRON 2 MG/ML
INJECTION INTRAMUSCULAR; INTRAVENOUS PRN
Status: DISCONTINUED | OUTPATIENT
Start: 2023-02-04 | End: 2023-02-04 | Stop reason: SDUPTHER

## 2023-02-04 RX ORDER — ONDANSETRON 2 MG/ML
4 INJECTION INTRAMUSCULAR; INTRAVENOUS ONCE
Status: COMPLETED | OUTPATIENT
Start: 2023-02-04 | End: 2023-02-04

## 2023-02-04 RX ADMIN — FENTANYL CITRATE 50 MCG: 50 INJECTION, SOLUTION INTRAMUSCULAR; INTRAVENOUS at 10:51

## 2023-02-04 RX ADMIN — ONDANSETRON 4 MG: 2 INJECTION INTRAMUSCULAR; INTRAVENOUS at 10:57

## 2023-02-04 RX ADMIN — LIDOCAINE HYDROCHLORIDE 50 MG: 10 INJECTION, SOLUTION EPIDURAL; INFILTRATION; INTRACAUDAL; PERINEURAL at 10:46

## 2023-02-04 RX ADMIN — ONDANSETRON 4 MG: 2 INJECTION INTRAMUSCULAR; INTRAVENOUS at 05:02

## 2023-02-04 RX ADMIN — SODIUM CHLORIDE, POTASSIUM CHLORIDE, SODIUM LACTATE AND CALCIUM CHLORIDE: 600; 310; 30; 20 INJECTION, SOLUTION INTRAVENOUS at 10:28

## 2023-02-04 RX ADMIN — FENTANYL CITRATE 50 MCG: 50 INJECTION, SOLUTION INTRAMUSCULAR; INTRAVENOUS at 10:46

## 2023-02-04 RX ADMIN — PROPOFOL 100 MG: 10 INJECTION, EMULSION INTRAVENOUS at 10:46

## 2023-02-04 RX ADMIN — DEXAMETHASONE SODIUM PHOSPHATE 10 MG: 10 INJECTION INTRAMUSCULAR; INTRAVENOUS at 10:57

## 2023-02-04 RX ADMIN — Medication 80 MG: at 10:46

## 2023-02-04 RX ADMIN — PROPOFOL 100 MG: 10 INJECTION, EMULSION INTRAVENOUS at 10:48

## 2023-02-04 RX ADMIN — SODIUM CHLORIDE, POTASSIUM CHLORIDE, SODIUM LACTATE AND CALCIUM CHLORIDE: 600; 310; 30; 20 INJECTION, SOLUTION INTRAVENOUS at 10:40

## 2023-02-04 RX ADMIN — SODIUM CHLORIDE 3000 MG: 900 INJECTION INTRAVENOUS at 10:51

## 2023-02-04 RX ADMIN — ACETAMINOPHEN 1000 MG: 500 TABLET ORAL at 05:01

## 2023-02-04 ASSESSMENT — ENCOUNTER SYMPTOMS
TROUBLE SWALLOWING: 1
NAUSEA: 0
COUGH: 0
EYE ITCHING: 0
SINUS PRESSURE: 1
EYE DISCHARGE: 0
SHORTNESS OF BREATH: 0
ABDOMINAL PAIN: 0
SORE THROAT: 1
ABDOMINAL DISTENTION: 0
VOICE CHANGE: 1
BACK PAIN: 0

## 2023-02-04 ASSESSMENT — PAIN SCALES - GENERAL
PAINLEVEL_OUTOF10: 8
PAINLEVEL_OUTOF10: 0
PAINLEVEL_OUTOF10: 5

## 2023-02-04 ASSESSMENT — PAIN DESCRIPTION - PAIN TYPE
TYPE: SURGICAL PAIN
TYPE: ACUTE PAIN

## 2023-02-04 ASSESSMENT — PAIN - FUNCTIONAL ASSESSMENT: PAIN_FUNCTIONAL_ASSESSMENT: 0-10

## 2023-02-04 ASSESSMENT — PAIN DESCRIPTION - DESCRIPTORS: DESCRIPTORS: ACHING

## 2023-02-04 ASSESSMENT — PAIN DESCRIPTION - LOCATION: LOCATION: THROAT

## 2023-02-04 ASSESSMENT — PAIN DESCRIPTION - FREQUENCY: FREQUENCY: CONTINUOUS

## 2023-02-04 NOTE — ED NOTES
Pt presents to the er c/o pharyngitis and otaligia pt has been having this problem for the past one week and has been seen several times at an urgent care and diagnosed with a peritonsilar abcess pt is with respirations even and unlabored      Cheryle DowdyLehigh [facility]  02/03/23 [id number]

## 2023-02-04 NOTE — ANESTHESIA PRE PROCEDURE
Department of Anesthesiology  Preprocedure Note       Name:  Vee Valle   Age:  62 y.o.  :  1965                                          MRN:  [id number]         Date:  2023      Surgeon: John Ureña):  Aubree Flores MD    Procedure: Procedure(s):  LEFT TONSILLAR INCISION AND DRAINAGE    Medications prior to admission:   Prior to Admission medications    Medication Sig Start Date End Date Taking? Authorizing Provider   cephALEXin (KEFLEX) 500 MG capsule Take 1 capsule by mouth 4 times daily 4 capsules by mouth 1hr before dental procedure  Patient not taking: Reported on 2023 1/10/23   Tracy Florence PA-C   pantoprazole (PROTONIX) 40 MG tablet  22   Historical Provider, MD   turmeric 500 MG CAPS Take 1,000 mg by mouth daily    Historical Provider, MD   meloxicam (MOBIC) 15 MG tablet Take 15 mg by mouth daily  Patient not taking: Reported on 2023    Historical Provider, MD   Levocetirizine Dihydrochloride (XYZAL PO) Take by mouth  Patient not taking: Reported on 2023    Historical Provider, MD   acetaminophen (TYLENOL) 325 MG tablet Take 650 mg by mouth every 6 hours as needed for Pain    Historical Provider, MD       Current medications:    No current facility-administered medications for this visit. No current outpatient medications on file.      Facility-Administered Medications Ordered in Other Visits   Medication Dose Route Frequency Provider Last Rate Last Admin    [MAR Hold] sodium chloride flush 0.9 % injection 5-40 mL  5-40 mL IntraVENous 2 times per day DENISE Clements CNP        [MAR Hold] sodium chloride flush 0.9 % injection 10 mL  10 mL IntraVENous PRN DENISE Clements CNP        [MAR Hold] 0.9 % sodium chloride infusion   IntraVENous PRN DENISE Clements CNP        [MAR Hold] potassium chloride (KLOR-CON M) extended release tablet 40 mEq  40 mEq Oral PRN DENISE Clements CNP        Or    [MAR Hold] potassium bicarb-citric acid (EFFER-K) effervescent tablet 40 mEq  40 mEq Oral PRN Mamta NICOLE Moreland, APRN - CNP        Or    [MAR Hold] potassium chloride 10 mEq/100 mL IVPB (Peripheral Line)  10 mEq IntraVENous PRN Mamta NICOLE Moreland APRN - CNP        [MAR Hold] magnesium sulfate 1000 mg in dextrose 5% 100 mL IVPB  1,000 mg IntraVENous PRN Mamta NICOLE Moreland, APRN - CNP        [Held by provider] enoxaparin (LOVENOX) injection 40 mg  40 mg SubCUTAneous Daily Mamta NICOLE Moreland APRN - CNP        [MAR Hold] ondansetron (ZOFRAN-ODT) disintegrating tablet 4 mg  4 mg Oral Q8H PRN Mamta NICOLE Moreland, APRN - CNP        Or    [MAR Hold] ondansetron (ZOFRAN) injection 4 mg  4 mg IntraVENous Q6H PRN Mamta NICOLE Moreland, APRN - CNP        [MAR Hold] polyethylene glycol (GLYCOLAX) packet 17 g  17 g Oral Daily PRN Mamta NICOLE Moreland APRN - CNP        [MAR Hold] acetaminophen (TYLENOL) tablet 650 mg  650 mg Oral Q6H PRN Mamta NICOLE Moreland, APRN - CNP        Or    [MAR Hold] acetaminophen (TYLENOL) suppository 650 mg  650 mg Rectal Q6H PRN Mamta DENISE Pelletier - CNP        [MAR Hold] ampicillin-sulbactam (UNASYN) 3,000 mg in sodium chloride 0.9 % 100 mL IVPB (mini-bag)  3,000 mg IntraVENous Evan Calderon MD       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    Melanoma of skin ([facility] [address] C43.9    Breast cyst N60.09    Status post total bilateral knee replacement Z96.653    Primary osteoarthritis of both knees M17.0    Hx of peptic ulcer Z87.11    Low back pain with right-sided sciatica M54.41    Breast mass, right N63.10    Peritonsillar abscess J36       Past Medical History:        Diagnosis Date    Abnormal Pap smear, low grade squamous intraepithelial lesion (LGSIL) 2004    Chronic headaches     Duodenal ulcer     history of ulcer over 1 yr ago (8/2016)    History of renal stone     passed    Melanoma of skin, site unspecified 2008    Malignant melanoma    MTHFR mutation     HETERO    Osteoarthritis of both knees     Pyloric stenosis     repaired as infant    Scoliosis        Past Surgical History:        Procedure Laterality Date    ENDOSCOPY, COLON, DIAGNOSTIC      EYE SURGERY      lazy eye    HYSTEROSCOPY  2008    JOINT REPLACEMENT      bilat knees    KNEE ARTHROPLASTY Bilateral 09/21/2016    SKIN BIOPSY      SKIN CANCER EXCISION  2008    UPPER GASTROINTESTINAL ENDOSCOPY N/A 12/30/2021    EGD BIOPSY performed by Lon Hutton MD at [facility] 5/19/2022    EGD BIOPSY performed by Lon Hutton MD at [place] [address] History:    Social History     Tobacco Use    Smoking status: Never    Smokeless tobacco: Never   Substance Use Topics    Alcohol use: Yes     Comment: social                                Counseling given: Not Answered      Vital Signs (Current): There were no vitals filed for this visit.                                            BP Readings from Last 3 Encounters:   02/04/23 113/71   02/04/23 125/89   10/06/22 117/78       NPO Status:                                                                                 BMI:   Wt Readings from Last 3 Encounters:   02/04/23 131 lb (59.4 kg)   02/03/23 131 lb (59.4 kg)   10/06/22 143 lb (64.9 kg)     There is no height or weight on file to calculate BMI.    CBC:   Lab Results   Component Value Date/Time    WBC 8.4 02/03/2023 10:21 PM    RBC 4.76 02/03/2023 10:21 PM    HGB 13.4 02/03/2023 10:21 PM    HCT 41.6 02/03/2023 10:21 PM    MCV 87.4 02/03/2023 10:21 PM    RDW 12.6 02/03/2023 10:21 PM     02/03/2023 10:21 PM       CMP:   Lab Results   Component Value Date/Time     02/03/2023 10:21 PM    K 3.9 02/03/2023 10:21 PM     02/03/2023 10:21 PM    CO2 26 02/03/2023 10:21 PM    BUN 23 02/03/2023 10:21 PM    CREATININE 0.71 02/03/2023 10:21 PM    GFRAA >60 12/07/2021 02:08 PM    LABGLOM >60 02/03/2023 10:21 PM    GLUCOSE 108 02/03/2023 10:21 PM    PROT 7.2 02/03/2023 10:21 PM    CALCIUM 10.2 02/03/2023 10:21 PM    BILITOT 0.3 02/03/2023 10:21 PM    ALKPHOS 141 02/03/2023 10:21 PM    AST 34 02/03/2023 10:21 PM    ALT 32 02/03/2023 10:21 PM       POC Tests: No results for input(s): POCGLU, POCNA, POCK, POCCL, POCBUN, POCHEMO, POCHCT in the last 72 hours. Coags:   Lab Results   Component Value Date/Time    PROTIME 10.1 08/31/2016 11:18 AM    INR 1.0 08/31/2016 11:18 AM    APTT 25.7 08/31/2016 11:18 AM       HCG (If Applicable):   Lab Results   Component Value Date    PREGTESTUR NEGATIVE 03/22/2014    HCG NEGATIVE 09/21/2016        ABGs: No results found for: PHART, PO2ART, APE8HHO, EUU1ZOH, BEART, M9OQWYYS     Type & Screen (If Applicable):  No results found for: LABABO, LABRH    Drug/Infectious Status (If Applicable):  No results found for: HIV, HEPCAB    COVID-19 Screening (If Applicable): No results found for: COVID19        Anesthesia Evaluation  Patient summary reviewed and Nursing notes reviewed no history of anesthetic complications:   Airway: Mallampati: II  TM distance: >3 FB   Neck ROM: full  Mouth opening: > = 3 FB   Dental: normal exam         Pulmonary:normal exam                              ROS comment: Left peritonsillar abscess. Patient currently endorses no shortness of breath. Cardiovascular:Negative CV ROS  Exercise tolerance: good (>4 METS),                     Neuro/Psych:   (+) headaches:,             GI/Hepatic/Renal:             Endo/Other:    (+) blood dyscrasia::., malignancy/cancer (skin). Abdominal:             Vascular: negative vascular ROS. Other Findings:             Anesthesia Plan      general     ASA 2 - emergent     (GA ETT, glidescope)  Induction: intravenous.     MIPS: Postoperative opioids intended, Prophylactic antiemetics administered and Postoperative trial extubation. Anesthetic plan and risks discussed with patient. Use of blood products discussed with patient whom consented to blood products. Plan discussed with CRNA.                     Tommy Newman MD   2/4/2023

## 2023-02-04 NOTE — ANESTHESIA POSTPROCEDURE EVALUATION
Department of Anesthesiology  Postprocedure Note    Patient: Flower Sharp  MRN: [id number]  YOB: 1965  Date of evaluation: 2/4/2023      Procedure Summary     Date: 02/04/23 Room / Location: [address]    Anesthesia Start: 8649 Anesthesia Stop: 1119    Procedure: LEFT TONSILLAR INCISION AND DRAINAGE (Left) Diagnosis:       Peritonsillar abscess      (LEFT PERITONSILLAR ABSCESS)    Surgeons: Yadiel Nguyen MD Responsible Provider: Francisco Dee MD    Anesthesia Type: general ASA Status: 2 - Emergent          Anesthesia Type: No value filed.     Andra Phase I: Andra Score: 10    Andra Phase II:        Anesthesia Post Evaluation    Patient location during evaluation: bedside  Patient participation: complete - patient participated  Level of consciousness: awake  Airway patency: patent  Nausea & Vomiting: no nausea and no vomiting  Complications: no  Cardiovascular status: hemodynamically stable  Respiratory status: acceptable  Hydration status: stable  Comments: /82   Pulse 75   Temp 98.3 °F (36.8 °C)   Resp 12   Ht 5' 4\" (1.626 m)   Wt 131 lb (59.4 kg)   LMP  (LMP Unknown)   SpO2 97%   BMI 22.49 kg/m²

## 2023-02-04 NOTE — CONSULTS
CONSULTING SERVICE: Otolaryngology-Head and Neck Surgery    Informant:   The history was obtained from patient. Chief Complaint:   Her chief complaint is L PTA    History of Present Illness:   Joselyn Matthew is a 62 y.o. female seen consultation at the request of LILA REID on 2/4/2023. Seen at [facility] yesterday and found to have a L peritonsillar abscess on exam.     On history, has had throat pain for the past 7 days. Had some low-grade temps and chills over the past 1 week. Started amoxil about 4 days ago. No h/o tobacco/alcohol. Works as a PT at [facility].     No h/o snoring or strep tonsillitis. Had previous R back melanoma excision and knee replacements. Currently on unasyn. Other Pertinent ENT-specific HPI:  None    Pertinent Social/Birth/Family/Medical/Surgical History   As above    Examination:   Vital Signs   Vitals:    02/04/23 0530 02/04/23 0545 02/04/23 0600 02/04/23 0807   BP: 131/84   107/70   Pulse: 69   69   Resp: 18   17   Temp: 98.6 °F (37 °C)   98.7 °F (37.1 °C)   TempSrc: Oral   Oral   SpO2: 96%   95%   Weight:  131 lb (59.4 kg) 131 lb (59.4 kg)    Height:  5' 4\" (1.626 m)         Constitutional   General Appearance: well developed and well nourished and in no acute distress  Speech: age appropriate    Head & Face   Head:   normocephalic and symmetric  Eyes: no eyelid swelling, no conjunctival injection or exudate, pupils equal round and reactive to light    Ears   Right EXT: normal  Right EAC: patent  Right TM: normal landmarks and mobility    Left EXT: normal  Left EAC: patent  Left TM: normal landmarks and mobility    Hearing: is responsive to whispered voice. Nose   Dorsum: dorsum midline  Nasal mucosa: no edema. Rhinorrhea: no drainage  Septum: midline.  No perforation  Turbinates: no inferior turbinate hypertrophy  Nasopharynx Unable to perform indirect mirror laryngoscopy due to patient age and intolerance of exam    Oral Cavity, Oropharynx   Lips: normal  Dentition: dentition grossly normal   Oral mucosa: moist, pink  Gums: normal  Palate: intact, mobile  Pharynx: intact mobile  Posterior pharyngeal wall: normal  Tongue: intact, full range of motion; floor of mouth: no lesions  Tonsil size:  uvula deviated to R side. L tonsil very edematous and full and exophytic and extending to midline. Peritonsillar fullness on palpation. Trismus 2-2.5 FBs    Neck   Trachea: midline  Thyroid: normal  Salivary glands: no parotid or submandibular masses or tenderness noted. Lymphatic Nodes: no palpable adenopathy    Respiratory   Auscultation: not examined  Effort: no retractions noted  Voice: clear  Chest movement: symmetrical    Cardiac   Auscultation: not examined     Neuro/ Psych   Cranial Nerves: II-XII intact  Orientation: age appropriate  Mood & Affect: age appropriate    Additional data reviewed:    None    Procedures:    None    Surgical risk factors:  None    -----------------------------------------------------------------  Visit Diagnosis/Assessment:   Tanisha Mejias is a 62 y.o. female with:    Left Peritonsillar abscess [J36]      Plan:  Left Peritonsillar Abscess I&D today in the OR      Continue NPO until surgery. Consent obtained.        --------------------------------------------------  Blanquita Morneo MD  Pediatric Otolaryngology-Head and Neck Surgery  [address] [place] [address] Otolaryngology group    Office  ph# [phone]    Also available in [facility]

## 2023-02-04 NOTE — CARE COORDINATION
Case Management Assessment  Initial Evaluation    Date/Time of Evaluation: 2/4/2023 2:51 PM  Assessment Completed by: Annette White RN    If patient is discharged prior to next notation, then this note serves as note for discharge by case management. Patient Name: Edilson Davalos                   YOB: 1965  Diagnosis: Peritonsillar abscess [J36]                   Date / Time: 2/4/2023  5:23 AM    Patient Admission Status: Inpatient   Readmission Risk (Low < 19, Mod (19-27), High > 27): Readmission Risk Score: 6.8    Current PCP: Catie Flor MD  PCP verified by CM? Yes    Chart Reviewed: Yes      History Provided by: Patient  Patient Orientation: Alert and Oriented    Patient Cognition: Alert    Hospitalization in the last 30 days (Readmission):  No    If yes, Readmission Assessment in  Navigator will be completed. Advance Directives:      Code Status: Full Code   Patient's Primary Decision Maker is: Legal Next of Kin      Discharge Planning:    Patient lives with: Spouse/Significant Other, Children Type of Home: House  Primary Care Giver: Self  Patient Support Systems include: Spouse/Significant Other   Current Financial resources:    Current community resources:    Current services prior to admission: None            Current DME:              Type of Home Care services:  None    ADLS  Prior functional level: Independent in ADLs/IADLs  Current functional level: Independent in ADLs/IADLs    PT AM-PAC:   /24  OT AM-PAC:   /24    Family can provide assistance at DC: Yes  Would you like Case Management to discuss the discharge plan with any other family members/significant others, and if so, who?  Yes  Plans to Return to Present Housing: Yes  Other Identified Issues/Barriers to RETURNING to current housing:   Potential Assistance needed at discharge: N/A            Potential DME:    Patient expects to discharge to: [address] [place] [address] for transportation at discharge: Family    Financial    Payor: BCBS / Plan: [place] [employer] [id number] / Product Type: *No Product type* /     Does insurance require precert for SNF: Yes    Potential assistance Purchasing Medications: No  Meds-to-Beds request:        [address] [facility] [address] Alan osorio, [address] [facility] [phone] Nette Goldstein [phone]  [address] [address] [address]  Phone: [phone] Fax: [phone]    [address] [facility] [address] [place], [address] [facility] [phone] Nette Goldstein [phone]  [address] [place] [address]  Phone: [phone] Fax: [phone]      Notes:    Factors facilitating achievement of predicted outcomes: Family support    Barriers to discharge: pt has been waiting for food for couple hours    Additional Case Management Notes: 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 Pt has been waiting for a soft food tray for couple of hours. She voiced frustration. She said the unit RN and Oma Mayorga have been helpful, but dietary not responding to her needs. Met with Oma Mayorga, medical assistant and he is going to get pt snack from this floor before her tray is delivered. Oma Mayorga called 5-10 minutes ago, again. The Plan for Transition of Care is related to the following treatment goals of Peritonsillar abscess [O60]    IF APPLICABLE: The Patient and/or patient representative Tanisha Mejias and her family were provided with a choice of provider and agrees with the discharge plan. Freedom of choice list with basic dialogue that supports the patient's individualized plan of care/goals and shares the quality data associated with the providers was provided to:     Patient Representative Name:       The Patient and/or Patient Representative Agree with the Discharge Plan?       Annette White RN  Case Management Department  Ph: [phone] Fax:

## 2023-02-04 NOTE — DISCHARGE SUMMARY
[facility]  Office: [address], DO, Daisyjose Tomas, DO, Chongbarbara Alcantara, DO, Jason Cuevas Blood, DO, Jay Tripp MD, Lilli Sommers MD, Parveen Davalos MD, Britta Schwartz MD,  Marly Veliz MD, Abhay Carballo MD, Faith Varela, DO, Lamar Andersen MD,  Tito Brock, DO, Rachel Ruth MD, Devon Yi MD, Carol Lutz DO, Ailin Ortega MD, Shaun Cuevas MD, Daniel Pineda, DO, Erich Gonzalez MD, Tashia Maldonado MD, Luis A Vasquez MD, Candace Ortiz MD, Sarika Caldwell, DO, Kimberly Dorado MD, Chuck Sanders MD, Tl Conrad, [facility],  Eden Garzon, CNP, Cullen Mayes, CNP, Tiffanie Carter, CNP,  Franki Lundborg, [facility], Robin Roberson, CNP, Isak Patel, CNP, Cristal Henry, CNP, Lizet Singh, CNP, Claudia Carpenter, CNP, Marlene Mtz PAFionaC, Russ Moraes, CNS, Sol Wheeler, CNP, Mk Reza, [facility]    Discharge Summary     Patient ID: Zheng Tran  :  1965   MRN: [id number]     ACCOUNT:  [de-identified]   Patient's PCP: Chris Espinoza MD  Admit Date: 2023   Discharge Date: 2023     Length of Stay: 0  Code Status:  Full Code  Admitting Physician: Rachel Ruth MD  Discharge Physician: Rachel Ruth MD     Active Discharge Diagnoses:     Hospital Problem Lists:  Principal Problem:    Peritonsillar abscess  Active Problems:    Gastroesophageal reflux disease without esophagitis  Resolved Problems:    * No resolved hospital problems. *      Admission Condition:  poor     Discharged Condition: good    Hospital Stay:     Hospital Course:  Zheng Tran is a 62 y.o. female who was admitted for the management of   Peritonsillar abscess , presented to ER with sore throat. 60-year-old female with history of renal stone, GERD with He's esophagus on Protonix , osteoarthritis bilateral knee replacement presents to the hospital with 6-7 day history of left-sided sore throat.   Patient pain was increasing. Her voice was changing and patient had trouble swallowing food. Urgent care but was given eyedrops. As her symptoms did not improve and continued to worsen she called her PCP who advised her to come to the hospital to rule out abscess. Also reported low-grade temperature and chills. She had started amoxicillin at home. Patient was afebrile on arrival, vitals stable. White count 8.4, glucose 100, CT soft tissue showed acute tonsillitis with left peritonsillar abscess. Patient was taken for incision and drainage by ENT on 2/4. sHe was okay for discharge post procedure after tolerating diet.       Significant therapeutic interventions: as above    Significant Diagnostic Studies:   Labs / Micro:  CBC:   Lab Results   Component Value Date/Time    WBC 8.4 02/03/2023 10:21 PM    RBC 4.76 02/03/2023 10:21 PM    HGB 13.4 02/03/2023 10:21 PM    HCT 41.6 02/03/2023 10:21 PM    MCV 87.4 02/03/2023 10:21 PM    MCH 28.2 02/03/2023 10:21 PM    MCHC 32.2 02/03/2023 10:21 PM    RDW 12.6 02/03/2023 10:21 PM     02/03/2023 10:21 PM     BMP:    Lab Results   Component Value Date/Time    GLUCOSE 108 02/03/2023 10:21 PM     02/03/2023 10:21 PM    K 3.9 02/03/2023 10:21 PM     02/03/2023 10:21 PM    CO2 26 02/03/2023 10:21 PM    ANIONGAP 12 02/03/2023 10:21 PM    BUN 23 02/03/2023 10:21 PM    CREATININE 0.71 02/03/2023 10:21 PM    BUNCRER 32 02/03/2023 10:21 PM    CALCIUM 10.2 02/03/2023 10:21 PM    LABGLOM >60 02/03/2023 10:21 PM    GFRAA >60 12/07/2021 02:08 PM    GFR      12/07/2021 02:08 PM    GFR NOT REPORTED 12/07/2021 02:08 PM     HFP:    Lab Results   Component Value Date/Time    PROT 7.2 02/03/2023 10:21 PM     CMP:    Lab Results   Component Value Date/Time    GLUCOSE 108 02/03/2023 10:21 PM     02/03/2023 10:21 PM    K 3.9 02/03/2023 10:21 PM     02/03/2023 10:21 PM    CO2 26 02/03/2023 10:21 PM    BUN 23 02/03/2023 10:21 PM    CREATININE 0.71 02/03/2023 10:21 PM ANIONGAP 12 02/03/2023 10:21 PM    ALKPHOS 141 02/03/2023 10:21 PM    ALT 32 02/03/2023 10:21 PM    AST 34 02/03/2023 10:21 PM    BILITOT 0.3 02/03/2023 10:21 PM    LABALBU 4.1 02/03/2023 10:21 PM    ALBUMIN 2.1 01/12/2023 10:00 AM    LABGLOM >60 02/03/2023 10:21 PM    GFRAA >60 12/07/2021 02:08 PM    GFR      12/07/2021 02:08 PM    GFR NOT REPORTED 12/07/2021 02:08 PM    PROT 7.2 02/03/2023 10:21 PM    CALCIUM 10.2 02/03/2023 10:21 PM     PT/INR:    Lab Results   Component Value Date/Time    PROTIME 10.1 08/31/2016 11:18 AM    INR 1.0 08/31/2016 11:18 AM        Radiology:  CT SOFT TISSUE NECK W CONTRAST    Result Date: 2/4/2023  Acute tonsillitis with left peritonsillar abscess. Consultations:    Consults:     Final Specialist Recommendations/Findings:   IP CONSULT TO OTOLARYNGOLOGY      The patient was seen and examined on day of discharge and this discharge summary is in conjunction with any daily progress note from day of discharge. Discharge plan:     Disposition: Home    Physician Follow Up:      Dede Menendez MD  [address] [address]  [phone]    Schedule an appointment as soon as possible for a visit in 3 day(s)      Matt Hernandez MD  [address] [place] [address]  [phone]    Schedule an appointment as soon as possible for a visit in 1 week(s)         Requiring Further Evaluation/Follow Up POST HOSPITALIZATION/Incidental Findings: follow up ent    Diet: regular diet    Activity: As tolerated    Instructions to Patient: take augmentin 1 4 days    Discharge Medications:      Medication List        START taking these medications      amoxicillin-clavulanate 875-125 MG per tablet  Commonly known as: AUGMENTIN  Take 1 tablet by mouth 2 times daily for 14 days            CHANGE how you take these medications      meloxicam 15 MG tablet  Commonly known as: MOBIC  Take 1 tablet by mouth daily as needed for Pain  What changed:   when to take this  reasons to take this CONTINUE taking these medications      acetaminophen 325 MG tablet  Commonly known as: TYLENOL     pantoprazole 40 MG tablet  Commonly known as: PROTONIX     turmeric 500 MG Caps            STOP taking these medications      cephALEXin 500 MG capsule  Commonly known as: Roya Ang PO               Where to Get Your Medications        These medications were sent to [facility] [place] [address] [facility], [address] [facility] [phone] Rodolfo Sanchez [phone]  [address] [place] [address], [address] [place] [address]      Phone: [phone]   amoxicillin-clavulanate 875-125 MG per tablet  meloxicam 15 MG tablet         No discharge procedures on file. Time Spent on discharge is  33 mins in patient examination, evaluation, counseling as well as medication reconciliation, prescriptions for required medications, discharge plan and follow up. Electronically signed by   Davi Myers MD  2/4/2023  3:10 PM      Thank you Dr. May Banks MD for the opportunity to be involved in this patient's care.

## 2023-02-04 NOTE — ED PROVIDER NOTES
EMERGENCY DEPARTMENT ENCOUNTER    Pt Name: Caleb Barclay  MRN: [id number]  [place] 1965  Date of evaluation: 2/3/23  CHIEF COMPLAINT       Chief Complaint   Patient presents with    Pharyngitis     Having a difficult time swallowing, was seen at urgent care several times this week, they told patient she has a jane-tonsillar abscess. Otalgia     HISTORY OF PRESENT ILLNESS   Patient is a 51-year-old female who presents with a 6-day history of a left-sided sore throat. Patient states that the pain has been gradually increasing. The pain is present all the time but much worse with swallowing. She has had subjective fevers. No nausea or vomiting. Does have a slight headache currently. Patient has been seen at several different urgent cares, did have a negative strep test and initially was diagnosed with otitis media, started on amoxicillin which she has taken for the last several days. REVIEW OF SYSTEMS     Review of Systems   Constitutional:  Negative for chills and fever. HENT:  Positive for sore throat, trouble swallowing and voice change. Negative for ear pain and rhinorrhea. Eyes:  Negative for pain, discharge and itching. Respiratory:  Negative for cough and wheezing. Cardiovascular:  Negative for chest pain and palpitations. Gastrointestinal:  Negative for nausea and vomiting. Genitourinary:  Negative for difficulty urinating and dysuria. Musculoskeletal:  Negative for back pain and myalgias. Skin:  Negative for color change and wound. Neurological:  Negative for dizziness and headaches. Psychiatric/Behavioral:  Negative for dysphoric mood.     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    Melanoma of skin ([address] C43.9    Breast cyst N60.09    Status post total bilateral knee replacement Z96.653    Primary osteoarthritis of both knees M17.0    Hx of peptic ulcer Z87.11    Low back pain with right-sided sciatica M54.41    Breast mass, right N63.10     SURGICAL HISTORY       Past Surgical History:   Procedure Laterality Date    ENDOSCOPY, COLON, DIAGNOSTIC      EYE SURGERY      lazy eye    HYSTEROSCOPY      JOINT REPLACEMENT      bilat knees    KNEE ARTHROPLASTY Bilateral 2016    SKIN BIOPSY      SKIN CANCER EXCISION      UPPER GASTROINTESTINAL ENDOSCOPY N/A 2021    EGD BIOPSY performed by Deedee Tobar MD at [facility] 2022    EGD BIOPSY performed by Deedee Tobar MD at [address] [address]       Previous Medications    ACETAMINOPHEN (TYLENOL) 325 MG TABLET    Take 650 mg by mouth every 6 hours as needed for Pain    CEPHALEXIN (KEFLEX) 500 MG CAPSULE    Take 1 capsule by mouth 4 times daily 4 capsules by mouth 1hr before dental procedure    LEVOCETIRIZINE DIHYDROCHLORIDE (XYZAL PO)    Take by mouth    MELOXICAM (MOBIC) 15 MG TABLET    Take 15 mg by mouth daily    PANTOPRAZOLE (PROTONIX) 40 MG TABLET        TURMERIC 500 MG CAPS    Take 1,000 mg by mouth daily     ALLERGIES     has No Known Allergies. FAMILY HISTORY     She indicated that her mother is . She indicated that her father is . She indicated that the status of her brother is unknown. She indicated that the status of her maternal grandmother is unknown. She indicated that the status of her maternal grandfather is unknown. She indicated that the status of her paternal grandmother is unknown.  She indicated that the status of her paternal grandfather is unknown. SOCIAL HISTORY       Social History     Tobacco Use    Smoking status: Never    Smokeless tobacco: Never   Vaping Use    Vaping Use: Never used   Substance Use Topics    Alcohol use: Yes     Comment: social    Drug use: No     PHYSICAL EXAM     INITIAL VITALS: BP (!) 140/87   Pulse 90   Temp 99.9 °F (37.7 °C)   Resp 12   Ht 5' 4\" (1.626 m)   Wt 131 lb (59.4 kg)   LMP  (LMP Unknown)   SpO2 98%   BMI 22.49 kg/m²    Physical Exam  Constitutional:       Appearance: She is well-developed. She is not diaphoretic. HENT:      Head: Normocephalic and atraumatic. Right Ear: Tympanic membrane, ear canal and external ear normal.      Left Ear: Tympanic membrane, ear canal and external ear normal.      Mouth/Throat:      Pharynx: Pharyngeal swelling and posterior oropharyngeal erythema present. Tonsils: Tonsillar abscess present. 3+ on the left. Comments: Patient does have a hot potato voice  Eyes:      General: No scleral icterus. Left eye: No discharge. Neck:      Trachea: No tracheal deviation. Cardiovascular:      Rate and Rhythm: Normal rate and regular rhythm. Heart sounds: Normal heart sounds. No murmur heard. No gallop. Pulmonary:      Effort: Pulmonary effort is normal. No respiratory distress. Breath sounds: Normal breath sounds. No stridor. Abdominal:      Tenderness: There is no abdominal tenderness. There is no guarding or rebound. Musculoskeletal:         General: Normal range of motion. Cervical back: Normal range of motion. Lymphadenopathy:      Cervical: Cervical adenopathy present. Right cervical: No superficial or deep cervical adenopathy. Left cervical: Superficial cervical adenopathy and deep cervical adenopathy present. Skin:     General: Skin is warm and dry. Coloration: Skin is not pale. Findings: No rash (on exposed surfaces).    Neurological:      Mental Status: She is alert and oriented to person, place, and time. Coordination: Coordination normal.   Psychiatric:         Behavior: Behavior normal.       MEDICAL DECISION MAKING / ED COURSE:   Summary of Patient Presentation:    Patient is a 80-year-old female who presents with a 6-day history of left-sided sore throat. Has been taking amoxicillin for an ear infection with a worsening sore throat only on the left side and worsening difficulty swallowing with subjective fever. On examination patient does have asymmetry to the left side consistent with an abscess. 1)  Number and Complexity of Problems  Problem List This Visit: Sore throat, difficulty swallowing    Differential Diagnosis: Peritonsillar abscess, retropharyngeal abscess    Diagnoses Considered but Do Not Suspect: Viral illness    Pertinent Comorbid Conditions: None        Imaging that is independently reviewed and interpreted by me as: CT scan of the soft tissue neck does show asymmetry and swelling around the tonsil on the left    See more data below for the lab and radiology tests and orders. 3)  Treatment and Disposition    Patient repeat assessment: On repeat examination patient is feeling a little bit of pain relief    Disposition discussion with patient/family: Discussed the CT results with the patient and her , as we do not have otolaryngology coverage at [facility] would recommend transfer to higher level of care with specialist availability    Case discussed with consulting clinician:  Dr. Sherie Espino Course\" Notes From Epic Narrator:  ED Course as of 02/04/23 0152   Sat Feb 04, 2023   0038 [address] the CT results with the patient. She would like to be transferred to [place] [address]. Her pain is well controlled at this time. [LORA]   Curt Spoke with dr. Geovanny Noonan otolaryngologist, via access.   He is in agreement with transfer, does not admit primarily [LORA]   0152 Patient signed out to attending pending transfer   [LORA] ED Course User Index  [LORA] Brain MOIZ Pardo         CRITICAL CARE:             DATA FOR LAB AND RADIOLOGY TESTS ORDERED BELOW ARE REVIEWED BY THE ED CLINICIAN:    RADIOLOGY: All x-rays, CT, MRI, and formal ultrasound images (except ED bedside ultrasound) are read by the radiologist, see reports below, unless otherwise noted in MDM or here. Reports below are reviewed by myself. CT SOFT TISSUE NECK W CONTRAST   Final Result   Acute tonsillitis with left peritonsillar abscess. LABS: Lab orders shown below, the results are reviewed by myself, and all abnormals are listed below. Labs Reviewed   CBC WITH AUTO DIFFERENTIAL - Abnormal; Notable for the following components:       Result Value    Seg Neutrophils 79 (*)     Lymphocytes 12 (*)     Absolute Lymph # 0.98 (*)     All other components within normal limits   COMPREHENSIVE METABOLIC PANEL W/ REFLEX TO MG FOR LOW K - Abnormal; Notable for the following components:    Glucose 108 (*)     BUN 23 (*)     Bun/Cre Ratio 32 (*)     Alkaline Phosphatase 141 (*)     AST 34 (*)     All other components within normal limits       Vitals Reviewed:    Vitals:    02/04/23 0114 02/04/23 0124 02/04/23 0134 02/04/23 0144   BP:       Pulse:       Resp:       Temp:       SpO2: 98% 98% 98% 98%   Weight:       Height:         MEDICATIONS GIVEN TO PATIENT THIS ENCOUNTER:  Orders Placed This Encounter   Medications    0.9 % sodium chloride bolus    morphine sulfate (PF) injection 4 mg    ampicillin-sulbactam (UNASYN) 3,000 mg in sodium chloride 0.9 % 100 mL IVPB (mini-bag)     Order Specific Question:   Antimicrobial Indications     Answer:    Other     Order Specific Question:   Other Abx Indication     Answer:   peritonsilar abscess    dexamethasone (PF) (DECADRON) injection 10 mg    0.9 % sodium chloride bolus    sodium chloride flush 0.9 % injection 10 mL    iopamidol (ISOVUE-370) 76 % injection 75 mL     DISCHARGE PRESCRIPTIONS:  New Prescriptions    No medications on file     PHYSICIAN CONSULTS ORDERED THIS ENCOUNTER:  None  FINAL IMPRESSION      1. Acute tonsillitis, unspecified etiology    2. Peritonsillar abscess          DISPOSITION/PLAN   DISPOSITION        OUTPATIENT FOLLOW UP THE PATIENT:  No follow-up provider specified.     [address] [place] [address] [address]  02/04/23 035

## 2023-02-04 NOTE — H&P
[facility]  Office: [address], DO, Joseph Bobo, DO, Terrance Liao, DO, Amber Richmond, DO, Velton Habermann, MD, Moriah Quijano MD, Jaz Cheatham MD, Delfino Wills MD,  Cris Talamantes MD, Kristel Velasquez MD, Perla Corrigan DO, Becky Hendrix MD,  Mari Vargas MD, Pamela Kenney MD, Opal Morgan DO, Tricia Perry MD, Aniya Wei MD, Irvin Harrell DO, Allie Castillo MD, Omero Gray MD, Abe Wakefield MD, Charan Bhat MD, Guillaume Cramer, DO, Iqra Farmer MD, Dc Carrillo MD, Alberto Castellano, CNP,  Jackie Cagle, CNP, Roxy Finley, CNP, Lina Brown, CNP,  Stanislav Osorio, [facility], Komal Wilhelm, CNP, Samir Ndiaye, CNP, Francisco David, CNP, Andreina Love, CNP, Keiko Mejia, CNP, Kenton Phlegm, PA-C, Donna Peterson, CNS, Toni Drew, CNP, Shyam See, On license of [facility]    HISTORY AND PHYSICAL EXAMINATION            Date:   2/4/2023  Patient name:  Edilson Davalos  Date of admission:  2/4/2023  5:23 AM  MRN:   [id number]  Account:  [de-identified]  YOB: 1965  PCP:    Catie Flor MD  Room:   6005/1594-08  Code Status:    Full Code    Chief Complaint:     Throat pain    History Obtained From:     patient, electronic medical record    History of Present Illness:     Edilson Davalos is a 62 y.o. Non- / non  female who presents with throat pain   and is admitted to the hospital for the management of Peritonsillar abscess. 51-year-old female with history of renal stone, GERD with He's esophagus on Protonix , osteoarthritis bilateral knee replacement presents to the hospital with 6-7 day history of left-sided sore throat. Patient pain was increasing. Her voice was changing and patient had trouble swallowing food. Urgent care but was given eyedrops.   As her symptoms did not improve and continued to worsen she called her PCP who advised her to come to the hospital to rule out abscess. Also reported low-grade temperature and chills. She had started amoxicillin at home. Patient was afebrile on arrival, vitals stable. White count 8.4, glucose 100, CT soft tissue showed acute tonsillitis with left peritonsillar abscess. Patient was taken for incision and drainage by ENT on 2/4. He was okay for discharge post procedure patient able to tolerate diet. Past Medical History:     Past Medical History:   Diagnosis Date    Abnormal Pap smear, low grade squamous intraepithelial lesion (LGSIL) 2004    Chronic headaches     Duodenal ulcer     history of ulcer over 1 yr ago (8/2016)    History of renal stone     passed    Melanoma of skin, site unspecified 2008    Malignant melanoma    MTHFR mutation     HETERO    Osteoarthritis of both knees     Pyloric stenosis     repaired as infant    Scoliosis         Past Surgical History:     Past Surgical History:   Procedure Laterality Date    ENDOSCOPY, COLON, DIAGNOSTIC      EYE SURGERY      lazy eye    HYSTEROSCOPY  2008    JOINT REPLACEMENT      bilat knees    KNEE ARTHROPLASTY Bilateral 09/21/2016    SKIN BIOPSY      SKIN CANCER EXCISION  2008    UPPER GASTROINTESTINAL ENDOSCOPY N/A 12/30/2021    EGD BIOPSY performed by Luz Bernstein MD at [address] [place] [address] 5/19/2022    EGD BIOPSY performed by Luz Bernstein MD at [address] [place] [address]        Medications Prior to Admission:     Prior to Admission medications    Medication Sig Start Date End Date Taking?  Authorizing Provider   cephALEXin (KEFLEX) 500 MG capsule Take 1 capsule by mouth 4 times daily 4 capsules by mouth 1hr before dental procedure  Patient not taking: Reported on 2/4/2023 1/10/23   Tracy Florence PA-C   pantoprazole (PROTONIX) 40 MG tablet  4/11/22   Historical Provider, MD   turmeric 500 MG CAPS Take 1,000 mg by mouth daily    Historical Provider, MD   meloxicam (MOBIC) 15 MG tablet Take 15 mg by mouth daily  Patient not taking: Reported on 2023    Historical Provider, MD   Levocetirizine Dihydrochloride (XYZAL PO) Take by mouth  Patient not taking: Reported on 2023    Historical Provider, MD   acetaminophen (TYLENOL) 325 MG tablet Take 650 mg by mouth every 6 hours as needed for Pain    Historical Provider, MD        Allergies:     Patient has no known allergies. Social History:     Tobacco:    reports that she has never smoked. She has never used smokeless tobacco.  Alcohol:      reports current alcohol use. Drug Use:  reports no history of drug use. Family History:     Family History   Problem Relation Age of Onset    Cancer Father         COPD, smoker,     Diabetes Father     Hypertension Father     Brain Cancer Mother         brain tumor,     Thyroid Disease Mother     Diabetes Paternal Grandfather     Diabetes Paternal Grandmother     Osteoporosis Maternal Grandmother     Breast Cancer Maternal Grandmother     Stroke Maternal Grandfather     Arrhythmia Brother        Review of Systems:     Positive and Negative as described in HPI. Review of Systems   Constitutional:  Positive for activity change, appetite change and fatigue. Negative for fever. HENT:  Positive for sinus pressure, sore throat, trouble swallowing and voice change. Eyes:  Negative for discharge and itching. Respiratory:  Negative for cough and shortness of breath. Cardiovascular:  Negative for chest pain and leg swelling. Gastrointestinal:  Negative for abdominal distention, abdominal pain and nausea. Endocrine: Negative for cold intolerance and heat intolerance. Genitourinary:  Negative for difficulty urinating, flank pain and frequency. Musculoskeletal:  Negative for arthralgias and back pain. Allergic/Immunologic: Negative for environmental allergies and food allergies. Neurological:  Negative for dizziness and facial asymmetry. Hematological:  Negative for adenopathy.    Psychiatric/Behavioral:  Negative for agitation and behavioral problems. Physical Exam:   /70   Pulse 69   Temp 98.7 °F (37.1 °C) (Oral)   Resp 17   Ht 5' 4\" (1.626 m)   Wt 131 lb (59.4 kg)   LMP  (LMP Unknown)   SpO2 95%   BMI 22.49 kg/m²   Temp (24hrs), Av.8 °F (37.1 °C), Min:98.1 °F (36.7 °C), Max:99.9 °F (37.7 °C)    No results for input(s): POCGLU in the last 72 hours. No intake or output data in the 24 hours ending 23 0817    Physical Exam  Constitutional:       Appearance: Normal appearance. HENT:      Head: Normocephalic. Right Ear: External ear normal.      Left Ear: External ear normal.      Nose: Nose normal.      Mouth/Throat:      Mouth: Mucous membranes are moist.   Eyes:      Pupils: Pupils are equal, round, and reactive to light. Cardiovascular:      Rate and Rhythm: Normal rate and regular rhythm. Pulses: Normal pulses. Pulmonary:      Effort: Pulmonary effort is normal. No respiratory distress. Breath sounds: No wheezing. Abdominal:      General: Abdomen is flat. Bowel sounds are normal.   Musculoskeletal:         General: Normal range of motion. Cervical back: Normal range of motion. Right lower leg: No edema. Left lower leg: No edema. Skin:     General: Skin is warm. Neurological:      General: No focal deficit present. Mental Status: She is alert and oriented to person, place, and time. Psychiatric:         Mood and Affect: Mood normal.     Patient examined post drainage, minimal erythema seen on left side. Uvula not deviated.     Investigations:      Laboratory Testing:  Recent Results (from the past 24 hour(s))   CBC with Auto Differential    Collection Time: 23 10:21 PM   Result Value Ref Range    WBC 8.4 3.5 - 11.3 k/uL    RBC 4.76 3.95 - 5.11 m/uL    Hemoglobin 13.4 11.9 - 15.1 g/dL    Hematocrit 41.6 36.3 - 47.1 %    MCV 87.4 82.6 - 102.9 fL    MCH 28.2 25.2 - 33.5 pg    MCHC 32.2 28.4 - 34.8 g/dL    RDW 12.6 11.8 - 14.4 %    Platelets 402 885 - 187 k/uL    MPV 9.6 8.1 - 13.5 fL    NRBC Automated 0.0 0.0 per 100 WBC    Seg Neutrophils 79 (H) 36 - 65 %    Lymphocytes 12 (L) 24 - 43 %    Monocytes 8 3 - 12 %    Eosinophils % 1 1 - 4 %    Basophils 0 0 - 2 %    Immature Granulocytes 0 0 %    Segs Absolute 6.64 1.50 - 8.10 k/uL    Absolute Lymph # 0.98 (L) 1.10 - 3.70 k/uL    Absolute Mono # 0.63 0.10 - 1.20 k/uL    Absolute Eos # 0.07 0.00 - 0.44 k/uL    Basophils Absolute 0.03 0.00 - 0.20 k/uL    Absolute Immature Granulocyte 0.03 0.00 - 0.30 k/uL   Comprehensive Metabolic Panel w/ Reflex to MG    Collection Time: 02/03/23 10:21 PM   Result Value Ref Range    Glucose 108 (H) 70 - 99 mg/dL    BUN 23 (H) 6 - 20 mg/dL    Creatinine 0.71 0.50 - 0.90 mg/dL    Est, Glom Filt Rate >60 >60 mL/min/1.73m2    Bun/Cre Ratio 32 (H) 9 - 20    Calcium 10.2 8.6 - 10.4 mg/dL    Sodium 138 135 - 144 mmol/L    Potassium 3.9 3.7 - 5.3 mmol/L    Chloride 100 98 - 107 mmol/L    CO2 26 20 - 31 mmol/L    Anion Gap 12 9 - 17 mmol/L    Alkaline Phosphatase 141 (H) 35 - 104 U/L    ALT 32 5 - 33 U/L    AST 34 (H) <32 U/L    Total Bilirubin 0.3 0.3 - 1.2 mg/dL    Total Protein 7.2 6.4 - 8.3 g/dL    Albumin 4.1 3.5 - 5.2 g/dL       Imaging/Diagnostics:  CT SOFT TISSUE NECK W CONTRAST    Result Date: 2/4/2023  Acute tonsillitis with left peritonsillar abscess.        Assessment :      Hospital Problems             Last Modified POA    * (Principal) Peritonsillar abscess 2/4/2023 Yes       Plan:     Patient status inpatient in the Med/Surge    Left peritonsillar abscess  S/p I&D today  ENT okay for discharge once patient able to tolerate diet  Continue Unasyn in hospital, plan for Augmentin on discharge  Continue home medications  GERD on Protonix-resume on discharge      Consultations:   [address] [employer]     Patient is admitted as inpatient status because of co-morbidities listed above, severity of signs and symptoms as outlined, requirement for current medical therapies and most importantly because of direct risk to patient if care not provided in a hospital setting. Expected length of stay > 48 hours.     Blanche Millan MD  2/4/2023  8:17 AM    Copy sent to Dr. lOeg Boyle MD

## 2023-02-04 NOTE — ED PROVIDER NOTES
[facility] ED  EMERGENCY DEPARTMENT ENCOUNTER   ATTENDING ATTESTATION     Pt Name: Kristina Peabody  MRN: [id number]  [place] 1965  Date of evaluation: 2/4/23       Kristina Peabody is a 62 y.o. female who presents with Pharyngitis (Having a difficult time swallowing, was seen at urgent care several times this week, they told patient she has a jane-tonsillar abscess. ) and Otalgia      MDM:     63-year-old female presents with complaints of sore throat and difficulty swallowing, patient has evidence of a peritonsillar abscess, the patient was discussed with the hospitalist group at [facility], they agree with admission and transfer. Impression: Peritonsillar abscess    Vitals:   Vitals:    02/04/23 0114 02/04/23 0124 02/04/23 0134 02/04/23 0144   BP:       Pulse:       Resp:       Temp:       SpO2: 98% 98% 98% 98%   Weight:       Height: This visit was performed by both a physician and an APC. I personally evaluated and examined the patient.  I performed all aspects of the MDM as documented     Crescencio Whitehead MD  Attending Emergency  Physician                  Braulio Irby MD  02/04/23 0056

## 2023-02-04 NOTE — OP NOTE
Operative Note      OPERATIVE REPORT    PATIENT NAME: Balwinder Tyler    MRN#: [id number]    : 1965    DATE OF SURGERY: 2023    Service: Otolaryngology    Surgeon(s) and Role:     * Miguel Jensen MD - Primary      Assistant: none    Anesthesiologist: Vianney Newman MD  CRNA: DENISE Romero - CRNA     PREOPERATIVE  SUSPECTED LEFT PERITONSILLAR ABSCESS VS. PHLEGMON    POSTOPERATIVE DIAGNOSIS  LEFT PERITONSILLAR PHLEGMON WITH EARLY ABSCESS FORMATION    PROCEDURE  LEFT TONSILLAR INCISION AND DRAINAGE, Left     Anesthesia Type:   General Endotracheal    Complications:  None    Estimated Blood Loss:   None    Pathologic Specimen:   None    INDICATIONS AND CONSENT  The patient was seen and evaluated by the Pediatric Otolaryngology practice. After history and physical examination, recommendations were made to proceed to the operating room for the above listed procedures. Indications, risks and benefits were discussed with the patient's guardian, who agreed to proceed and signed proper informed consent. DESCRIPTION OF PROCEDURE:  The patient was taken to the operating room and laid supine on the operating room table. General endotracheal anesthesia was administered by the anesthesia team. Proper surgeon-initiated time-out was performed. Once an adequate level of anesthesia was achieved, the table was rotated 90 degrees. A shoulder roll and head wrap were placed. A Jenny-Roberto mouth gag was inserted atraumatically into the oral cavity, opened and suspended from the Schaeffer stand. The oral commissures and lips were covered with saline soaked RayTecs. Inspection of the hard and soft palate demonstrated no evidence of submucous cleft palate or bifid uvula. The FIO2 was turned down to less than 30%     The tonsils and soft palate were examined and demonstrated fullness and exophytic L tonsil.  An incision was made with the Bovie cautery over the left superior lateral tonsil arch where there was maximal fullness. A hemostat was used to open the abscess cavity that was 5 mm below the mucosa of the tonsillar arch region and scant purulence were expressed. I entered and explored the peritonsillar cavity bluntly with a tonsil forcep both inferiorly and superiorly and a large cavity was identified. A Ditmar suction was used to suction the cavity. The phlegmon/abscess cavity was irrigated with normal saline. This was suctioned away. The Jenny-Roberto mouth gag was closed for several moments. It was then reopened and there was no further bleeding. The Jenny-Roberto mouth gag was removed, oral airway was placed and the patient's care was turned back over to anesthesia, and was transported to PACU in stable condition. I was present for and actively involved throughout the entire duration of this procedure.       Frida Clancy MD    Pediatric Otolaryngology-[facility] [address] [facility]

## 2023-02-04 NOTE — PROGRESS NOTES
Writer reviewed discharge instructions and medications. Meds to bed picked up with education. Patient d/c home with self care and all personal belongings ambulating into private vehicle.

## 2023-02-04 NOTE — PROGRESS NOTES
Comprehensive Nutrition Assessment    Type and Reason for Visit:  Positive Nutrition Screen    Nutrition Recommendations/Plan:   Start Standard High Calorie/High Protein oral nutrition supplement Qd to aid in meeting nutrition needs. Continue to monitor weights, PO tolerance, labs, and follow up. Malnutrition Assessment:  Malnutrition Status:  Mild malnutrition (02/04/23 2825)    Context:  Acute Illness     Findings of the 6 clinical characteristics of malnutrition:  Energy Intake:  50% or less of estimated energy requirements for 5 or more days  Weight Loss:  Unable to assess     Body Fat Loss:  Mild body fat loss Orbital   Muscle Mass Loss:  Mild muscle mass loss Clavicles (pectoralis & deltoids), Temples (temporalis)  Fluid Accumulation:  No significant fluid accumulation     Strength:  Not Performed    Nutrition Assessment:    Chart reviewed for a positive nutrition screen related to decreased appetite/weight loss prior to admission. Patient admitted related to peritonsillar abscess. Left tolsillar incision and drainage completed today. Observed patient sitting in bed, very upset upon entrance. Patient reported that she has been waiting for over two hours to receive a tray after her diet advanced to easy to chew texture. Patient had a pudding and jello cup on bedside table. Phone call made to [facility] regarding situation, who reported that they would be in to visit with patient to obtain food preference shortly. Unable to evaluate for significant weight loss from patient, appearance is thin. Poor intakes prior to admission due difficulty swallowing from abscess.     Nutrition Related Findings:    Labs/Weights reviewed Wound Type: None       Current Nutrition Intake & Therapies:    Average Meal Intake: Unable to assess  Average Supplements Intake: None Ordered  ADULT DIET; Easy to Chew    Anthropometric Measures:  Height: 5' 4\" (162.6 cm)  Ideal Body Weight (IBW): 120 lbs (55 kg)    Admission Body Weight: 131 lb (59.4 kg)  Current Body Weight: 131 lb (59.4 kg),   IBW. Weight Source: Stated  Current BMI (kg/m2): 22.5  Weight Adjustment For: No Adjustment  BMI Categories: Normal Weight (BMI 18.5-24. 9)    Estimated Daily Nutrient Needs:  Energy Requirements Based On: Kcal/kg  Weight Used for Energy Requirements: Current  Energy (kcal/day): 1500-1800kcal/day  Weight Used for Protein Requirements: Current  Protein (g/day): 70-90gmsPRO/day  Fluid (ml/day): Per MD    Nutrition Diagnosis:   Inadequate oral intake related to swallowing difficulty as evidenced by poor intake prior to admission    Nutrition Interventions:   Food and/or Nutrient Delivery: Continue Current Diet, Start Oral Nutrition Supplement  Nutrition Education/Counseling: No recommendation at this time  Coordination of Nutrition Care: Continue to monitor while inpatient  Plan of Care discussed with: Patient    Goals:     Goals: Meet at least 75% of estimated needs, within 7 days, PO intake 50% or greater       Nutrition Monitoring and Evaluation:   Behavioral-Environmental Outcomes: None Identified  Food/Nutrient Intake Outcomes: Food and Nutrient Intake, Supplement Intake  Physical Signs/Symptoms Outcomes: Biochemical Data, Weight, Nutrition Focused Physical Findings, Chewing or Swallowing    Discharge Planning:     Too soon to determine     Poppy Figueroa RD  Contact: [phone]

## 2023-06-27 ENCOUNTER — OFFICE VISIT (OUTPATIENT)
Dept: OBGYN CLINIC | Age: 58
End: 2023-06-27
Payer: COMMERCIAL

## 2023-06-27 VITALS
DIASTOLIC BLOOD PRESSURE: 74 MMHG | SYSTOLIC BLOOD PRESSURE: 110 MMHG | WEIGHT: 141 LBS | BODY MASS INDEX: 24.07 KG/M2 | HEIGHT: 64 IN

## 2023-06-27 DIAGNOSIS — Z12.31 BREAST CANCER SCREENING BY MAMMOGRAM: ICD-10-CM

## 2023-06-27 DIAGNOSIS — Z01.419 ENCOUNTER FOR ANNUAL ROUTINE GYNECOLOGICAL EXAMINATION: Primary | ICD-10-CM

## 2023-06-27 PROCEDURE — 99396 PREV VISIT EST AGE 40-64: CPT | Performed by: CLINICAL NURSE SPECIALIST

## 2023-06-27 RX ORDER — PANTOPRAZOLE SODIUM 40 MG/1
TABLET, DELAYED RELEASE ORAL
COMMUNITY
Start: 2022-01-03

## 2023-07-20 ENCOUNTER — TELEPHONE (OUTPATIENT)
Dept: ORTHOPEDIC SURGERY | Age: 58
End: 2023-07-20

## 2023-07-20 NOTE — TELEPHONE ENCOUNTER
Patient had both  knees replaced by Dr. Karie Shook, she has seen Veronica Roque in the past.  She needs a antibotic for an upcoming dental procedure sent to [facility] at [address] [facility].  Call back [phone]

## 2023-07-21 DIAGNOSIS — Z96.653 HISTORY OF BILATERAL KNEE REPLACEMENT: Primary | ICD-10-CM

## 2023-07-21 RX ORDER — AMOXICILLIN 500 MG/1
TABLET, FILM COATED ORAL
Qty: 4 TABLET | Refills: 0 | Status: SHIPPED | OUTPATIENT
Start: 2023-07-21

## 2023-07-21 NOTE — TELEPHONE ENCOUNTER
Sara Epstein from [address] [place] [address] left message on answering machine, he would like a call back, he has questions regarding the antibiotic. [phone]

## 2023-08-05 ENCOUNTER — PATIENT MESSAGE (OUTPATIENT)
Dept: ORTHOPEDIC SURGERY | Age: 58
End: 2023-08-05

## 2023-08-05 DIAGNOSIS — M21.70 LEG LENGTH DISCREPANCY: ICD-10-CM

## 2023-08-05 DIAGNOSIS — Z96.653 HISTORY OF BILATERAL KNEE REPLACEMENT: Primary | ICD-10-CM

## 2023-08-08 NOTE — TELEPHONE ENCOUNTER
Patient called yesterday in regard to having a referral placed for her for PT to see Juan J here at the [facility]. Order has been placed for the patient to come into the [facility].

## 2023-08-08 NOTE — TELEPHONE ENCOUNTER
From: Reg Harris  To: Doni Founds  Sent: 8/5/2023 10:56 AM EDT  Subject: My Left leg    I have been having some pain when walking x 2 weeks in my left lower leg below my knee implant. One of the OP PT's here working this weekend worked on me, and confirmed I have a leg length discrepancy which is altering my gait, and contributing to my problem. He did some work on me here but recommended I see Alicja Knox, PT at your location to continue to work on correcting leg length & muscle imbalances & flexibility. I need a referral to see him so didn't know if you could give an order or you need to see me in the clinic first. Please advise.   Thank You!!

## 2023-08-21 ENCOUNTER — HOSPITAL ENCOUNTER (OUTPATIENT)
Dept: PHYSICAL THERAPY | Facility: CLINIC | Age: 58
Setting detail: THERAPIES SERIES
Discharge: HOME | End: 2023-08-21
Payer: COMMERCIAL

## 2023-08-21 PROCEDURE — 97110 THERAPEUTIC EXERCISES: CPT

## 2023-08-21 PROCEDURE — 97161 PT EVAL LOW COMPLEX 20 MIN: CPT

## 2023-08-21 NOTE — CONSULTS
[x] [facility]  Outpatient Rehabilitation &  Therapy  [address]   [address]   [place]: [phone]  F: [phone]      Physical Therapy Lower Extremity Evaluation    Date:  2023  Patient: Bailee Parker : 1965  MRN: [id number]  Physician: Moshe Boeck [facility] Insurance: [employer] (VERIFICATION PENDING)  Medical Diagnosis: Bilat TKA, leg length discrepency    Rehab Codes: N01.792, M21.70, M62.81 (Muscle Weakness), M62.9 (Disorder of Muscle), M79.1   Onset date: 2023   Next 's appt. : -        Subjective:   CC/HPI: Pt is a 62year old female with bilat TKA, reports with pain in both knees. Pain is in the left calf, medial joint line. Feels she is walking on her lateral feet. Feels her gait is off and pain is worse when she walks in the am. Feels she is shifting to the left when she walks. No pain in the right leg noted. She is able to do normal squats, mobility with exercises. Pain is focused with gait. She saw Gato Pino, PT and he thought she might have a leg length issue and recommended PT. She saw A chiropractor 3 weeks ago, adjustment for leg length and only seen once. PMHx: [x] Refer to full medical chart in [facility]   [] Unremarkable [] Diabetes [] HTN  [] Pacemaker   [] MI/Heart Problems [] Cancer [] Arthritis   [] Other:                    Radiology: Date Performed: Results:     [x] X-RAY 2023 Radiology:   3 x-ray views of the left knee were obtained including standing AP, merchant and lateral view. There is a DePuy attune total knee arthroplasty present. There is cystic changes of the tibial metaphysis. The loosening is nonprogressive compared to previous films on 10/6/2022. Impression: left total knee arthroplasty with loosening not progressing      3 x-ray views of the right knee including standing AP, merchant and lateral.  There is a known total knee arthroplasty present.   There is loosening of the femoral component that has not changed

## 2023-08-31 ENCOUNTER — HOSPITAL ENCOUNTER (OUTPATIENT)
Dept: PHYSICAL THERAPY | Facility: CLINIC | Age: 58
Setting detail: THERAPIES SERIES
Discharge: HOME | End: 2023-08-31
Payer: COMMERCIAL

## 2023-08-31 PROCEDURE — 97140 MANUAL THERAPY 1/> REGIONS: CPT

## 2023-08-31 NOTE — FLOWSHEET NOTE
[x] [facility] &  Therapy  [address]   [address]   [place]: [phone]  F: [phone]      Physical Therapy Daily Treatment Note    Date:  2023  PPatient: Esther RODRIGUEZ     : 1965                        MRN: [id number]  Physician: Cookie Dumont [facility]        Insurance: [employer] (VERIFICATION PENDING)  Medical Diagnosis: Bilat TKA, leg length discrepency                      Rehab Codes: Z83.802, M21.70, M62.81 (Muscle Weakness), M62.9 (Disorder of Muscle), M79.1   Onset date: 2023                            Next 's appt. : -  Visit# / total visits:      Cancels/No Shows: 0/0        Subjective:    Pain:  [x] Yes  [] No Location: LLE knee   Pain Rating: (0-10 scale) 2/10  Pain altered Tx:  [] No  [] Yes  Action:  Comments: Pt reports with mild pain in bilat knees, soreness noted the last few weeks since she has been here. Keeping up with her HEP.          Objective:  Precautions: Standard   Exercise       Reps/ Time Weight/ Level Comments             Hip flexor kneel         Hip flexor supine         HS belt          Calf Inv belt                                                                                                                    Myofascial Restrictions  Location  R/L Treatment  Tools Notes   Lower Crossed     [x] Psoas   [x] Iliacus [] Right  [x] Left [x] Direct  [] Indirect [x] Hands-On  [] IASTM  [] Hypervolt       [x] Piriformis [] Right  [x] Left [x] Direct  [] Indirect [x] Hands-On  [] IASTM  [] Hypervolt       [x] Coccygeus  [] Levator Ani    [] Right  [x] Left [x] Direct  [] Indirect [x] Hands-On  [] IASTM  [] Hypervolt       [x] Hamstring [] Right  [x] Left [x] Direct  [] Indirect [x] Hands-On  [] IASTM  [] Hypervolt       [x] gracilis  [] Right  [x] Left [x] Direct  [] Indirect [x] Hands-On  [] IASTM  [] Hypervolt       [x] Gastrocnemius [] Right  [x] Left [x] Direct  [] Indirect [x] Hands-On  [] IASTM  [] Hypervolt

## 2023-09-07 ENCOUNTER — HOSPITAL ENCOUNTER (OUTPATIENT)
Dept: MAMMOGRAPHY | Age: 58
Discharge: HOME | End: 2023-09-09
Payer: COMMERCIAL

## 2023-09-07 DIAGNOSIS — Z12.31 BREAST CANCER SCREENING BY MAMMOGRAM: ICD-10-CM

## 2023-09-07 PROCEDURE — 77063 BREAST TOMOSYNTHESIS BI: CPT

## 2023-10-05 ENCOUNTER — OFFICE VISIT (OUTPATIENT)
Dept: ORTHOPEDIC SURGERY | Age: 58
End: 2023-10-05
Payer: COMMERCIAL

## 2023-10-05 VITALS — WEIGHT: 140 LBS | RESPIRATION RATE: 14 BRPM | BODY MASS INDEX: 23.9 KG/M2 | HEIGHT: 64 IN

## 2023-10-05 DIAGNOSIS — S86.812A STRAIN OF CALF MUSCLE, LEFT, INITIAL ENCOUNTER: ICD-10-CM

## 2023-10-05 DIAGNOSIS — M70.50 PES ANSERINE BURSITIS: ICD-10-CM

## 2023-10-05 DIAGNOSIS — S76.312A HAMSTRING STRAIN, LEFT, INITIAL ENCOUNTER: Primary | ICD-10-CM

## 2023-10-05 PROCEDURE — 99213 OFFICE O/P EST LOW 20 MIN: CPT | Performed by: PHYSICIAN ASSISTANT

## 2023-10-05 ASSESSMENT — ENCOUNTER SYMPTOMS
ABDOMINAL PAIN: 0
COLOR CHANGE: 0
CHEST TIGHTNESS: 0
RESPIRATORY NEGATIVE: 1
ABDOMINAL DISTENTION: 0
VOMITING: 0
COUGH: 0
NAUSEA: 0
SHORTNESS OF BREATH: 0
APNEA: 0
CONSTIPATION: 0
DIARRHEA: 0

## 2023-10-05 NOTE — PROGRESS NOTES
bursitis after a total knee arthroplasty. She also has tight hamstrings and calf. We discussed the various treatment alternatives including anti-inflammatory medications, physical therapy, injections, further imaging studies and a last resort surgery. I discussed that she does have tight hamstrings so I do think massage and stretching would be very helpful. She feels she can do these on her own. And with the help of her massage therapist.  She can use Voltaren gel over the area of the pes anserine bursa if it gets irritated again. Today she does not feel like it is necessary to do a cortisone injection which is also an option but she states is not painful enough to do that. The patient will follow-up as needed or if the pain increases for a cortisone injection of the Pes anserine bursa. She will call if she has any questions or concerns. Follow up:Return if symptoms worsen or fail to improve. No orders of the defined types were placed in this encounter. No orders of the defined types were placed in this encounter. This note is created with the assistance of a speech recognition program.  While intending to generate a document that actually reflects the content of the visit, the document can still have some errors including those of syntax and sound a like substitutions which may escape proof reading. In such instances, actual meaning can be extrapolated by contextual diversion.      Electronically signed by Doni Cortes PA-C on 10/5/2023 at 8:24 AM

## 2023-12-01 ENCOUNTER — HOSPITAL ENCOUNTER (OUTPATIENT)
Age: 58
Discharge: HOME | End: 2023-12-01
Payer: COMMERCIAL

## 2023-12-01 LAB
25(OH)D3 SERPL-MCNC: 30.1 NG/ML (ref 30–100)
ALBUMIN SERPL-MCNC: 4.8 G/DL (ref 3.5–5.2)
ALBUMIN/GLOB SERPL: 2 {RATIO} (ref 1–2.5)
ALP SERPL-CCNC: 116 U/L (ref 35–104)
ALT SERPL-CCNC: 21 U/L (ref 10–35)
ANION GAP SERPL CALCULATED.3IONS-SCNC: 11 MMOL/L (ref 9–16)
AST SERPL-CCNC: 31 U/L (ref 10–35)
BILIRUB SERPL-MCNC: 0.4 MG/DL (ref 0–1.2)
BUN SERPL-MCNC: 26 MG/DL (ref 6–20)
CALCIUM SERPL-MCNC: 10.6 MG/DL (ref 8.6–10.4)
CHLORIDE SERPL-SCNC: 106 MMOL/L (ref 98–107)
CHOLEST SERPL-MCNC: 215 MG/DL (ref 0–199)
CHOLESTEROL/HDL RATIO: 3
CO2 SERPL-SCNC: 24 MMOL/L (ref 20–31)
CREAT SERPL-MCNC: 1 MG/DL (ref 0.5–0.9)
ERYTHROCYTE [DISTWIDTH] IN BLOOD BY AUTOMATED COUNT: 13 % (ref 11.8–14.4)
EST. AVERAGE GLUCOSE BLD GHB EST-MCNC: 117 MG/DL
FOLATE SERPL-MCNC: 8.7 NG/ML (ref 4.8–24.2)
GFR SERPL CREATININE-BSD FRML MDRD: >60 ML/MIN/1.73M2
GLUCOSE SERPL-MCNC: 97 MG/DL (ref 74–99)
HBA1C MFR BLD: 5.7 % (ref 4–6)
HCT VFR BLD AUTO: 45.7 % (ref 36.3–47.1)
HDLC SERPL-MCNC: 69 MG/DL
HGB BLD-MCNC: 14.9 G/DL (ref 11.9–15.1)
LDLC SERPL CALC-MCNC: 137 MG/DL (ref 0–100)
MAGNESIUM SERPL-MCNC: 2.3 MG/DL (ref 1.6–2.6)
MCH RBC QN AUTO: 28.9 PG (ref 25.2–33.5)
MCHC RBC AUTO-ENTMCNC: 32.6 G/DL (ref 28.4–34.8)
MCV RBC AUTO: 88.6 FL (ref 82.6–102.9)
NRBC BLD-RTO: 0 PER 100 WBC
PLATELET # BLD AUTO: 287 K/UL (ref 138–453)
PMV BLD AUTO: 9.9 FL (ref 8.1–13.5)
POTASSIUM SERPL-SCNC: 4.2 MMOL/L (ref 3.7–5.3)
PROT SERPL-MCNC: 7.1 G/DL (ref 6.6–8.7)
RBC # BLD AUTO: 5.16 M/UL (ref 3.95–5.11)
SODIUM SERPL-SCNC: 141 MMOL/L (ref 136–145)
TRIGL SERPL-MCNC: 46 MG/DL (ref 0–149)
TSH SERPL DL<=0.05 MIU/L-ACNC: 2.72 UIU/ML (ref 0.27–4.2)
VIT B12 SERPL-MCNC: 1400 PG/ML (ref 232–1245)
VLDLC SERPL CALC-MCNC: 9 MG/DL
WBC OTHER # BLD: 6.6 K/UL (ref 3.5–11.3)

## 2023-12-01 PROCEDURE — 84443 ASSAY THYROID STIM HORMONE: CPT

## 2023-12-01 PROCEDURE — 82306 VITAMIN D 25 HYDROXY: CPT

## 2023-12-01 PROCEDURE — 82746 ASSAY OF FOLIC ACID SERUM: CPT

## 2023-12-01 PROCEDURE — 80061 LIPID PANEL: CPT

## 2023-12-01 PROCEDURE — 80053 COMPREHEN METABOLIC PANEL: CPT

## 2023-12-01 PROCEDURE — 82607 VITAMIN B-12: CPT

## 2023-12-01 PROCEDURE — 83735 ASSAY OF MAGNESIUM: CPT

## 2023-12-01 PROCEDURE — 83036 HEMOGLOBIN GLYCOSYLATED A1C: CPT

## 2023-12-01 PROCEDURE — 36415 COLL VENOUS BLD VENIPUNCTURE: CPT

## 2023-12-01 PROCEDURE — 85027 COMPLETE CBC AUTOMATED: CPT

## 2024-04-11 ENCOUNTER — HOSPITAL ENCOUNTER (OUTPATIENT)
Dept: NEUROLOGY | Age: 59
Discharge: HOME | End: 2024-04-11

## 2024-04-11 ENCOUNTER — OFFICE VISIT (OUTPATIENT)
Dept: ORTHOPEDIC SURGERY | Age: 59
End: 2024-04-11
Payer: COMMERCIAL

## 2024-04-11 VITALS — WEIGHT: 140.6 LBS | RESPIRATION RATE: 16 BRPM | OXYGEN SATURATION: 98 % | BODY MASS INDEX: 24.01 KG/M2 | HEIGHT: 64 IN

## 2024-04-11 DIAGNOSIS — T84.032D MECHANICAL LOOSENING OF INTERNAL RIGHT KNEE PROSTHETIC JOINT, SUBSEQUENT ENCOUNTER: ICD-10-CM

## 2024-04-11 DIAGNOSIS — Z96.653 HISTORY OF BILATERAL KNEE REPLACEMENT: Primary | ICD-10-CM

## 2024-04-11 DIAGNOSIS — T84.033D MECHANICAL LOOSENING OF INTERNAL LEFT KNEE PROSTHETIC JOINT, SUBSEQUENT ENCOUNTER: ICD-10-CM

## 2024-04-11 PROCEDURE — 99213 OFFICE O/P EST LOW 20 MIN: CPT | Performed by: PHYSICIAN ASSISTANT

## 2024-04-11 ASSESSMENT — ENCOUNTER SYMPTOMS
COUGH: 0
COLOR CHANGE: 0
ABDOMINAL PAIN: 0
NAUSEA: 0
GASTROINTESTINAL NEGATIVE: 1
SHORTNESS OF BREATH: 0
DIARRHEA: 0
APNEA: 0
RESPIRATORY NEGATIVE: 1
VOMITING: 0
ABDOMINAL DISTENTION: 0
CONSTIPATION: 0
CHEST TIGHTNESS: 0

## 2024-04-11 NOTE — PROGRESS NOTES
watch and see if she has any increase in pain.  Today the patient opted for continuing to do her exercises and follow-up in 6 months with pre-clinic x-rays or sooner if she has increased pain.     Follow up:Return in about 6 months (around 10/11/2024).          No orders of the defined types were placed in this encounter.        No orders of the defined types were placed in this encounter.      This note is created with the assistance of a speech recognition program.  While intending to generate a document that actually reflects the content of the visit, the document can still have some errors including those of syntax and sound a like substitutions which may escape proof reading.  In such instances, actual meaning can be extrapolated by contextual diversion.     Electronically signed by Barbara Durbin PA-C on 4/11/2024 at 8:46 AM

## 2024-05-17 ENCOUNTER — HOSPITAL ENCOUNTER (OUTPATIENT)
Age: 59
Discharge: HOME | End: 2024-05-17
Payer: COMMERCIAL

## 2024-05-17 LAB
ALBUMIN SERPL-MCNC: 4.8 G/DL (ref 3.5–5.2)
ALBUMIN/GLOB SERPL: 3 {RATIO} (ref 1–2.5)
ALP SERPL-CCNC: 86 U/L (ref 35–104)
ALT SERPL-CCNC: 22 U/L (ref 10–35)
ANION GAP SERPL CALCULATED.3IONS-SCNC: 8 MMOL/L (ref 9–16)
AST SERPL-CCNC: 30 U/L (ref 10–35)
BASOPHILS # BLD: 0.04 K/UL (ref 0–0.2)
BASOPHILS NFR BLD: 1 % (ref 0–2)
BILIRUB SERPL-MCNC: 0.4 MG/DL (ref 0–1.2)
BUN SERPL-MCNC: 28 MG/DL (ref 6–20)
CALCIUM SERPL-MCNC: 10.6 MG/DL (ref 8.6–10.4)
CHLORIDE SERPL-SCNC: 108 MMOL/L (ref 98–107)
CO2 SERPL-SCNC: 28 MMOL/L (ref 20–31)
CREAT SERPL-MCNC: 1 MG/DL (ref 0.5–0.9)
EOSINOPHIL # BLD: 0.26 K/UL (ref 0–0.44)
EOSINOPHILS RELATIVE PERCENT: 5 % (ref 1–4)
ERYTHROCYTE [DISTWIDTH] IN BLOOD BY AUTOMATED COUNT: 13.7 % (ref 11.8–14.4)
GFR, ESTIMATED: 62 ML/MIN/1.73M2
GLUCOSE SERPL-MCNC: 91 MG/DL (ref 74–99)
HCT VFR BLD AUTO: 43.8 % (ref 36.3–47.1)
HGB BLD-MCNC: 14.1 G/DL (ref 11.9–15.1)
IMM GRANULOCYTES # BLD AUTO: <0.03 K/UL (ref 0–0.3)
IMM GRANULOCYTES NFR BLD: 0 %
LYMPHOCYTES NFR BLD: 2.17 K/UL (ref 1.1–3.7)
LYMPHOCYTES RELATIVE PERCENT: 42 % (ref 24–43)
MCH RBC QN AUTO: 28.5 PG (ref 25.2–33.5)
MCHC RBC AUTO-ENTMCNC: 32.2 G/DL (ref 28.4–34.8)
MCV RBC AUTO: 88.5 FL (ref 82.6–102.9)
MONOCYTES NFR BLD: 0.36 K/UL (ref 0.1–1.2)
MONOCYTES NFR BLD: 7 % (ref 3–12)
NEUTROPHILS NFR BLD: 45 % (ref 36–65)
NEUTS SEG NFR BLD: 2.31 K/UL (ref 1.5–8.1)
NRBC BLD-RTO: 0 PER 100 WBC
PLATELET # BLD AUTO: 253 K/UL (ref 138–453)
PMV BLD AUTO: 10.3 FL (ref 8.1–13.5)
POTASSIUM SERPL-SCNC: 4.3 MMOL/L (ref 3.7–5.3)
PROT SERPL-MCNC: 6.6 G/DL (ref 6.6–8.7)
RBC # BLD AUTO: 4.95 M/UL (ref 3.95–5.11)
SODIUM SERPL-SCNC: 144 MMOL/L (ref 136–145)
WBC OTHER # BLD: 5.2 K/UL (ref 3.5–11.3)

## 2024-05-17 PROCEDURE — 36415 COLL VENOUS BLD VENIPUNCTURE: CPT

## 2024-05-17 PROCEDURE — 86038 ANTINUCLEAR ANTIBODIES: CPT

## 2024-05-17 PROCEDURE — 86225 DNA ANTIBODY NATIVE: CPT

## 2024-05-17 PROCEDURE — 80053 COMPREHEN METABOLIC PANEL: CPT

## 2024-05-17 PROCEDURE — 85025 COMPLETE CBC W/AUTO DIFF WBC: CPT

## 2024-05-21 LAB
ANA SER QL IA: NEGATIVE
DSDNA IGG SER QL IA: 0.8 IU/ML
NUCLEAR IGG SER IA-RTO: 0.2 U/ML

## 2024-08-30 ENCOUNTER — OFFICE VISIT (OUTPATIENT)
Dept: PRIMARY CARE CLINIC | Age: 59
End: 2024-08-30

## 2024-08-30 VITALS
WEIGHT: 135 LBS | DIASTOLIC BLOOD PRESSURE: 72 MMHG | BODY MASS INDEX: 23.05 KG/M2 | SYSTOLIC BLOOD PRESSURE: 113 MMHG | TEMPERATURE: 102.5 F | HEART RATE: 98 BPM | HEIGHT: 64 IN | OXYGEN SATURATION: 100 %

## 2024-08-30 DIAGNOSIS — U07.1 COVID-19: Primary | ICD-10-CM

## 2024-08-30 DIAGNOSIS — Z20.822 EXPOSURE TO CONFIRMED CASE OF COVID-19: ICD-10-CM

## 2024-08-30 LAB
Lab: ABNORMAL
PERFORMING INSTRUMENT: ABNORMAL
QC PASS/FAIL: ABNORMAL
SARS-COV-2, POC: DETECTED

## 2024-08-30 ASSESSMENT — ENCOUNTER SYMPTOMS
WHEEZING: 0
COUGH: 1
SHORTNESS OF BREATH: 0
EYE DISCHARGE: 0
SORE THROAT: 1
RHINORRHEA: 1

## 2024-08-30 NOTE — PROGRESS NOTES
[facility] WALK IN CARE  [address]  [place] [address]    [facility] WALK IN CARE  [address]  [facility] [address]  Dept: [phone]    Margaret Barbour is a 59 y.o. female New patient, who presents to the walk-in clinic today with conditions/complaints as noted below:    Chief Complaint   Patient presents with    Pharyngitis     Sore throat fatigue chills headache cough  sx started Wednesday          HPI:     Patient is a 59-year-old female that presents today for acute illness. 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 Her symptoms started Wednesday evening with a sore throat. She's felt feverish and had chills. Reports headaches, fatigue, runny nose, and sneezing. No ear pain. Endorses cough as well; however no issues breathing. She's been taking Excedrin with mild relief.        Past Medical History:   Diagnosis Date    Abnormal Pap smear, low grade squamous intraepithelial lesion (LGSIL) 2004    Chronic headaches     Duodenal ulcer     history of ulcer over 1 yr ago (8/2016)    History of renal stone     passed    Melanoma of skin, site unspecified 2008    Malignant melanoma    MTHFR mutation     HETERO    Osteoarthritis of both knees     Pyloric stenosis     repaired as infant    Scoliosis     Tonsillar enlargement 02/04/2023    L tonsil I+D       Current Outpatient Medications   Medication Sig Dispense Refill    pantoprazole (PROTONIX) 40 MG tablet       turmeric 500 MG CAPS Take 2 capsules by mouth daily      acetaminophen (TYLENOL) 325 MG tablet Take 2 tablets by mouth every 6 hours as needed for Pain      L-METHYLFOLATE CALCIUM PO Take by mouth daily (Patient not taking: Reported on 8/30/2024)       No current facility-administered medications for this visit.       No Known Allergies    :     Review of Systems

## 2024-09-12 DIAGNOSIS — Z96.653 HISTORY OF BILATERAL KNEE REPLACEMENT: Primary | ICD-10-CM

## 2024-09-12 RX ORDER — AMOXICILLIN 500 MG/1
2000 TABLET, FILM COATED ORAL PRN
Qty: 4 TABLET | Refills: 0 | Status: SHIPPED | OUTPATIENT
Start: 2024-09-12

## 2024-09-18 LAB
CHOLEST SERPL-MCNC: 196 MG/DL (ref 0–199)
CHOLESTEROL/HDL RATIO: 3
GLUCOSE SERPL-MCNC: 95 MG/DL (ref 74–99)
HDLC SERPL-MCNC: 63 MG/DL
LDLC SERPL CALC-MCNC: 122 MG/DL (ref 0–100)
PATIENT FASTING?: YES
TRIGL SERPL-MCNC: 57 MG/DL
VLDLC SERPL CALC-MCNC: 11 MG/DL

## 2024-10-18 DIAGNOSIS — Z96.653 HISTORY OF BILATERAL KNEE REPLACEMENT: Primary | ICD-10-CM

## 2024-10-24 ENCOUNTER — OFFICE VISIT (OUTPATIENT)
Dept: ORTHOPEDIC SURGERY | Age: 59
End: 2024-10-24
Payer: COMMERCIAL

## 2024-10-24 VITALS — RESPIRATION RATE: 15 BRPM | BODY MASS INDEX: 23.9 KG/M2 | OXYGEN SATURATION: 98 % | WEIGHT: 140 LBS | HEIGHT: 64 IN

## 2024-10-24 DIAGNOSIS — T84.032D MECHANICAL LOOSENING OF INTERNAL RIGHT KNEE PROSTHETIC JOINT, SUBSEQUENT ENCOUNTER: ICD-10-CM

## 2024-10-24 DIAGNOSIS — T84.033D MECHANICAL LOOSENING OF INTERNAL LEFT KNEE PROSTHETIC JOINT, SUBSEQUENT ENCOUNTER: ICD-10-CM

## 2024-10-24 DIAGNOSIS — Z96.653 HISTORY OF BILATERAL KNEE REPLACEMENT: Primary | ICD-10-CM

## 2024-10-24 PROCEDURE — 99213 OFFICE O/P EST LOW 20 MIN: CPT | Performed by: PHYSICIAN ASSISTANT

## 2024-10-24 ASSESSMENT — ENCOUNTER SYMPTOMS
COLOR CHANGE: 0
RESPIRATORY NEGATIVE: 1
APNEA: 0
GASTROINTESTINAL NEGATIVE: 1
VOMITING: 0
NAUSEA: 0
ABDOMINAL DISTENTION: 0
COUGH: 0
CONSTIPATION: 0
ABDOMINAL PAIN: 0
SHORTNESS OF BREATH: 0
DIARRHEA: 0
CHEST TIGHTNESS: 0

## 2024-10-24 NOTE — PATIENT INSTRUCTIONS
PATIENTIQ:  PatientIQ helps [facility] stay in touch with you to know how you're feeling, and provides education and care instructions to you at various time points.   Your answers help your care team track your progress to provide the best care possible. PatientIQ will contact you pre-op and post-op via email or text with:  Educational Videos and Care Instructions  Questionnaires About How You're Feeling    Your participation provides you valuable education and helps [facility] continue to provide quality care to all patients. Thank you

## 2024-10-24 NOTE — PROGRESS NOTES
[facility] ORTHOPEDICS AND SPORTS MEDICINE  [address]  [facility] [address]  Dept: [phone]  Dept Fax: [phone]        Ambulatory Follow Up      Subjective:   Margaret Barbour is a 59 y.o. year old female who presents to our office today for routine followup regarding her   1. History of bilateral knee replacement    2. Mechanical loosening of internal right knee prosthetic joint, subsequent encounter    3. Mechanical loosening of internal left knee prosthetic joint, subsequent encounter    .    Chief Complaint   Patient presents with    Knee Pain     Bilateral knee pain- B TKA DOS 9/21/16       Date of surgery: 9/21/2016-bilateral total knee arthroplasty-DO MALI Rosen Margaret Barbour  is a 59 y.o.  female who presents today in follow for mechanical loosening of her bilateral total knee arthroplasties.  Patient is a physical therapist at [employer].  The patient was last seen on 4/11/2024 and underwent treatment in the form of continuing her exercises and following up every 6 months with pre-clinic x-rays.  Patient states that overall she is doing well.  She has no changes in activity level.  She still is walking several miles a day.  She is following dental prophylaxis.  She states that sometimes she does get some muscle tightness but usually she can stretch it out without any issues.    Review of Systems   Constitutional:  Positive for activity change. Negative for appetite change, fatigue and fever.   Respiratory: Negative.  Negative for apnea, cough, chest tightness and shortness of breath.    Cardiovascular: Negative.  Negative for chest pain, palpitations and leg swelling.   Gastrointestinal: Negative.  Negative for abdominal distention, abdominal pain, constipation, diarrhea, nausea and vomiting.   Genitourinary: Negative.  Negative for difficulty urinating, dysuria and hematuria.   Musculoskeletal:

## 2024-12-05 ENCOUNTER — ANESTHESIA EVENT (OUTPATIENT)
Dept: OPERATING ROOM | Age: 59
End: 2024-12-05
Payer: COMMERCIAL

## 2024-12-05 ENCOUNTER — HOSPITAL ENCOUNTER (OUTPATIENT)
Age: 59
Setting detail: OUTPATIENT SURGERY
Discharge: HOME | End: 2024-12-05
Attending: INTERNAL MEDICINE | Admitting: INTERNAL MEDICINE
Payer: COMMERCIAL

## 2024-12-05 ENCOUNTER — TRANSCRIBE ORDERS (OUTPATIENT)
Dept: ADMINISTRATIVE | Age: 59
End: 2024-12-05

## 2024-12-05 ENCOUNTER — HOSPITAL ENCOUNTER (OUTPATIENT)
Facility: CLINIC | Age: 59
Discharge: HOME | End: 2024-12-05
Payer: COMMERCIAL

## 2024-12-05 ENCOUNTER — ANESTHESIA (OUTPATIENT)
Dept: OPERATING ROOM | Age: 59
End: 2024-12-05
Payer: COMMERCIAL

## 2024-12-05 VITALS
WEIGHT: 143 LBS | OXYGEN SATURATION: 100 % | RESPIRATION RATE: 14 BRPM | DIASTOLIC BLOOD PRESSURE: 86 MMHG | SYSTOLIC BLOOD PRESSURE: 137 MMHG | HEIGHT: 64 IN | TEMPERATURE: 97 F | HEART RATE: 47 BPM | BODY MASS INDEX: 24.41 KG/M2

## 2024-12-05 DIAGNOSIS — M85.80 OSTEOPATHIA HYPEROSTOTICA CONGENITA: Primary | ICD-10-CM

## 2024-12-05 DIAGNOSIS — K22.70 BARRETT'S ESOPHAGUS WITHOUT DYSPLASIA: ICD-10-CM

## 2024-12-05 LAB
25(OH)D3 SERPL-MCNC: 25.6 NG/ML (ref 30–100)
ALBUMIN SERPL-MCNC: 4.8 G/DL (ref 3.5–5.2)
ALBUMIN/GLOB SERPL: 2.7 {RATIO} (ref 1–2.5)
ALP SERPL-CCNC: 93 U/L (ref 35–104)
ALT SERPL-CCNC: 19 U/L (ref 10–35)
ANION GAP SERPL CALCULATED.3IONS-SCNC: 9 MMOL/L (ref 9–16)
AST SERPL-CCNC: 29 U/L (ref 10–35)
BACTERIA URNS QL MICRO: ABNORMAL
BASOPHILS # BLD: 0.03 K/UL (ref 0–0.2)
BASOPHILS NFR BLD: 1 % (ref 0–2)
BILIRUB SERPL-MCNC: 0.5 MG/DL (ref 0–1.2)
BILIRUB UR QL STRIP: NEGATIVE
BUN SERPL-MCNC: 19 MG/DL (ref 6–20)
CALCIUM SERPL-MCNC: 10.7 MG/DL (ref 8.6–10.4)
CASTS #/AREA URNS LPF: ABNORMAL /LPF (ref 0–8)
CHLORIDE SERPL-SCNC: 104 MMOL/L (ref 98–107)
CHOLEST SERPL-MCNC: 195 MG/DL (ref 0–199)
CHOLESTEROL/HDL RATIO: 3
CLARITY UR: ABNORMAL
CO2 SERPL-SCNC: 27 MMOL/L (ref 20–31)
COLOR UR: YELLOW
CREAT SERPL-MCNC: 0.9 MG/DL (ref 0.6–0.9)
EOSINOPHIL # BLD: 0.15 K/UL (ref 0–0.44)
EOSINOPHILS RELATIVE PERCENT: 2 % (ref 1–4)
EPI CELLS #/AREA URNS HPF: ABNORMAL /HPF (ref 0–5)
ERYTHROCYTE [DISTWIDTH] IN BLOOD BY AUTOMATED COUNT: 12.8 % (ref 11.8–14.4)
GFR, ESTIMATED: 74 ML/MIN/1.73M2
GLUCOSE SERPL-MCNC: 85 MG/DL (ref 74–99)
GLUCOSE UR STRIP-MCNC: NEGATIVE MG/DL
HCT VFR BLD AUTO: 44.5 % (ref 36.3–47.1)
HDLC SERPL-MCNC: 66 MG/DL
HGB BLD-MCNC: 14 G/DL (ref 11.9–15.1)
HGB UR QL STRIP.AUTO: NEGATIVE
IMM GRANULOCYTES # BLD AUTO: <0.03 K/UL (ref 0–0.3)
IMM GRANULOCYTES NFR BLD: 0 %
KETONES UR STRIP-MCNC: NEGATIVE MG/DL
LDLC SERPL CALC-MCNC: 120 MG/DL (ref 0–100)
LEUKOCYTE ESTERASE UR QL STRIP: NEGATIVE
LYMPHOCYTES NFR BLD: 2.12 K/UL (ref 1.1–3.7)
LYMPHOCYTES RELATIVE PERCENT: 33 % (ref 24–43)
MAGNESIUM SERPL-MCNC: 2.3 MG/DL (ref 1.6–2.6)
MCH RBC QN AUTO: 27.7 PG (ref 25.2–33.5)
MCHC RBC AUTO-ENTMCNC: 31.5 G/DL (ref 28.4–34.8)
MCV RBC AUTO: 88.1 FL (ref 82.6–102.9)
MONOCYTES NFR BLD: 0.46 K/UL (ref 0.1–1.2)
MONOCYTES NFR BLD: 7 % (ref 3–12)
NEUTROPHILS NFR BLD: 57 % (ref 36–65)
NEUTS SEG NFR BLD: 3.62 K/UL (ref 1.5–8.1)
NITRITE UR QL STRIP: NEGATIVE
NRBC BLD-RTO: 0 PER 100 WBC
PH UR STRIP: 8 [PH] (ref 5–8)
PLATELET # BLD AUTO: 282 K/UL (ref 138–453)
PMV BLD AUTO: 10.3 FL (ref 8.1–13.5)
POTASSIUM SERPL-SCNC: 4.2 MMOL/L (ref 3.7–5.3)
PROT SERPL-MCNC: 6.6 G/DL (ref 6.6–8.7)
PROT UR STRIP-MCNC: NEGATIVE MG/DL
PTH-INTACT SERPL-MCNC: 77 PG/ML (ref 15–65)
RBC # BLD AUTO: 5.05 M/UL (ref 3.95–5.11)
RBC #/AREA URNS HPF: ABNORMAL /HPF (ref 0–4)
SODIUM SERPL-SCNC: 140 MMOL/L (ref 136–145)
SP GR UR STRIP: 1.01 (ref 1–1.03)
T4 FREE SERPL-MCNC: 1.3 NG/DL (ref 0.92–1.68)
TRIGL SERPL-MCNC: 46 MG/DL
TSH SERPL DL<=0.05 MIU/L-ACNC: 2.84 UIU/ML (ref 0.27–4.2)
UROBILINOGEN UR STRIP-ACNC: NORMAL EU/DL (ref 0–1)
VLDLC SERPL CALC-MCNC: 9 MG/DL (ref 1–30)
WBC #/AREA URNS HPF: ABNORMAL /HPF (ref 0–5)
WBC OTHER # BLD: 6.4 K/UL (ref 3.5–11.3)

## 2024-12-05 PROCEDURE — 84439 ASSAY OF FREE THYROXINE: CPT

## 2024-12-05 PROCEDURE — 6360000002 HC RX W HCPCS: Performed by: NURSE ANESTHETIST, CERTIFIED REGISTERED

## 2024-12-05 PROCEDURE — 82306 VITAMIN D 25 HYDROXY: CPT

## 2024-12-05 PROCEDURE — 2580000003 HC RX 258: Performed by: ANESTHESIOLOGY

## 2024-12-05 PROCEDURE — 80053 COMPREHEN METABOLIC PANEL: CPT

## 2024-12-05 PROCEDURE — 3700000001 HC ADD 15 MINUTES (ANESTHESIA): Performed by: INTERNAL MEDICINE

## 2024-12-05 PROCEDURE — 81001 URINALYSIS AUTO W/SCOPE: CPT

## 2024-12-05 PROCEDURE — 83970 ASSAY OF PARATHORMONE: CPT

## 2024-12-05 PROCEDURE — 3700000000 HC ANESTHESIA ATTENDED CARE: Performed by: INTERNAL MEDICINE

## 2024-12-05 PROCEDURE — 7100000010 HC PHASE II RECOVERY - FIRST 15 MIN: Performed by: INTERNAL MEDICINE

## 2024-12-05 PROCEDURE — 88305 TISSUE EXAM BY PATHOLOGIST: CPT

## 2024-12-05 PROCEDURE — 85025 COMPLETE CBC W/AUTO DIFF WBC: CPT

## 2024-12-05 PROCEDURE — 36415 COLL VENOUS BLD VENIPUNCTURE: CPT

## 2024-12-05 PROCEDURE — 2709999900 HC NON-CHARGEABLE SUPPLY: Performed by: INTERNAL MEDICINE

## 2024-12-05 PROCEDURE — 83735 ASSAY OF MAGNESIUM: CPT

## 2024-12-05 PROCEDURE — 84443 ASSAY THYROID STIM HORMONE: CPT

## 2024-12-05 PROCEDURE — 7100000011 HC PHASE II RECOVERY - ADDTL 15 MIN: Performed by: INTERNAL MEDICINE

## 2024-12-05 PROCEDURE — 3609012400 HC EGD TRANSORAL BIOPSY SINGLE/MULTIPLE: Performed by: INTERNAL MEDICINE

## 2024-12-05 PROCEDURE — 80061 LIPID PANEL: CPT

## 2024-12-05 RX ORDER — PROPOFOL 10 MG/ML
INJECTION, EMULSION INTRAVENOUS
Status: DISCONTINUED | OUTPATIENT
Start: 2024-12-05 | End: 2024-12-05 | Stop reason: SDUPTHER

## 2024-12-05 RX ORDER — SODIUM CHLORIDE 9 MG/ML
INJECTION, SOLUTION INTRAVENOUS CONTINUOUS
Status: DISCONTINUED | OUTPATIENT
Start: 2024-12-05 | End: 2024-12-05 | Stop reason: HOSPADM

## 2024-12-05 RX ORDER — SODIUM CHLORIDE 9 MG/ML
INJECTION, SOLUTION INTRAVENOUS PRN
Status: DISCONTINUED | OUTPATIENT
Start: 2024-12-05 | End: 2024-12-05 | Stop reason: HOSPADM

## 2024-12-05 RX ORDER — SODIUM CHLORIDE 0.9 % (FLUSH) 0.9 %
5-40 SYRINGE (ML) INJECTION PRN
Status: DISCONTINUED | OUTPATIENT
Start: 2024-12-05 | End: 2024-12-05 | Stop reason: HOSPADM

## 2024-12-05 RX ORDER — SODIUM CHLORIDE 0.9 % (FLUSH) 0.9 %
5-40 SYRINGE (ML) INJECTION EVERY 12 HOURS SCHEDULED
Status: DISCONTINUED | OUTPATIENT
Start: 2024-12-05 | End: 2024-12-05 | Stop reason: HOSPADM

## 2024-12-05 RX ORDER — LIDOCAINE HYDROCHLORIDE 10 MG/ML
1 INJECTION, SOLUTION EPIDURAL; INFILTRATION; INTRACAUDAL; PERINEURAL
Status: DISCONTINUED | OUTPATIENT
Start: 2024-12-06 | End: 2024-12-05 | Stop reason: HOSPADM

## 2024-12-05 RX ORDER — LIDOCAINE HYDROCHLORIDE 20 MG/ML
INJECTION, SOLUTION EPIDURAL; INFILTRATION; INTRACAUDAL; PERINEURAL
Status: DISCONTINUED | OUTPATIENT
Start: 2024-12-05 | End: 2024-12-05 | Stop reason: SDUPTHER

## 2024-12-05 RX ORDER — SODIUM CHLORIDE, SODIUM LACTATE, POTASSIUM CHLORIDE, CALCIUM CHLORIDE 600; 310; 30; 20 MG/100ML; MG/100ML; MG/100ML; MG/100ML
INJECTION, SOLUTION INTRAVENOUS CONTINUOUS
Status: DISCONTINUED | OUTPATIENT
Start: 2024-12-05 | End: 2024-12-05 | Stop reason: HOSPADM

## 2024-12-05 RX ADMIN — LIDOCAINE HYDROCHLORIDE 60 MG: 20 INJECTION, SOLUTION EPIDURAL; INFILTRATION; INTRACAUDAL; PERINEURAL at 14:57

## 2024-12-05 RX ADMIN — PROPOFOL 50 MG: 10 INJECTION, EMULSION INTRAVENOUS at 14:57

## 2024-12-05 RX ADMIN — PROPOFOL 50 MG: 10 INJECTION, EMULSION INTRAVENOUS at 15:01

## 2024-12-05 RX ADMIN — PROPOFOL 50 MG: 10 INJECTION, EMULSION INTRAVENOUS at 15:09

## 2024-12-05 RX ADMIN — SODIUM CHLORIDE, POTASSIUM CHLORIDE, SODIUM LACTATE AND CALCIUM CHLORIDE: 600; 310; 30; 20 INJECTION, SOLUTION INTRAVENOUS at 14:48

## 2024-12-05 RX ADMIN — PROPOFOL 50 MG: 10 INJECTION, EMULSION INTRAVENOUS at 15:05

## 2024-12-05 ASSESSMENT — PAIN - FUNCTIONAL ASSESSMENT
PAIN_FUNCTIONAL_ASSESSMENT: NONE - DENIES PAIN
PAIN_FUNCTIONAL_ASSESSMENT: 0-10

## 2024-12-05 ASSESSMENT — ENCOUNTER SYMPTOMS: SHORTNESS OF BREATH: 0

## 2024-12-05 NOTE — OP NOTE
Operative Note      PROCEDURE NOTE    DATE OF PROCEDURE: 12/5/2024     SURGEON: Litzy Hodges MD  Facility: formerly [facility]  ASSISTANT: None  Anesthesia: MAC    PREOPERATIVE DIAGNOSIS: Barretts esophagus     Diagnosis: He's esophagus     POSTOPERATIVE DIAGNOSIS: As described below    OPERATION: Upper GI endoscopy with Biopsy    ANESTHESIA: MAC     ESTIMATED BLOOD LOSS: Less than 50 ml    COMPLICATIONS: None.     SPECIMENS:  Was Obtained: Esophageal biopsies    HISTORY: The patient is a 59 y.o. year old female with history of above preop diagnosis.  I recommended esophagogastroduodenoscopy with possible biopsy and I explained the risk, benefits, expected outcome, and alternatives to the procedure.  Risks included but are not limited to bleeding, infection, respiratory distress, hypotension, and perforation of the esophagus, stomach, or duodenum.  Patient understands and is in agreement.    PROCEDURE: The patient was given MAC.  The patient's SPO2 remained above 90% throughout the procedure.The gastroscope was inserted orally and advanced under direct vision through the esophagus, through the stomach, through the pylorus, and into the descending duodenum.      Post sedation note :The patient's SPO2 remained above 90% throughout the procedure.the vital signs remained stable , and no immediate complication form the procedure noted, patient will be ready for d/c when criteria is met .      Findings:    Retropharyngeal area was grossly normal appearing    Esophagus: Irregular squamocolumnar junction consistent with He's esophagus biopsies were done    Stomach:    Fundus: normal    Body: normal    Antrum: normal    Duodenum:     Descending: normal    Bulb: normal    The scope was removed and the patient tolerated the procedure well.     Recommendations/Plan:   F/U Biopsies  F/U EGD in 3 years   Discussed with the family    Electronically signed by Litzy Hodges MD  on 12/5/2024 at 3:17 PM

## 2024-12-05 NOTE — ANESTHESIA PRE PROCEDURE
Applicable):  Lab Results   Component Value Date    ABORH A POSITIVE 08/31/2016    LABANTI NEGATIVE 08/31/2016       Drug/Infectious Status (If Applicable):  No results found for: \"HIV\", \"HEPCAB\"    COVID-19 Screening (If Applicable):   Lab Results   Component Value Date/Time    COVID19 Detected 08/30/2024 12:38 PM           Anesthesia Evaluation    Airway: Mallampati: I  TM distance: >3 FB   Neck ROM: full  Mouth opening: > = 3 FB   Dental:          Pulmonary:       (-) shortness of breath                           Cardiovascular:        (-)  angina                Neuro/Psych:               GI/Hepatic/Renal:   (+) GERD:, PUD          Endo/Other:                     Abdominal:             Vascular:          Other Findings:       Anesthesia Plan      MAC     ASA 2                               Karan Gunn MD   12/5/2024

## 2024-12-05 NOTE — ANESTHESIA POSTPROCEDURE EVALUATION
Department of Anesthesiology  Postprocedure Note    Patient: Margaret Barbour  MRN: [id number]  YOB: 1965  Date of evaluation: 12/5/2024    Procedure Summary       Date: 12/05/24 Room / Location: [address]    Anesthesia Start: 1448 Anesthesia Stop: 1518    Procedure: ESOPHAGOGASTRODUODENOSCOPY BIOPSY Diagnosis:       He's esophagus without dysplasia      (He's esophagus without dysplasia [K22.70])    Surgeons: Litzy Hodges MD Responsible Provider: Karan Gunn MD    Anesthesia Type: MAC ASA Status: 2            Anesthesia Type: No value filed.    Andra Phase I: Andra Score: 10    Andra Phase II:      Anesthesia Post Evaluation    Airway patency: patent  Cardiovascular status: hemodynamically stable  Respiratory status: acceptable    No notable events documented.

## 2024-12-05 NOTE — DISCHARGE INSTRUCTIONS
Upper GI Endoscopy: What to Expect at Home  Your Recovery  After you have an endoscopy, you will stay at the hospital or clinic for 1 to 2 hours. This will allow the medicine to wear off. You will be able to go home after your doctor or nurse checks to make sure you are not having any problems.  You may have a sore throat for a day or two after the test.  This care sheet gives you a general idea about what to expect after the test.  How can you care for yourself at home?  Activity  Rest as much as you need to after you go home.  You should be able to go back to your usual activities the day after the test.  Diet  Follow your doctor's directions for eating after the test.  Drink plenty of fluids (unless your doctor has told you not to).  Follow-up care is a key part of your treatment and safety. Be sure to make and go to all appointments, and call your doctor if you are having problems. It's also a good idea to know your test results and keep a list of the medicines you take.  When should you call for help?  Call 911 anytime you think you may need emergency care. For example, call if:  You passed out (lost consciousness).  You cough up blood.  You vomit blood or what looks like coffee grounds.  You pass maroon or very bloody stools.  Call your doctor now or seek immediate medical care if:  You have trouble swallowing.  You have belly pain.  Your stools are black and tarlike or have streaks of blood.  You are sick to your stomach or cannot keep fluids down.  Watch closely for changes in your health, and be sure to contact your doctor if:  Your throat still hurts after a day or two.  You do not get better as expected.   Where can you learn more?   Go to https://rain.1-800-DOCTORS.org and sign in to your [employer] account. Enter J454 in the Search Health Information box to learn more about “Upper GI Endoscopy: What to Expect at Home.”    If you do not have an account, please click on the “Sign Up Now” link.

## 2024-12-05 NOTE — H&P
Interval H&P Note    Pt Name: Margaret Barbour  MRN: [id number]  YOB: 1965  Date of evaluation: 12/5/2024      [x] I have reviewed the hard copy primary care progress note by Dr. Lovett dated 11/25/2024, labeled in paper chart for an Interval History and Physical note.     [x] I have examined  Margaret Barbour, a 59 y.o. female.There are no changes to the patient who is scheduled for ESOPHAGOGASTRODUODENOSCOPY by Litzy Hodges MD for He's esophagus without dysplasia. Patient has known He's esophagus and has had previous esophageal ulcer. States today is routine monitoring and she is asymptomatic. She denies bloody tarry stools, diarrhea alternating with constipation, nausea, vomiting, abdominal pain or unintentional weight loss. Patient has been NPO since midnight. Has had previous colonoscopy and EGD. The patient denies new health changes, fever, chills, wheezing, cough, increased SOB, chest pain, open sores or wounds. Denies hx of diabetes. Denies any current blood thinning medications.     Vital signs: BP (!) 147/97   Pulse 60   Temp 98.6 °F (37 °C) (Temporal)   Resp 18   Ht 1.626 m (5' 4\")   Wt 64.9 kg (143 lb)   LMP  (LMP Unknown)   SpO2 98%   BMI 24.55 kg/m²     Allergies:  Patient has no known allergies.    Medications:    Prior to Admission medications    Medication Sig Start Date End Date Taking? Authorizing Provider   amoxicillin (AMOXIL) 500 MG tablet Take 4 tablets by mouth as needed (dental prophylaxis) Take Amoxicillin 4 tabs (2 grams) 30-60 mins prior to dental procedure/cleaning or invasive procedure for Orthopedic hardware infection prophylaxis. 9/12/24  Yes Barbara Durbin PA-C   L-METHYLFOLATE CALCIUM PO Take by mouth daily   Yes ProviderMary MD   pantoprazole (PROTONIX) 40 MG tablet  4/11/22  Yes Provider, MD Mary   turmeric 500 MG CAPS Take 2 capsules by mouth daily   Yes Provider, MD Mary   acetaminophen (TYLENOL) 325 MG tablet Take 2

## 2024-12-09 LAB — SURGICAL PATHOLOGY REPORT: NORMAL

## 2024-12-13 ENCOUNTER — HOSPITAL ENCOUNTER (OUTPATIENT)
Dept: MAMMOGRAPHY | Age: 59
Discharge: HOME | End: 2024-12-15
Payer: COMMERCIAL

## 2024-12-13 VITALS — BODY MASS INDEX: 23.56 KG/M2 | WEIGHT: 138 LBS | HEIGHT: 64 IN

## 2024-12-13 DIAGNOSIS — Z12.31 VISIT FOR SCREENING MAMMOGRAM: ICD-10-CM

## 2024-12-13 PROCEDURE — 77063 BREAST TOMOSYNTHESIS BI: CPT

## 2024-12-28 ENCOUNTER — HOSPITAL ENCOUNTER (OUTPATIENT)
Dept: MAMMOGRAPHY | Age: 59
Discharge: HOME | End: 2024-12-30
Attending: INTERNAL MEDICINE
Payer: COMMERCIAL

## 2024-12-28 DIAGNOSIS — M85.80 OSTEOPATHIA HYPEROSTOTICA CONGENITA: ICD-10-CM

## 2024-12-28 PROCEDURE — 77080 DXA BONE DENSITY AXIAL: CPT

## 2025-01-21 DIAGNOSIS — M25.512 LEFT SHOULDER PAIN, UNSPECIFIED CHRONICITY: Primary | ICD-10-CM

## 2025-01-23 ENCOUNTER — TELEPHONE (OUTPATIENT)
Dept: ORTHOPEDIC SURGERY | Age: 60
End: 2025-01-23

## 2025-01-23 ENCOUNTER — HOSPITAL ENCOUNTER (OUTPATIENT)
Dept: MRI IMAGING | Facility: CLINIC | Age: 60
Discharge: HOME | End: 2025-01-25
Payer: COMMERCIAL

## 2025-01-23 ENCOUNTER — OFFICE VISIT (OUTPATIENT)
Dept: ORTHOPEDIC SURGERY | Age: 60
End: 2025-01-23

## 2025-01-23 VITALS — RESPIRATION RATE: 16 BRPM | BODY MASS INDEX: 23.9 KG/M2 | OXYGEN SATURATION: 100 % | WEIGHT: 140 LBS | HEIGHT: 64 IN
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
## 2025-01-23 DIAGNOSIS — S49.92XA TRAUMATIC INJURY OF LEFT SHOULDER: ICD-10-CM
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
## 2025-01-23 DIAGNOSIS — S46.012A TRAUMATIC ROTATOR CUFF TEAR, LEFT, INITIAL ENCOUNTER: Primary | ICD-10-CM

## 2025-01-23 PROCEDURE — 73221 MRI JOINT UPR EXTREM W/O DYE: CPT

## 2025-01-23 ASSESSMENT — ENCOUNTER SYMPTOMS
CHEST TIGHTNESS: 0
ABDOMINAL DISTENTION: 0
GASTROINTESTINAL NEGATIVE: 1
SHORTNESS OF BREATH: 0
APNEA: 0
VOMITING: 0
DIARRHEA: 0
CONSTIPATION: 0
RESPIRATORY NEGATIVE: 1
ABDOMINAL PAIN: 0
NAUSEA: 0
COUGH: 0
COLOR CHANGE: 0

## 2025-01-23 NOTE — TELEPHONE ENCOUNTER
I called Maryann to give her the results of her MRI.  She does have a traumatic rotator cuff tear of her supraspinatus.  She asked for a referral to Dr. Cabral who happens to be in the office here tomorrow so she will be added to his scheduled for an MRI review and a surgery discussion.

## 2025-01-23 NOTE — PROGRESS NOTES
[facility] PHYSICIANS [facility] ORTHOPEDICS AND SPORTS MEDICINE  [address]  [facility] [address] [address]  Dept: [phone]    Ambulatory Orthopedic New Patient Visit      CHIEF COMPLAINT:    Chief Complaint   Patient presents with   • Shoulder Pain     Left - 1/20/25 - Fell on ice        HISTORY OF PRESENT ILLNESS:      Date of Injury: 1/20/2025    History of Present Illness  The patient presents for evaluation of left shoulder pain.    She sustained an injury to her left shoulder following a fall on ice while walking her dogs on Monday. Despite the initial impact, she finished her walk, hoping the injury was not severe. However, upon reaching her workplace, she experienced difficulty with tasks such as removing her jacket, and the pain progressively worsened throughout the day, eventually preventing her from lifting her arm due to the pain. She noticed significant bruising on her upper arm the following night, which she attributes to the injury rather than the impact of the fall. She did not experience any popping or snapping sensations at the time of the fall. She is right-handed. She reports no numbness, tingling, or pins and needles sensation. She also reports no pain in the collarbone, AC joint, ribs, or neck, and does not experience pain when coughing. She has been managing the pain with Mobic, which provided some relief the next day. She also applied ice to the area but discontinued this after experiencing increased soreness. She is a physical therapist at [employer]. She has been performing isometric exercises and Codman's pendulum exercises, but is hesitant to continue until the extent of her injury is determined. She recalls a similar incident with her right shoulder in 2013, which she did not seek immediate treatment for, leading to a diagnosis of tears of supraspinatus and infraspinatus in 2021, and the only surgery that would fix it is reverse

## 2025-01-23 NOTE — PATIENT INSTRUCTIONS
PATIENTIQ:  PatientIQ helps [facility] stay in touch with you to know how you're feeling, and provides education and care instructions to you at various time points.   Your answers help your care team track your progress to provide the best care possible. PatientIQ will contact you pre-op and post-op via email or text with:  Educational Videos and Care Instructions  Questionnaires About How You're Feeling    Your participation provides you valuable education and helps [facility] continue to provide quality care to all patients. Thank you

## 2025-01-24 ENCOUNTER — OFFICE VISIT (OUTPATIENT)
Dept: ORTHOPEDIC SURGERY | Age: 60
End: 2025-01-24
Payer: COMMERCIAL

## 2025-01-24 VITALS — RESPIRATION RATE: 16 BRPM | OXYGEN SATURATION: 100 % | WEIGHT: 140 LBS | HEIGHT: 64 IN | BODY MASS INDEX: 23.9 KG/M2

## 2025-01-24 DIAGNOSIS — S46.012A TRAUMATIC ROTATOR CUFF TEAR, LEFT, INITIAL ENCOUNTER: Primary | ICD-10-CM

## 2025-01-24 PROCEDURE — 99214 OFFICE O/P EST MOD 30 MIN: CPT | Performed by: ORTHOPAEDIC SURGERY

## 2025-01-24 NOTE — PATIENT INSTRUCTIONS
PATIENTIQ:  PatientIQ helps [facility] stay in touch with you to know how you're feeling, and provides education and care instructions to you at various time points.   Your answers help your care team track your progress to provide the best care possible. PatientIQ will contact you pre-op and post-op via email or text with:  Educational Videos and Care Instructions  Questionnaires About How You're Feeling    Your participation provides you valuable education and helps [facility] continue to provide quality care to all patients. Thank you

## 2025-01-24 NOTE — PROGRESS NOTES
[facility] ORTHOPEDICS AND SPORTS MEDICINE  [address]  [place] [id number]  Dept: [phone]     Orthopedic Surgery    Shoulder Pain (Left )       01/24/25  Margaret Barbour is a 59 y.o. female presenting for evaluation of left shoulder pain and weakness after a slip and fall on ice 4 days ago.  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  She already has a history of right shoulder rotator cuff tears and the left shoulder had been her good shoulder.  She however now has pain and weakness after the fall.  MRI demonstrates a complete full-thickness 1.5 cm retracted supraspinatus tendon tear.  There is also a small bone spur underneath the acromion and biceps tendinitis.  Options discussed.  She would like left shoulder arthroscopy with rotator cuff repair and any other indicated procedures seen at the time of arthroscopy      Review of Systems:  Joint pain  All other systems reviewed and are negative.    Past Surgical History:   Procedure Laterality Date    COLONOSCOPY      ENDOSCOPY, COLON, DIAGNOSTIC      EYE SURGERY      lazy eye    HYSTEROSCOPY  2008    JOINT REPLACEMENT      bilat knees    KNEE ARTHROPLASTY Bilateral 09/21/2016    SKIN BIOPSY      SKIN CANCER EXCISION  2008    TONSILLECTOMY AND ADENOIDECTOMY Left 02/04/2023    LEFT TONSILLAR INCISION AND DRAINAGE performed by Randall Wilson MD at [facility] OR    UPPER GASTROINTESTINAL ENDOSCOPY N/A 12/30/2021    EGD BIOPSY performed by Litzy Hodges MD at [facility] OR    UPPER GASTROINTESTINAL ENDOSCOPY N/A 05/19/2022    EGD BIOPSY performed by Litzy Hodges MD at [facility] OR    UPPER GASTROINTESTINAL ENDOSCOPY N/A 12/5/2024    ESOPHAGOGASTRODUODENOSCOPY BIOPSY performed by Litzy Hodges MD at [facility] OR      Past Medical History:   Diagnosis Date    Abnormal Pap smear, low grade squamous intraepithelial lesion (LGSIL) 2004    He esophagus     Chronic headaches     Duodenal ulcer

## 2025-01-27 DIAGNOSIS — S46.012A TRAUMATIC ROTATOR CUFF TEAR, LEFT, INITIAL ENCOUNTER: Primary | ICD-10-CM

## 2025-01-28 ENCOUNTER — TELEPHONE (OUTPATIENT)
Dept: ORTHOPEDIC SURGERY | Age: 60
End: 2025-01-28

## 2025-01-28 NOTE — TELEPHONE ENCOUNTER
Called patient to schedule surgery, she has decided to get a second opinion with Dr. Winters.  She will call back if she needs anything else.

## 2025-01-29 ENCOUNTER — OFFICE VISIT (OUTPATIENT)
Dept: ORTHOPEDIC SURGERY | Age: 60
End: 2025-01-29
Payer: COMMERCIAL

## 2025-01-29 ENCOUNTER — HOSPITAL ENCOUNTER (OUTPATIENT)
Age: 60
Discharge: HOME | End: 2025-01-29
Payer: COMMERCIAL

## 2025-01-29 VITALS — WEIGHT: 140 LBS | BODY MASS INDEX: 23.9 KG/M2 | RESPIRATION RATE: 14 BRPM | HEIGHT: 64 IN

## 2025-01-29 DIAGNOSIS — S46.012A TRAUMATIC ROTATOR CUFF TEAR, LEFT, INITIAL ENCOUNTER: ICD-10-CM

## 2025-01-29 DIAGNOSIS — S46.012A TRAUMATIC ROTATOR CUFF TEAR, LEFT, INITIAL ENCOUNTER: Primary | ICD-10-CM

## 2025-01-29 LAB
ANION GAP SERPL CALCULATED.3IONS-SCNC: 11 MMOL/L (ref 9–16)
BUN SERPL-MCNC: 22 MG/DL (ref 6–20)
CALCIUM SERPL-MCNC: 10.9 MG/DL (ref 8.6–10.4)
CHLORIDE SERPL-SCNC: 105 MMOL/L (ref 98–107)
CO2 SERPL-SCNC: 25 MMOL/L (ref 20–31)
CREAT SERPL-MCNC: 1.1 MG/DL (ref 0.6–0.9)
ERYTHROCYTE [DISTWIDTH] IN BLOOD BY AUTOMATED COUNT: 13 % (ref 11.8–14.4)
GFR, ESTIMATED: 58 ML/MIN/1.73M2
GLUCOSE SERPL-MCNC: 95 MG/DL (ref 74–99)
HCT VFR BLD AUTO: 44.5 % (ref 36.3–47.1)
HGB BLD-MCNC: 14.2 G/DL (ref 11.9–15.1)
MCH RBC QN AUTO: 27.7 PG (ref 25.2–33.5)
MCHC RBC AUTO-ENTMCNC: 31.9 G/DL (ref 28.4–34.8)
MCV RBC AUTO: 86.7 FL (ref 82.6–102.9)
NRBC BLD-RTO: 0 PER 100 WBC
PLATELET # BLD AUTO: 298 K/UL (ref 138–453)
PMV BLD AUTO: 10.5 FL (ref 8.1–13.5)
POTASSIUM SERPL-SCNC: 4.5 MMOL/L (ref 3.7–5.3)
RBC # BLD AUTO: 5.13 M/UL (ref 3.95–5.11)
SODIUM SERPL-SCNC: 141 MMOL/L (ref 136–145)
WBC OTHER # BLD: 6.3 K/UL (ref 3.5–11.3)

## 2025-01-29 PROCEDURE — 80048 BASIC METABOLIC PNL TOTAL CA: CPT

## 2025-01-29 PROCEDURE — 99214 OFFICE O/P EST MOD 30 MIN: CPT | Performed by: ORTHOPAEDIC SURGERY

## 2025-01-29 PROCEDURE — 36415 COLL VENOUS BLD VENIPUNCTURE: CPT

## 2025-01-29 PROCEDURE — 85027 COMPLETE CBC AUTOMATED: CPT

## 2025-01-29 NOTE — PROGRESS NOTES
Orthopedic Shoulder Encounter Note     Chief complaint: Left shoulder pain    HPI: Margaret Barbour is a 59 y.o.  right-hand dominant female who presents for evaluation of her left shoulder.  She indicates that about 9 days ago she slipped and fell on ice landing on and hurting her left shoulder.  She was seen by Barbara Durbin PA-C who obtained an MRI study of her shoulder to rule out the presence of an acute traumatic rotator cuff tear.  The MRI did demonstrate said tear and so she presents today to discuss treatment options.  Her pain is localized to the left shoulder primarily.  No associated paresthesias.  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    Previous treatment:    NSAIDs: Mobic    Physical Therapy: No    Injections: History of a right shoulder cortisone injection 10 years ago    Surgeries: None    Review of Systems:   Constitutional: Negative for fever, chills, sweats.   Pain Score:   4  Neurological: Negative for headache, numbness, or weakness.   Musculoskeletal: As noted in HPI     Past Medical History  Margaret  has a past medical history of Abnormal Pap smear, low grade squamous intraepithelial lesion (LGSIL), He esophagus, Chronic headaches, Duodenal ulcer, History of renal stone, Melanoma of skin, site unspecified, MTHFR mutation, Osteoarthritis of both knees, Pyloric stenosis, Scoliosis, and Tonsillar enlargement.    Past Surgical History  Margaret  has a past surgical history that includes hysteroscopy (2008); Skin cancer excision (2008); Eye surgery; skin biopsy; Knee Arthroplasty (Bilateral, 09/21/2016); Upper gastrointestinal endoscopy (N/A, 12/30/2021); Endoscopy, colon, diagnostic; joint replacement; Upper gastrointestinal endoscopy (N/A, 05/19/2022); Tonsillectomy and adenoidectomy (Left, 02/04/2023); Colonoscopy; and Upper gastrointestinal endoscopy (N/A, 12/5/2024).    Current Medications  Current Outpatient Medications   Medication Sig Dispense Refill

## 2025-01-30 ENCOUNTER — PREP FOR PROCEDURE (OUTPATIENT)
Dept: ORTHOPEDIC SURGERY | Age: 60
End: 2025-01-30

## 2025-01-30 DIAGNOSIS — S46.012A TRAUMATIC ROTATOR CUFF TEAR, LEFT, INITIAL ENCOUNTER: Primary | ICD-10-CM

## 2025-01-30 PROBLEM — M75.102 LEFT ROTATOR CUFF TEAR: Status: ACTIVE | Noted: 2025-01-30

## 2025-01-31 VITALS — WEIGHT: 138 LBS | HEIGHT: 64 IN | BODY MASS INDEX: 23.56 KG/M2

## 2025-01-31 NOTE — PROGRESS NOTES
DAY OF SURGERY/PROCEDURE  GUIDELINES    As a patient at the [facility], you can expect quality medical and nursing care that is centered on your individual needs. It is our goal to make your surgical experience as comfortable and excellent as possible.  ________________________________________________________________________    The following instructions are general guidelines, if any information on this sheet is different from what your doctor has instructed you to do, please follow your doctor's instructions.    Please arrive on 2/7 @ 1045 am      Enter through entrance C. Check in at registration     Upon arrival you will be taken to the pre-operative area to get ready for surgery, your family will stay in the waiting room and visit with you once you are ready for surgery. Due to special limitations please limit visitation to 1-2 members of your family at a time. When it is time for surgery your family will return to the waiting room.    Nothing to eat, drink, smoke, suck or chew after midnight (no water, gum, mints, cigarettes, cigars, pipes, snuff, chewing tobacco, etc.) or your surgery may be canceled.     Take a shower or bath on the morning of your surgery/procedure (Hibiclens if directed) Do not apply any lotions.    Brush your teeth, but do not swallow any water    IN CASE OF ILLNESS - If you have a cold or flu symptoms (high fever, runny nose, sore throat, cough, etc.) rash, nausea, vomiting, loose stools, and/or recent contact with someone who has a contagious disease (chick pox, measles, etc.) please call your doctor before coming to the surgery center    Take a small sip of water with protonix    DO NOT take anticoagulants (blood thinners, aspirin or aspirin-containing products) as instructed by your physician.    Leave all jewelry at home and wear loose, comfortable clothing that is easy to put on and take off.     If you will be returning home the same day as your surgery, you

## 2025-02-03 ENCOUNTER — HOSPITAL ENCOUNTER (OUTPATIENT)
Age: 60
Discharge: HOME | End: 2025-02-07

## 2025-02-05 ENCOUNTER — TELEPHONE (OUTPATIENT)
Dept: ORTHOPEDIC SURGERY | Age: 60
End: 2025-02-05

## 2025-02-05 NOTE — TELEPHONE ENCOUNTER
Paperwork received by [place] [facility].     No Release of information on file. Informed patient release of information was needed in order to fax paperwork     Paperwork nearly completed, awaiting approval and signature from provider.     Paperwork at  in [place].

## 2025-02-06 ENCOUNTER — ANESTHESIA EVENT (OUTPATIENT)
Dept: OPERATING ROOM | Age: 60
End: 2025-02-06
Payer: COMMERCIAL

## 2025-02-07 ENCOUNTER — ANESTHESIA (OUTPATIENT)
Dept: OPERATING ROOM | Age: 60
End: 2025-02-07
Payer: COMMERCIAL

## 2025-02-07 ENCOUNTER — HOSPITAL ENCOUNTER (OUTPATIENT)
Age: 60
Setting detail: OUTPATIENT SURGERY
Discharge: HOME | End: 2025-02-07
Attending: ORTHOPAEDIC SURGERY | Admitting: ORTHOPAEDIC SURGERY
Payer: COMMERCIAL

## 2025-02-07 VITALS
OXYGEN SATURATION: 97 % | TEMPERATURE: 97.1 F | RESPIRATION RATE: 21 BRPM | BODY MASS INDEX: 24.34 KG/M2 | WEIGHT: 142.6 LBS | DIASTOLIC BLOOD PRESSURE: 86 MMHG | SYSTOLIC BLOOD PRESSURE: 139 MMHG | HEART RATE: 58 BPM | HEIGHT: 64 IN

## 2025-02-07 DIAGNOSIS — Z98.890 S/P LEFT ROTATOR CUFF REPAIR: Primary | ICD-10-CM

## 2025-02-07 PROCEDURE — 6370000000 HC RX 637 (ALT 250 FOR IP): Performed by: ANESTHESIOLOGY

## 2025-02-07 PROCEDURE — 3700000000 HC ANESTHESIA ATTENDED CARE: Performed by: ORTHOPAEDIC SURGERY

## 2025-02-07 PROCEDURE — 7100000001 HC PACU RECOVERY - ADDTL 15 MIN: Performed by: ORTHOPAEDIC SURGERY

## 2025-02-07 PROCEDURE — 2580000003 HC RX 258: Performed by: NURSE ANESTHETIST, CERTIFIED REGISTERED

## 2025-02-07 PROCEDURE — 3600000004 HC SURGERY LEVEL 4 BASE: Performed by: ORTHOPAEDIC SURGERY

## 2025-02-07 PROCEDURE — 6360000002 HC RX W HCPCS: Performed by: ORTHOPAEDIC SURGERY

## 2025-02-07 PROCEDURE — C1713 ANCHOR/SCREW BN/BN,TIS/BN: HCPCS | Performed by: ORTHOPAEDIC SURGERY

## 2025-02-07 PROCEDURE — 7100000011 HC PHASE II RECOVERY - ADDTL 15 MIN: Performed by: ORTHOPAEDIC SURGERY

## 2025-02-07 PROCEDURE — 2720000010 HC SURG SUPPLY STERILE: Performed by: ORTHOPAEDIC SURGERY

## 2025-02-07 PROCEDURE — 7100000000 HC PACU RECOVERY - FIRST 15 MIN: Performed by: ORTHOPAEDIC SURGERY

## 2025-02-07 PROCEDURE — 6360000002 HC RX W HCPCS: Performed by: NURSE ANESTHETIST, CERTIFIED REGISTERED

## 2025-02-07 PROCEDURE — 2500000003 HC RX 250 WO HCPCS: Performed by: ORTHOPAEDIC SURGERY

## 2025-02-07 PROCEDURE — 6370000000 HC RX 637 (ALT 250 FOR IP): Performed by: ORTHOPAEDIC SURGERY

## 2025-02-07 PROCEDURE — 3600000014 HC SURGERY LEVEL 4 ADDTL 15MIN: Performed by: ORTHOPAEDIC SURGERY

## 2025-02-07 PROCEDURE — 2500000003 HC RX 250 WO HCPCS: Performed by: NURSE ANESTHETIST, CERTIFIED REGISTERED

## 2025-02-07 PROCEDURE — 3700000001 HC ADD 15 MINUTES (ANESTHESIA): Performed by: ORTHOPAEDIC SURGERY

## 2025-02-07 PROCEDURE — 64415 NJX AA&/STRD BRCH PLXS IMG: CPT | Performed by: STUDENT IN AN ORGANIZED HEALTH CARE EDUCATION/TRAINING PROGRAM

## 2025-02-07 PROCEDURE — 2709999900 HC NON-CHARGEABLE SUPPLY: Performed by: ORTHOPAEDIC SURGERY

## 2025-02-07 PROCEDURE — 6360000002 HC RX W HCPCS: Performed by: ANESTHESIOLOGY

## 2025-02-07 PROCEDURE — 7100000010 HC PHASE II RECOVERY - FIRST 15 MIN: Performed by: ORTHOPAEDIC SURGERY

## 2025-02-07 PROCEDURE — 2580000003 HC RX 258: Performed by: ORTHOPAEDIC SURGERY

## 2025-02-07 PROCEDURE — 6360000002 HC RX W HCPCS: Performed by: STUDENT IN AN ORGANIZED HEALTH CARE EDUCATION/TRAINING PROGRAM

## 2025-02-07 DEVICE — ANCHOR SUT L14MM DIA4.75MM BIOCOMP W/ 2 1.3MM WHT BLK AND: Type: IMPLANTABLE DEVICE | Site: SHOULDER | Status: FUNCTIONAL

## 2025-02-07 DEVICE — ANCHOR SUTURE L 24.5 MM DIA 4.75 MM SUTURE SZ 2 B-TCP/ PEEK: Type: IMPLANTABLE DEVICE | Site: SHOULDER | Status: FUNCTIONAL

## 2025-02-07 RX ORDER — SODIUM CHLORIDE 0.9 % (FLUSH) 0.9 %
5-40 SYRINGE (ML) INJECTION EVERY 12 HOURS SCHEDULED
Status: CANCELLED | OUTPATIENT
Start: 2025-02-07

## 2025-02-07 RX ORDER — SODIUM CHLORIDE, SODIUM LACTATE, POTASSIUM CHLORIDE, CALCIUM CHLORIDE 600; 310; 30; 20 MG/100ML; MG/100ML; MG/100ML; MG/100ML
INJECTION, SOLUTION INTRAVENOUS CONTINUOUS PRN
Status: COMPLETED | OUTPATIENT
Start: 2025-02-07 | End: 2025-02-07

## 2025-02-07 RX ORDER — DEXAMETHASONE SODIUM PHOSPHATE 4 MG/ML
INJECTION, SOLUTION INTRA-ARTICULAR; INTRALESIONAL; INTRAMUSCULAR; INTRAVENOUS; SOFT TISSUE
Status: DISCONTINUED | OUTPATIENT
Start: 2025-02-07 | End: 2025-02-07 | Stop reason: SDUPTHER

## 2025-02-07 RX ORDER — ROCURONIUM BROMIDE 10 MG/ML
INJECTION, SOLUTION INTRAVENOUS
Status: DISCONTINUED | OUTPATIENT
Start: 2025-02-07 | End: 2025-02-07 | Stop reason: SDUPTHER

## 2025-02-07 RX ORDER — ACETAMINOPHEN 325 MG/1
1000 TABLET ORAL ONCE
Status: CANCELLED | OUTPATIENT
Start: 2025-02-07 | End: 2025-02-07

## 2025-02-07 RX ORDER — SODIUM CHLORIDE 0.9 % (FLUSH) 0.9 %
5-40 SYRINGE (ML) INJECTION EVERY 12 HOURS SCHEDULED
Status: DISCONTINUED | OUTPATIENT
Start: 2025-02-07 | End: 2025-02-07 | Stop reason: HOSPADM

## 2025-02-07 RX ORDER — EPINEPHRINE 1 MG/ML
INJECTION, SOLUTION INTRAMUSCULAR; SUBCUTANEOUS
Status: DISCONTINUED
Start: 2025-02-07 | End: 2025-02-07 | Stop reason: HOSPADM

## 2025-02-07 RX ORDER — SODIUM CHLORIDE 0.9 % (FLUSH) 0.9 %
5-40 SYRINGE (ML) INJECTION PRN
Status: CANCELLED | OUTPATIENT
Start: 2025-02-07

## 2025-02-07 RX ORDER — SODIUM CHLORIDE 0.9 % (FLUSH) 0.9 %
5-40 SYRINGE (ML) INJECTION PRN
Status: DISCONTINUED | OUTPATIENT
Start: 2025-02-07 | End: 2025-02-07 | Stop reason: HOSPADM

## 2025-02-07 RX ORDER — DEXAMETHASONE SODIUM PHOSPHATE 10 MG/ML
10 INJECTION, SOLUTION INTRAMUSCULAR; INTRAVENOUS ONCE
Status: DISCONTINUED | OUTPATIENT
Start: 2025-02-07 | End: 2025-02-07 | Stop reason: HOSPADM

## 2025-02-07 RX ORDER — HYDRALAZINE HYDROCHLORIDE 20 MG/ML
10 INJECTION INTRAMUSCULAR; INTRAVENOUS
Status: DISCONTINUED | OUTPATIENT
Start: 2025-02-07 | End: 2025-02-07 | Stop reason: HOSPADM

## 2025-02-07 RX ORDER — FENTANYL CITRATE 50 UG/ML
100 INJECTION, SOLUTION INTRAMUSCULAR; INTRAVENOUS
Status: COMPLETED | OUTPATIENT
Start: 2025-02-07 | End: 2025-02-07

## 2025-02-07 RX ORDER — SODIUM CHLORIDE 9 MG/ML
INJECTION, SOLUTION INTRAVENOUS PRN
Status: DISCONTINUED | OUTPATIENT
Start: 2025-02-07 | End: 2025-02-07 | Stop reason: HOSPADM

## 2025-02-07 RX ORDER — ACETAMINOPHEN 500 MG
1000 TABLET ORAL ONCE
Status: COMPLETED | OUTPATIENT
Start: 2025-02-07 | End: 2025-02-07

## 2025-02-07 RX ORDER — LIDOCAINE HYDROCHLORIDE 10 MG/ML
INJECTION, SOLUTION EPIDURAL; INFILTRATION; INTRACAUDAL; PERINEURAL
Status: DISCONTINUED | OUTPATIENT
Start: 2025-02-07 | End: 2025-02-07 | Stop reason: SDUPTHER

## 2025-02-07 RX ORDER — LABETALOL HYDROCHLORIDE 5 MG/ML
10 INJECTION, SOLUTION INTRAVENOUS
Status: DISCONTINUED | OUTPATIENT
Start: 2025-02-07 | End: 2025-02-07 | Stop reason: HOSPADM

## 2025-02-07 RX ORDER — KETOROLAC TROMETHAMINE 30 MG/ML
INJECTION, SOLUTION INTRAMUSCULAR; INTRAVENOUS
Status: DISCONTINUED | OUTPATIENT
Start: 2025-02-07 | End: 2025-02-07 | Stop reason: SDUPTHER

## 2025-02-07 RX ORDER — SCOPOLAMINE 1 MG/3D
1 PATCH, EXTENDED RELEASE TRANSDERMAL
Status: DISCONTINUED | OUTPATIENT
Start: 2025-02-07 | End: 2025-02-07 | Stop reason: HOSPADM

## 2025-02-07 RX ORDER — SODIUM CHLORIDE 9 MG/ML
INJECTION, SOLUTION INTRAVENOUS PRN
Status: CANCELLED | OUTPATIENT
Start: 2025-02-07

## 2025-02-07 RX ORDER — PROPOFOL 10 MG/ML
INJECTION, EMULSION INTRAVENOUS
Status: DISCONTINUED | OUTPATIENT
Start: 2025-02-07 | End: 2025-02-07 | Stop reason: SDUPTHER

## 2025-02-07 RX ORDER — MIDAZOLAM HYDROCHLORIDE 2 MG/2ML
2 INJECTION, SOLUTION INTRAMUSCULAR; INTRAVENOUS
Status: COMPLETED | OUTPATIENT
Start: 2025-02-07 | End: 2025-02-07

## 2025-02-07 RX ORDER — BUPIVACAINE HYDROCHLORIDE 5 MG/ML
INJECTION, SOLUTION EPIDURAL; INTRACAUDAL
Status: COMPLETED | OUTPATIENT
Start: 2025-02-07 | End: 2025-02-07

## 2025-02-07 RX ORDER — PROCHLORPERAZINE EDISYLATE 5 MG/ML
5 INJECTION INTRAMUSCULAR; INTRAVENOUS
Status: DISCONTINUED | OUTPATIENT
Start: 2025-02-07 | End: 2025-02-07 | Stop reason: HOSPADM

## 2025-02-07 RX ORDER — HYDROCODONE BITARTRATE AND ACETAMINOPHEN 5; 325 MG/1; MG/1
1 TABLET ORAL EVERY 4 HOURS PRN
Qty: 42 TABLET | Refills: 0 | Status: SHIPPED | OUTPATIENT
Start: 2025-02-07 | End: 2025-02-07

## 2025-02-07 RX ORDER — NALOXONE HYDROCHLORIDE 0.4 MG/ML
INJECTION, SOLUTION INTRAMUSCULAR; INTRAVENOUS; SUBCUTANEOUS PRN
Status: DISCONTINUED | OUTPATIENT
Start: 2025-02-07 | End: 2025-02-07 | Stop reason: HOSPADM

## 2025-02-07 RX ORDER — ONDANSETRON 2 MG/ML
INJECTION INTRAMUSCULAR; INTRAVENOUS
Status: DISCONTINUED | OUTPATIENT
Start: 2025-02-07 | End: 2025-02-07 | Stop reason: SDUPTHER

## 2025-02-07 RX ORDER — SODIUM CHLORIDE, SODIUM LACTATE, POTASSIUM CHLORIDE, CALCIUM CHLORIDE 600; 310; 30; 20 MG/100ML; MG/100ML; MG/100ML; MG/100ML
INJECTION, SOLUTION INTRAVENOUS
Status: DISCONTINUED | OUTPATIENT
Start: 2025-02-07 | End: 2025-02-07 | Stop reason: SDUPTHER

## 2025-02-07 RX ORDER — HYDROCODONE BITARTRATE AND ACETAMINOPHEN 5; 325 MG/1; MG/1
1 TABLET ORAL EVERY 4 HOURS PRN
Qty: 42 TABLET | Refills: 0 | Status: SHIPPED | OUTPATIENT
Start: 2025-02-07 | End: 2025-02-14

## 2025-02-07 RX ADMIN — SUGAMMADEX 100 MG: 100 INJECTION, SOLUTION INTRAVENOUS at 16:47

## 2025-02-07 RX ADMIN — Medication 2000 MG: at 14:23

## 2025-02-07 RX ADMIN — SODIUM CHLORIDE, POTASSIUM CHLORIDE, SODIUM LACTATE AND CALCIUM CHLORIDE: 600; 310; 30; 20 INJECTION, SOLUTION INTRAVENOUS at 15:52

## 2025-02-07 RX ADMIN — MIDAZOLAM HYDROCHLORIDE 2 MG: 1 INJECTION, SOLUTION INTRAMUSCULAR; INTRAVENOUS at 12:26

## 2025-02-07 RX ADMIN — ONDANSETRON 4 MG: 2 INJECTION INTRAMUSCULAR; INTRAVENOUS at 16:47

## 2025-02-07 RX ADMIN — ROCURONIUM BROMIDE 50 MG: 10 INJECTION, SOLUTION INTRAVENOUS at 14:15

## 2025-02-07 RX ADMIN — BUPIVACAINE HYDROCHLORIDE 20 ML: 5 INJECTION, SOLUTION EPIDURAL; INTRACAUDAL; PERINEURAL at 12:29

## 2025-02-07 RX ADMIN — ACETAMINOPHEN 1000 MG: 500 TABLET ORAL at 11:56

## 2025-02-07 RX ADMIN — PROPOFOL 150 MG: 10 INJECTION, EMULSION INTRAVENOUS at 14:15

## 2025-02-07 RX ADMIN — SODIUM CHLORIDE, POTASSIUM CHLORIDE, SODIUM LACTATE AND CALCIUM CHLORIDE: 600; 310; 30; 20 INJECTION, SOLUTION INTRAVENOUS at 14:12

## 2025-02-07 RX ADMIN — KETOROLAC TROMETHAMINE 30 MG: 30 INJECTION, SOLUTION INTRAMUSCULAR at 16:47

## 2025-02-07 RX ADMIN — LIDOCAINE HYDROCHLORIDE 50 MG: 10 INJECTION, SOLUTION EPIDURAL; INFILTRATION; INTRACAUDAL; PERINEURAL at 14:15

## 2025-02-07 RX ADMIN — FENTANYL CITRATE 100 MCG: 50 INJECTION, SOLUTION INTRAMUSCULAR; INTRAVENOUS at 12:26

## 2025-02-07 RX ADMIN — DEXAMETHASONE SODIUM PHOSPHATE 4 MG: 4 INJECTION INTRA-ARTICULAR; INTRALESIONAL; INTRAMUSCULAR; INTRAVENOUS; SOFT TISSUE at 14:23

## 2025-02-07 RX ADMIN — SUGAMMADEX 100 MG: 100 INJECTION, SOLUTION INTRAVENOUS at 16:43

## 2025-02-07 RX ADMIN — BUPIVACAINE 10 ML: 13.3 INJECTION, SUSPENSION, LIPOSOMAL INFILTRATION at 12:29

## 2025-02-07 ASSESSMENT — PAIN SCALES - GENERAL
PAINLEVEL_OUTOF10: 4
PAINLEVEL_OUTOF10: 0

## 2025-02-07 ASSESSMENT — PAIN - FUNCTIONAL ASSESSMENT
PAIN_FUNCTIONAL_ASSESSMENT: NONE - DENIES PAIN
PAIN_FUNCTIONAL_ASSESSMENT: NONE - DENIES PAIN

## 2025-02-07 ASSESSMENT — PAIN DESCRIPTION - LOCATION: LOCATION: SHOULDER

## 2025-02-07 ASSESSMENT — PAIN DESCRIPTION - ORIENTATION: ORIENTATION: LEFT

## 2025-02-07 ASSESSMENT — PAIN DESCRIPTION - DESCRIPTORS: DESCRIPTORS: ACHING

## 2025-02-07 NOTE — ANESTHESIA POSTPROCEDURE EVALUATION
Department of Anesthesiology  Postprocedure Note    Patient: Margaret Barbour  MRN: [id number]  YOB: 1965  Date of evaluation: 2/7/2025    Procedure Summary       Date: 02/07/25 Room / Location: [address]    Anesthesia Start: 1412 Anesthesia Stop: 1701    Procedure: LEFT SHOULDER ARTHROSCOPIC ROTATOR CUFF REPAIR AND BICEPS TENODESIS WITH GRAFT AUGMENTATION: ARTHREX AND CONMED PRE OP INTERSCALENE BLOCK WITH EXPAREL (Left) Diagnosis:       Left rotator cuff tear      (Left rotator cuff tear [M75.102])    Surgeons: Dorita Winters MD Responsible Provider: Madeline Presley MD    Anesthesia Type: general, regional ASA Status: 2            Anesthesia Type: No value filed.    Andra Phase I: Andra Score: 9    Andra Phase II:      Anesthesia Post Evaluation    Patient location during evaluation: PACU  Patient participation: complete - patient participated  Level of consciousness: awake and awake and alert  Pain score: 0  Nausea & Vomiting: no nausea and no vomiting  Cardiovascular status: hemodynamically stable and blood pressure returned to baseline  Respiratory status: acceptable  Hydration status: euvolemic  Multimodal analgesia pain management approach  Pain management: adequate    No notable events documented.

## 2025-02-07 NOTE — H&P
Update History & Physical    The patient's History and Physical of January 29, 2025 was reviewed with the patient and I examined the patient. There was no change. The surgical site was confirmed by the patient and me.   Heart: RRR  Lungs: CTA bilaterally    Plan: The risks, benefits, expected outcome, and alternative to the recommended procedure have been discussed with the patient. Patient understands and wants to proceed with the procedure.     Electronically signed by Dorita Winters MD on 2/7/2025 at 1:55 PM

## 2025-02-07 NOTE — OP NOTE
Operative Report     Date of Procedure: 2/7/2025      Preop Diagnosis: Left Rotator Cuff Tear (M75.122)     Postop Diagnosis:   Left  Rotator Cuff Tear (M75.122)  Left partial biceps tear     Procedure:    Left Shoulder Arthroscopic Rotator Cuff Repair (81219) with Bio Brace Graft Augmentation  Left Shoulder Arthroscopic Biceps Tenodesis    Modifier 22: Given the quality of her rotator cuff tendon and in an effort to improve her chances of healing successfully, additional time and technical expertise was spent and needed to augment her rotator cuff repair using the bio brace graft     Surgeon: Dorita Winters MD     Assistant: No resident present. Jakob Hampton PA-C, medically necessary for patient positioning, surgical site exposure, wound closure, and efficient completion of case.      Anesthesia: General with left interscalene nerve block    Blood Loss: Minimal    Findings: Full-thickness supraspinatus and leading edge infraspinatus rotator cuff tear, intact chondral surfaces and labrum, significant fraying and partial tearing of the biceps tendon, low-grade articular sided partial thickness tear of the subscapularis medial to its insertion.    Specimen: None    Implants: Arthrex 4.75 mm bio composite corkscrew anchors x 2 and self punching/peek tipped 4.75 mm bio composite swivel lock anchors x 2.  Bio brace graft.     Indications: Margaret Barbour is a 60 y.o. old female who presented with functionally limiting left shoulder pain stemming from an acute traumatic injury. MRI confirmed the presence of a rotator cuff tear. Following a discussion with the patient regarding her treatment options, she elected to proceed with an arthroscopic left shoulder rotator cuff repair with possible graft augmentation. she came to this decision after demonstrating a good understanding of our discussion. Risks, benefits, and alternatives were fully discussed. Postoperative limitations and limitations of the procedure were discussed

## 2025-02-07 NOTE — DISCHARGE INSTRUCTIONS
post-operative visit.    EMERGENCIES  Contact Dr. Winters at [phone] if any of the following are present:  Painful swelling or numbness, Unrelenting pain, Fever (over 101 - it is normal to have a low grade fever for the first day or two following surgery) or chills, Redness around incisions, Color change in hand or fingers, Continuous drainage or bleeding from incision (a small amount of drainage is expected), Difficulty breathing, excessive nausea/vomiting  **If you have an emergency after office hours or on the weekend, contact the same office number ([phone]) and you will be connected to our page service - they will contact Dr. Winters or his partner if he is unavailable.   **If you have an emergency that requires immediate attention, proceed to the nearest emergency room.    FOLLOW-UP CARE / QUESTIONS  If you have any questions/concerns, please call the office [phone].  Contact the office to confirm your post-operative visit, which has been scheduled for two weeks following the date of surgery.          scopolamine Patch Home Instructions    1.  Remove Scopolamine patch behind ear in 3 days or sooner at your discretion (rec in 24 hrs)  2.  Wash hands before and after removing patch-use tissue to remove  3.  This medication may cause headaches, dry mouth and/or dilated pupils       Activity  You have had anesthesia today  Do not drive, operate heavy equipment, consume alcoholic beverages, or make any important decisions  for 24 hours   If you are taking pain medication: Do not drive or consume alcohol.  Take your time changing positions today. You may feel light headed or dizzy if you move too quickly.   Continue your home medications as ordered by your physician.  Diet   You can eat your normal diet when you feel well. You should start off with bland foods like chicken soup, toast, or yogurt. Then advance as tolerated.  Drink plenty of fluids (unless your doctor tells you not to). Your urine should be

## 2025-02-07 NOTE — ANESTHESIA PRE PROCEDURE
Department of Anesthesiology  Preprocedure Note       Name:  Margaret Barbour   Age:  60 y.o.  :  1965                                          MRN:  [id number]         Date:  2025      Surgeon: Surgeon(s):  Dorita Winters MD    Procedure: Procedure(s):  LEFT SHOULDER ARTHROSCOPIC ROTATOR CUFF REPAIR WITH ARTHREX AND CONMED PRE OP INTERSCALENE BLOCK WITH EXPAREL POSSIBLE GRAFT AUGMENTATION    Medications prior to admission:   Prior to Admission medications    Medication Sig Start Date End Date Taking? Authorizing Provider   NONFORMULARY Take by mouth Black seed liquid - daily   Yes ProviderMary MD   L-METHYLFOLATE CALCIUM PO Take by mouth daily   Yes ProviderMary MD   pantoprazole (PROTONIX) 40 MG tablet  22  Yes Mary Tavarez MD   turmeric 500 MG CAPS Take 2 capsules by mouth daily   Yes Mary Tavarez MD   acetaminophen (TYLENOL) 325 MG tablet Take 2 tablets by mouth every 6 hours as needed for Pain   Yes Mary Tavarez MD   amoxicillin (AMOXIL) 500 MG tablet Take 4 tablets by mouth as needed (dental prophylaxis) Take Amoxicillin 4 tabs (2 grams) 30-60 mins prior to dental procedure/cleaning or invasive procedure for Orthopedic hardware infection prophylaxis.  Patient not taking: Reported on 2025   Barbara Durbin PA-C       Current medications:    Current Facility-Administered Medications   Medication Dose Route Frequency Provider Last Rate Last Admin    fentaNYL (SUBLIMAZE) injection 100 mcg  100 mcg IntraVENous Once PRN Debbie Rodriguez MD        scopolamine (TRANSDERM-SCOP) transdermal patch 1 patch  1 patch TransDERmal Q72H Debbie Rodriguez MD   1 patch at 25 1154    sodium chloride flush 0.9 % injection 5-40 mL  5-40 mL IntraVENous 2 times per day Debbie Rodriguez MD        sodium chloride flush 0.9 % injection 5-40 mL  5-40 mL IntraVENous PRN Debbie Rodriguez MD        0.9 % sodium chloride infusion   IntraVENous PRN Debbie Rodriguez MD        midazolam PF (VERSED)

## 2025-02-07 NOTE — ANESTHESIA PROCEDURE NOTES
Peripheral Block    Patient location during procedure: pre-op  Reason for block: post-op pain management and at surgeon's request  Start time: 2/7/2025 12:29 PM  End time: 2/7/2025 12:31 PM  Staffing  Performed: anesthesiologist   Anesthesiologist: Madeline Presley MD  Performed by: Madeline Presley MD  Authorized by: Madeline Presley MD    Preanesthetic Checklist  Completed: patient identified, IV checked, site marked, risks and benefits discussed, surgical/procedural consents, equipment checked, pre-op evaluation, timeout performed, anesthesia consent given, oxygen available, monitors applied/VS acknowledged, fire risk safety assessment completed and verbalized and blood product R/B/A discussed and consented  Peripheral Block   Patient position: supine  Prep: ChloraPrep  Provider prep: sterile gloves and mask  Patient monitoring: continuous pulse ox, continuous capnometry, frequent blood pressure checks, IV access, oxygen, responsive to questions and cardiac monitor  Block type: Brachial plexus  Interscalene  Laterality: left  Injection technique: single-shot  Guidance: paresthesia technique and ultrasound guided    Needle   Needle type: insulated echogenic nerve stimulator needle   Needle gauge: 21 G  Needle localization: anatomical landmarks, ultrasound guidance and paresthesias  Needle length: 5 cm  Assessment   Injection assessment: negative aspiration for heme, no paresthesia on injection, local visualized surrounding nerve on ultrasound and no intravascular symptoms  Paresthesia pain: none  Slow fractionated injection: yes  Hemodynamics: stable  Outcomes: uncomplicated and patient tolerated procedure well    Medications Administered  BUPivacaine (MARCAINE) PF injection 0.5% - Perineural   20 mL - 2/7/2025 12:29:00 PM  BUPivacaine liposome (EXPAREL) injection 1.3% - Perineural   10 mL - 2/7/2025 12:29:00 PM

## 2025-02-21 ENCOUNTER — OFFICE VISIT (OUTPATIENT)
Dept: ORTHOPEDIC SURGERY | Age: 60
End: 2025-02-21

## 2025-02-21 VITALS — HEIGHT: 64 IN | BODY MASS INDEX: 24.24 KG/M2 | RESPIRATION RATE: 14 BRPM | WEIGHT: 142 LBS

## 2025-02-21 DIAGNOSIS — M75.102 TEAR OF LEFT ROTATOR CUFF, UNSPECIFIED TEAR EXTENT, UNSPECIFIED WHETHER TRAUMATIC: Primary | ICD-10-CM

## 2025-02-21 NOTE — PROGRESS NOTES
Procedure: Left shoulder arthroscopic rotator cuff repair with Bio brace graft augmentation, and biceps tenodesis  Date of procedure: 2/7/25    HPI:  Margaret Barbour is a 60 y.o. female who is approximately 2 weeks status post aforementioned procedure.  Indicates that she is doing relatively well.  Her pain is rated as a 0/10.  She denies having any fevers, chills, sweats or any constitutional symptoms.  She has been compliant with her pendulums and sling immobilization.    Physical examination:  Evaluation of patient's left shoulder and upper extremity demonstrates her incisions to be clean, dry, intact.  There is no warmth, erythema, wound dehiscence or drainage.  Sensation is grossly intact light touch in all dermatomes and she has a 2+ radial pulse with brisk capillary refill in her fingers.    Impression and plan:  Margaret Barbour is a 60 y.o. female who is 2 weeks status post an arthroscopic left shoulder rotator cuff repair with Bio brace graft augmentation, and biceps tenodesis.  She is doing relatively well at this time.  We reviewed her arthroscopic images. Her sutures were taken out and Steri-Strips and clean dressings applied.  She may now get her incisions wet in the shower.  She is to continue on with her immobilization.  We'll get her started in formal physical therapy using rehab guidelines for the repair of a large rotator cuff tear; a prescription was provided.  I'll see her back for reevaluation in 4 weeks but she was encouraged to return or call earlier with questions and/or concerns.

## 2025-03-06 ENCOUNTER — HOSPITAL ENCOUNTER (OUTPATIENT)
Dept: PHYSICAL THERAPY | Facility: CLINIC | Age: 60
Setting detail: THERAPIES SERIES
Discharge: HOME | End: 2025-03-06
Attending: ORTHOPAEDIC SURGERY
Payer: COMMERCIAL

## 2025-03-06 PROCEDURE — 97161 PT EVAL LOW COMPLEX 20 MIN: CPT

## 2025-03-06 PROCEDURE — 97110 THERAPEUTIC EXERCISES: CPT

## 2025-03-06 NOTE — CONSULTS
Normal Difficult Unable   Lift/Carry [] [] [x]   Overhead reach [] [] [x]   Groom/Dress [] [] [x]          Functional Test: Quick DASH Score: Raw score of 36/11,  which is 57% functionally impaired          Assessment:    Patient would benefit from skilled physical therapy services in order to improve ROM, flexibility, strength and decrease pain. Patient with limitations as noted in the objective section of the eval above. Plan to address limitations in ROM, flexibility, strength and advance along protocol.       Problems:    [x] ? Pain  [x] ? ROM  [x] ? Strength  [x] ? Function        Goals  MET NOT MET ON-  GOING  Details   Date Addressed:        STG: To be met in 10 treatments           1. ? Pain: Decrease pain levels to 4/10 with ADLs []  []  []      2. ? ROM: Increase UE flexibility and AROM limitations throughout to equal bilat to reduce difficulty with ADLs []  []  []      3. ? Strength: Increase UE MMT to 5/5 throughout to ease functional limitations and mobility  []  []  []     4. Independent with Home Exercise Programs []  []  []     5. Demonstrate knowledge of fall risk prevention  []  []  []      []  []  []     Date Addressed:        LTG: To be met in 20 treatments       1. Improve score on assessment tool Quick DASH from 57% impairment to less than 20% impairment  []  []  []     2. Reduce pain levels to 2/10 or less with ADLs []  []  []      []  []  []                  Patient goals: decrease pain     Rehab Potential:  [x] Good  [] Fair  [] Poor   Suggested Professional Referral:  [x] No  [] Yes:  Barriers to Goal Achievement::  [x] No  [] Yes:  Domestic Concerns:  [] No  [] Yes:    Education      Yes No    Patient Education Provided today  [x]  []     Reviewed established HEP  []  []  NA         Comprehension of Education:         Verbalizes Understanding  [x]  []      Demonstrates understanding [x]  []      Needs review [x]  []      Demonstrates/verbalizes HEP/  Education previously given []  []   NA

## 2025-03-17 NOTE — PROGRESS NOTES
Annual  Last pap 08/29/2019 Normal neg HPV  MMG 12/16/24  Patient has no concerns  Patient was given screening sheets  Patient did not leave urine states no problems with that    Chaperone for Intimate Exam  Chaperone was offered as part of the rooming process. Patient declined and agrees to continue with exam without a chaperone.

## 2025-03-18 ENCOUNTER — OFFICE VISIT (OUTPATIENT)
Dept: OBGYN CLINIC | Age: 60
End: 2025-03-18
Payer: COMMERCIAL

## 2025-03-18 ENCOUNTER — HOSPITAL ENCOUNTER (OUTPATIENT)
Age: 60
Setting detail: SPECIMEN
Discharge: HOME | End: 2025-03-18

## 2025-03-18 VITALS
BODY MASS INDEX: 23.83 KG/M2 | OXYGEN SATURATION: 94 % | SYSTOLIC BLOOD PRESSURE: 122 MMHG | DIASTOLIC BLOOD PRESSURE: 82 MMHG | WEIGHT: 139.6 LBS | HEIGHT: 64 IN | HEART RATE: 60 BPM

## 2025-03-18 DIAGNOSIS — Z12.31 ENCOUNTER FOR SCREENING MAMMOGRAM FOR MALIGNANT NEOPLASM OF BREAST: ICD-10-CM

## 2025-03-18 DIAGNOSIS — Z12.4 CERVICAL CANCER SCREENING: ICD-10-CM

## 2025-03-18 DIAGNOSIS — R39.15 URINARY URGENCY: ICD-10-CM

## 2025-03-18 DIAGNOSIS — R35.0 URINARY FREQUENCY: ICD-10-CM

## 2025-03-18 DIAGNOSIS — Z01.419 WELL WOMAN EXAM WITH ROUTINE GYNECOLOGICAL EXAM: Primary | ICD-10-CM

## 2025-03-18 DIAGNOSIS — Z12.11 COLON CANCER SCREENING: ICD-10-CM

## 2025-03-18 PROCEDURE — 99386 PREV VISIT NEW AGE 40-64: CPT | Performed by: ADVANCED PRACTICE MIDWIFE

## 2025-03-18 PROCEDURE — 99459 PELVIC EXAMINATION: CPT | Performed by: ADVANCED PRACTICE MIDWIFE

## 2025-03-18 SDOH — ECONOMIC STABILITY: FOOD INSECURITY: WITHIN THE PAST 12 MONTHS, THE FOOD YOU BOUGHT JUST DIDN'T LAST AND YOU DIDN'T HAVE MONEY TO GET MORE.: NEVER TRUE

## 2025-03-18 SDOH — ECONOMIC STABILITY: FOOD INSECURITY: WITHIN THE PAST 12 MONTHS, YOU WORRIED THAT YOUR FOOD WOULD RUN OUT BEFORE YOU GOT MONEY TO BUY MORE.: NEVER TRUE

## 2025-03-18 ASSESSMENT — PATIENT HEALTH QUESTIONNAIRE - PHQ9
SUM OF ALL RESPONSES TO PHQ QUESTIONS 1-9: 1
1. LITTLE INTEREST OR PLEASURE IN DOING THINGS: NOT AT ALL
SUM OF ALL RESPONSES TO PHQ QUESTIONS 1-9: 1
2. FEELING DOWN, DEPRESSED OR HOPELESS: SEVERAL DAYS

## 2025-03-18 ASSESSMENT — ANXIETY QUESTIONNAIRES
3. WORRYING TOO MUCH ABOUT DIFFERENT THINGS: NOT AT ALL
GAD7 TOTAL SCORE: 1
2. NOT BEING ABLE TO STOP OR CONTROL WORRYING: NOT AT ALL
4. TROUBLE RELAXING: NOT AT ALL
7. FEELING AFRAID AS IF SOMETHING AWFUL MIGHT HAPPEN: NOT AT ALL
5. BEING SO RESTLESS THAT IT IS HARD TO SIT STILL: NOT AT ALL
IF YOU CHECKED OFF ANY PROBLEMS ON THIS QUESTIONNAIRE, HOW DIFFICULT HAVE THESE PROBLEMS MADE IT FOR YOU TO DO YOUR WORK, TAKE CARE OF THINGS AT HOME, OR GET ALONG WITH OTHER PEOPLE: NOT DIFFICULT AT ALL
6. BECOMING EASILY ANNOYED OR IRRITABLE: NOT AT ALL
1. FEELING NERVOUS, ANXIOUS, OR ON EDGE: SEVERAL DAYS

## 2025-03-18 NOTE — PROGRESS NOTES
[facility] OBSTETRICS AND GYNECOLOGY  [address]   [address]  [place] [id number]  Dept: [phone]  Annual Exam  Patient Name: Margaret Barbour  Patient : 1965  MRN #: [id number]  CSN #: [id number]    Date: 3/18/2025               Primary Care Physician: Alyssa Lovett MD        HPI : Margaret Barbour is a 60 y.o. female  here for an annual exam. No LMP recorded (lmp unknown). Patient is postmenopausal.  The patient was seen and examined. She has no chief complaint today and is here for her annual exam.     Recently had shoulder surgery- still in sling and starting PT.   She is sexually active with male partners. She has had 1 sexual partners in the last 12 months. She denies use condoms.     Hotflashes- No   Vaginal dryness- Yes, uses lubricant for intercourse, otherwise not bothersome  Pain with sex- No   Urine leakage-Yes urinary urgency w/ little output  Mood irritability- No   Insomnia-No   Joint pain- No   Symptoms of decreased mood absent  Symptoms of anhedonia absent    Preventive Health Screening:   Date of last pap:   Cytology Report   Date Value Ref Range Status   2019       [id number]  [facility]  [facility]  ANATOMIC PATHOLOGY  [address] [place] [address].  [place], [place] [address] [phone]  Fax: [phone]  GYNECOLOGIC CYTOLOGY REPORT    Patient Name: MARGARET BARBOUR  MR#: [id number]  Specimen #[id number]  Source:  1: Cervical material, (ThinPrep vial, Imaging-assisted review)    Clinical History  Z01.419 Routine gyn exam without abnormal findings  Co-Test:  ThinPrep Pap with high risk HPV testing    INTERPRETATION    Cervical material, (ThinPrep vial, Imaging-assisted review):  Specimen Adequacy:       Satisfactory for evaluation.  Descriptive Diagnosis:       Negative for intraepithelial lesion or malignancy.   Comments:       High Risk HPV testing was

## 2025-03-19 ENCOUNTER — OFFICE VISIT (OUTPATIENT)
Dept: ORTHOPEDIC SURGERY | Age: 60
End: 2025-03-19

## 2025-03-19 VITALS — WEIGHT: 139 LBS | BODY MASS INDEX: 23.73 KG/M2 | HEIGHT: 64 IN

## 2025-03-19 DIAGNOSIS — Z98.890 S/P LEFT ROTATOR CUFF REPAIR: Primary | ICD-10-CM

## 2025-03-19 PROCEDURE — 1073RET COMPLETION OF WORKABILITY PRESCRIPTION

## 2025-03-19 PROCEDURE — 99024 POSTOP FOLLOW-UP VISIT: CPT

## 2025-03-19 NOTE — PROGRESS NOTES
Procedure: Right shoulder arthroscopic rotator cuff repair and biceps tenodesis with graft augmentation  Date of procedure: 2/7/2025    HPI: Margaret Barbour is a 60 y.o. female who is approximately 6 weeks status post the aforementioned procedure.  She indicates that she is doing relatively well.  She  has remained compliant with her immobilization most of the time.  She has only attended 1 session of physical therapy as she states she was not doing much so she stopped going for that now.  Pain is rated at 0/10.  Not taking any medications for pain control at this time and states that nothing she is doing currently is causing pain.    Physical examination:  Ht 1.626 m (5' 4.02\")   Wt 63 kg (139 lb)   LMP  (LMP Unknown)   BMI 23.85 kg/m²   Evaluation of patient's left shoulder and upper extremity demonstrates her incisions to be healed appropriately.  There is minimal to no swelling at this time.  Sensation is grossly intact light touch in all dermatomes and she has a 2+ radial pulse.    Impression and plan: Margaret Barbour is a 60 y.o. female  who is approximately 6 weeks status post right shoulder arthroscopic rotator cuff repair and biceps tenodesis with graft augmentation.  She is doing relatively well at this time. She may now discontinue her sling and will continue with physical therapy at this time which will be focused on active/active assisted range of motion and avoiding any strengthening at this time.  She may use the extremity for light activities of daily living not to exceed lifting, pushing or pulling more than a pound.  We will see her back my clinic in 6 weeks for reevaluation but she was encouraged to return or call earlier with questions and/or concerns.

## 2025-03-20 LAB
HPV I/H RISK 4 DNA CVX QL NAA+PROBE: NOT DETECTED
HPV SAMPLE: NORMAL
HPV, INTERPRETATION: NORMAL
HPV16 DNA CVX QL NAA+PROBE: NOT DETECTED
HPV18 DNA CVX QL NAA+PROBE: NOT DETECTED
SPECIMEN DESCRIPTION: NORMAL

## 2025-03-21 ENCOUNTER — HOSPITAL ENCOUNTER (OUTPATIENT)
Dept: PHYSICAL THERAPY | Facility: CLINIC | Age: 60
Setting detail: THERAPIES SERIES
Discharge: HOME | End: 2025-03-21
Attending: ORTHOPAEDIC SURGERY
Payer: COMMERCIAL

## 2025-03-21 PROCEDURE — 97110 THERAPEUTIC EXERCISES: CPT

## 2025-03-21 PROCEDURE — 97140 MANUAL THERAPY 1/> REGIONS: CPT

## 2025-03-21 NOTE — FLOWSHEET NOTE
weekly - 3 sets - 10 reps - 5 (sec) hold  - Seated Isometric Elbow Extension  - 1 x daily - 7 x weekly - 3 sets - 10 reps - 5 (sec) hold        Education      Yes No    Patient Education Provided today  [x]  []     Reviewed established HEP  [x]  []           Comprehension of Education:         Verbalizes Understanding  [x]  []      Demonstrates understanding [x]  []      Needs review [x]  []      Demonstrates/verbalizes HEP/Ed previously given [x]  []            Specific education given  [x]  []             Medbridge Program for HEP Given [x]  []  Access Code:   [email or web address]     NeuroNascent updated as per exercise log today  []  []               Plan: [x] Continue current frequency toward long and short term goals.          Time In:1005            Time Out: 1050    Electronically signed by:  Mathew Billingsley PT

## 2025-03-28 ENCOUNTER — HOSPITAL ENCOUNTER (OUTPATIENT)
Dept: PHYSICAL THERAPY | Facility: CLINIC | Age: 60
Setting detail: THERAPIES SERIES
Discharge: HOME | End: 2025-03-28
Attending: ORTHOPAEDIC SURGERY
Payer: COMMERCIAL

## 2025-03-28 PROCEDURE — 97140 MANUAL THERAPY 1/> REGIONS: CPT

## 2025-03-28 PROCEDURE — 97110 THERAPEUTIC EXERCISES: CPT

## 2025-03-28 NOTE — FLOWSHEET NOTE
[facility]  Outpatient Rehabilitation &  Therapy  [address]   P: [phone]  F: [phone]      Physical Therapy Daily Treatment Note    Date:  3/28/2025  Patient Name:  Margaret Barbour    :  1965  MRN: [id number]  Referring Provider: Dr Winters                         Insurance: [facility] 30v  Medical Diagnosis: M75.102 Tear of left rotator cuff, unspecified tear extent  Rehab Codes: M62.81 (Muscle Weakness), M62.9 (Disorder of Muscle), M79.1 (Myalgia), Z73.6   Onset Date: 25, DOS 25                                             Next 's appt.: 25  Visit# / total visits: 3/20     Cancels/No Shows: 0/0      Subjective:    Pain:  [] Yes  [x] No Location: LUE shoulder  Pain Rating: (0-10 scale) 0/10  Pain altered Tx:  [] No  [] Yes  Action:  Comments: Pt with no pain noted in the left shoulder, reports she is having no pain       Objective:    Precautions: RTC Repair Large  DOS 25  Passive ER in plane of the scapula: 45 degrees  Passive ER at 60 degrees abduction: 45 degrees  Passive shoulder flexion 90 degrees    Exercise      LUE RTC repair   3/28 post-op wk 7 Reps/ Time Weight/ Level Comments             Pulleys   5'       Pendulum   HEP             Wand ER seated PROM  x10   Gentle    Shoulder retraction x10       Shoulder depression x10       Thoracic extension         Towel IR stretch  Week 10              Isometrics:         Elbow flex  5\"x10       Elbow ext  5\"x10       GH   week 10                 Mat:         Wand flexion x10     Wand ER x10     Table slide flexion  x10     Table slide Scaption x10                  AAROM         Wall walk flexion  x10       Wall walk Scaption   x10           Standing       Shoulder extension   x10     Shoulder ER   x10                       AAROM LUE RTC:  95 flexion  20 ER        Treatment Charges: Mins Units   Ther Exercise 30 2   Manual Therapy 10 1   Vasocompression     Neuro Re-ed     Ther Activity      Gait

## 2025-03-28 NOTE — PATIENT INSTRUCTIONS
PATIENTIQ:  PatientIQ helps [facility] stay in touch with you to know how you're feeling, and provides education and care instructions to you at various time points.   Your answers help your care team track your progress to provide the best care possible. PatientIQ will contact you pre-op and post-op via email or text with:  Educational Videos and Care Instructions  Questionnaires About How You're Feeling    Your participation provides you valuable education and helps [facility] continue to provide quality care to all patients. Thank you

## 2025-03-31 ENCOUNTER — TELEPHONE (OUTPATIENT)
Dept: ORTHOPEDIC SURGERY | Age: 60
End: 2025-03-31

## 2025-03-31 NOTE — TELEPHONE ENCOUNTER
Patient need antibiotics for dentist appointment on 4/3/25 Thursday  at 4 pm     [id number] - [place], [place] - [address] -  [phone] -  [phone] [[id number]]

## 2025-03-31 NOTE — TELEPHONE ENCOUNTER
Dr Winters did LEFT SHOULDER ARTHROSCOPIC ROTATOR CUFF REPAIR AND BICEPS TENODESIS WITH GRAFT AUGMENTATION: ARTHREX AND CONMED PRE OP INTERSCALENE BLOCK WITH EXPAREL DOS 2/7/25

## 2025-04-01 ENCOUNTER — RESULTS FOLLOW-UP (OUTPATIENT)
Dept: OBGYN CLINIC | Age: 60
End: 2025-04-01

## 2025-04-01 DIAGNOSIS — Z96.653 HISTORY OF BILATERAL KNEE REPLACEMENT: Primary | ICD-10-CM

## 2025-04-01 DIAGNOSIS — T84.033D MECHANICAL LOOSENING OF INTERNAL LEFT KNEE PROSTHETIC JOINT, SUBSEQUENT ENCOUNTER: ICD-10-CM

## 2025-04-01 DIAGNOSIS — T84.032D MECHANICAL LOOSENING OF INTERNAL RIGHT KNEE PROSTHETIC JOINT, SUBSEQUENT ENCOUNTER: ICD-10-CM

## 2025-04-01 LAB — CYTOLOGY REPORT: NORMAL

## 2025-04-01 RX ORDER — AMOXICILLIN 500 MG/1
CAPSULE ORAL
Qty: 8 CAPSULE | Refills: 0 | Status: SHIPPED | OUTPATIENT
Start: 2025-04-01

## 2025-04-01 NOTE — TELEPHONE ENCOUNTER
Patient does not require antibiotic for shoulder surgery per Dr. Winters's dental protocol. Appears patient was calling in regards to knees as she has history of TKAs by Dr. Mejia. Previously evaluated by Barbara Monterroso PA-C for knees so message will be routed back to [place] pool.

## 2025-04-01 NOTE — TELEPHONE ENCOUNTER
Patient is requesting antibiotic  DX: Dr Mejia RIGHT KNEE TOTAL ARTHROPLASTY REVISION        S&N LEGION        FROZEN SECTION WITH PATHOLOGY DOS 9/29/2020  Dental appointment on 4/3/25 Thursday at 4 pm.  No Allergies  LOV: 10/24/24  Next appt: 4/3/25

## 2025-04-02 NOTE — PROGRESS NOTES
[facility] ORTHOPEDICS AND SPORTS MEDICINE  [address]  [place] [address]  Dept: [phone]  Dept Fax: [phone]        Bilateral knees-follow-up      Subjective:   Margaret Barbour is a 60 y.o. year old female who presents to our office today for routine followup regarding her   1. History of bilateral knee replacement    2. Mechanical loosening of internal right knee prosthetic joint, subsequent encounter    3. Mechanical loosening of internal left knee prosthetic joint, subsequent encounter    .    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    A fall occurred on 01/20/2025, resulting in a shoulder injury. No direct impact on the knees was reported during the incident. Approximately 1.5 weeks ago, pain was experienced in the right knee while ascending stairs. No changes in gait or other alterations have been noted since the last consultation. Norco has been prescribed but has not been utilized for knee or shoulder pain. One dose was taken last night due to severe back pain, attributed to prolonged standing and sitting. Overall, she reports feeling well today.    Shoulder surgery was performed 8 weeks ago by Dr. Winters. Return to work has not yet occurred. Disability paperwork was received a week ago, but the next appointment with Dr. Winters is scheduled for the end of 04/2025. Uncertainty remains about returning to work until a release is obtained from him. Approval for 12 weeks off has been granted. During the visit on 03/19/2025, Dr. Winters advised against strengthening exercises until week 12 and indicated that work could resume thereafter. Being right-handed, she can approach tasks from that side to avoid strain. Typically,

## 2025-04-03 ENCOUNTER — OFFICE VISIT (OUTPATIENT)
Dept: ORTHOPEDIC SURGERY | Age: 60
End: 2025-04-03

## 2025-04-03 VITALS — BODY MASS INDEX: 23.9 KG/M2 | HEIGHT: 64 IN | RESPIRATION RATE: 16 BRPM | WEIGHT: 140 LBS | OXYGEN SATURATION: 98 %

## 2025-04-03 DIAGNOSIS — Z96.653 HISTORY OF BILATERAL KNEE REPLACEMENT: Primary | ICD-10-CM

## 2025-04-03 DIAGNOSIS — T84.032D MECHANICAL LOOSENING OF INTERNAL RIGHT KNEE PROSTHETIC JOINT, SUBSEQUENT ENCOUNTER: ICD-10-CM

## 2025-04-03 DIAGNOSIS — T84.033D MECHANICAL LOOSENING OF INTERNAL LEFT KNEE PROSTHETIC JOINT, SUBSEQUENT ENCOUNTER: ICD-10-CM

## 2025-04-03 RX ORDER — ALENDRONATE SODIUM 70 MG/1
TABLET ORAL
COMMUNITY
Start: 2025-02-03

## 2025-04-03 ASSESSMENT — ENCOUNTER SYMPTOMS
ABDOMINAL PAIN: 0
RESPIRATORY NEGATIVE: 1
CHEST TIGHTNESS: 0
COLOR CHANGE: 0
CONSTIPATION: 0
VOMITING: 0
SHORTNESS OF BREATH: 0
APNEA: 0
COUGH: 0
GASTROINTESTINAL NEGATIVE: 1
DIARRHEA: 0
ABDOMINAL DISTENTION: 0
NAUSEA: 0

## 2025-04-04 ENCOUNTER — HOSPITAL ENCOUNTER (OUTPATIENT)
Dept: PHYSICAL THERAPY | Facility: CLINIC | Age: 60
Setting detail: THERAPIES SERIES
Discharge: HOME | End: 2025-04-04
Attending: ORTHOPAEDIC SURGERY
Payer: COMMERCIAL

## 2025-04-04 LAB — NONINV COLON CA DNA+OCC BLD SCRN STL QL: NEGATIVE

## 2025-04-04 PROCEDURE — 97110 THERAPEUTIC EXERCISES: CPT

## 2025-04-04 NOTE — FLOWSHEET NOTE
as per flow sheet, reviewed her HEP. Patient with good understanding.     [] No change.     [] Other:  [x] Patient would benefit from skilled physical therapy services in order to improve ROM, flexibility, strength and decrease pain. Patient with limitations as noted in the objective section of the eval above. Plan to address limitations in ROM, flexibility, strength and advance along protocol.        STG/LTG:    Goals  MET NOT MET ON-  GOING  Details   Date Addressed:            STG: To be met in 10 treatments            1. ? Pain: Decrease pain levels to 4/10 with ADLs []  []  []      2. ? ROM: Increase UE flexibility and AROM limitations throughout to equal bilat to reduce difficulty with ADLs []  []  []      3. ? Strength: Increase UE MMT to 5/5 throughout to ease functional limitations and mobility  []  []  []      4. Independent with Home Exercise Programs []  []  []      5. Demonstrate knowledge of fall risk prevention  []  []  []        []  []  []      Date Addressed:            LTG: To be met in 20 treatments           1. Improve score on assessment tool Quick DASH from 57% impairment to less than 20% impairment  []  []  []  [id number]    2. Reduce pain levels to 2/10 or less with ADLs []  []  []        []  []  []                   Access Code: [email or web address]  URL: [email or web address]  Date: 03/21/2025  Prepared by: Mathew Billingsley    Exercises  - Standing Shoulder Flexion Wall Walk  - 1 x daily - 7 x weekly - 3 sets - 10 reps  - Standing Shoulder Scaption Wall Walk  - 1 x daily - 7 x weekly - 3 sets - 10 reps  - Supine Shoulder Flexion with Dowel to 90  - 1 x daily - 7 x weekly - 3 sets - 10 reps  - Supine Shoulder External Rotation AAROM with Dowel  - 1 x daily - 7 x weekly - 3 sets - 10 reps  - Seated Shoulder Flexion AAROM with Pulley Behind  - 1 x daily - 7 x weekly  - Seated Isometric Elbow Flexion  - 1 x daily - 7 x weekly - 3 sets - 10 reps - 5 (sec) hold  - Seated Isometric Elbow Extension  - 1 x

## 2025-04-07 ENCOUNTER — RESULTS FOLLOW-UP (OUTPATIENT)
Dept: OBGYN CLINIC | Age: 60
End: 2025-04-07

## 2025-04-07 ENCOUNTER — HOSPITAL ENCOUNTER (OUTPATIENT)
Dept: PHYSICAL THERAPY | Facility: CLINIC | Age: 60
Setting detail: THERAPIES SERIES
Discharge: HOME | End: 2025-04-07
Attending: ORTHOPAEDIC SURGERY
Payer: COMMERCIAL

## 2025-04-07 PROCEDURE — 97112 NEUROMUSCULAR REEDUCATION: CPT

## 2025-04-07 PROCEDURE — 97530 THERAPEUTIC ACTIVITIES: CPT

## 2025-04-07 PROCEDURE — 97161 PT EVAL LOW COMPLEX 20 MIN: CPT

## 2025-04-07 NOTE — CONSULTS
[] [facility]  Outpatient Rehabilitation &  Therapy  [address]  [phone]  F: [phone] [x] [facility]  Outpatient Rehabilitation &  Therapy  [address]   [address]  P: [phone]  F: [phone] [] [facility]  Outpatient Rehabilitation &  Therapy  [address]  P: [phone]  F: [phone] [] [facility]  Outpatient Rehabilitation &  Therapy  [address]  P: [phone]  F: [phone] [] [facility] [place]  Outpatient Rehabilitation &  Therapy  [address]   [address]   P: [phone]  F: [phone]      Physical Therapy Pelvic Floor Evaluation    Date: 25   Patient: Margaret Barbour    : 1965 MRN: [id number]  Physician: Jelena Robbins     Insurance: [employer] [facility] Employee - [address] [facility] of 30vs  Medical Diagnosis: R39.15 (ICD-10-CM) - Urinary urgency   Rehab Codes: R27.8, R29.3, M62.81, N39.46, M99.05  Onset Date: 3/18/25   Next 's appt.: 25    Subjective:   CC/HPI: Pt is a 59yo  female who presents with complaints of stress/urge urinary incontinence. This limits her ability to perform: ADLs/physical activity d/t fear of UI with urgency/coughing/sneezing.    Of significance, pt had L RTC-R on 25. She is currently rehabbing at [facility].      Pt reports she has been experiencing UI and feels this is worsening. Pt reports ALPESH with coughing/sneezing. Pt reports UUI with ability to hold the urge to urinate for minutes. Pt then notes void does not match urgency levels- will have strong urge with decreased void. She does report noting where every bathroom is at wherever she is & is often \"pees\" just in case.   Pt denies wearing daily pads (panty liners), changing underwear prn. Nocturia 2x.      Pt denies dyspareunia & reports tolerance to speculum for pelvic exams.     Pt denies H/O chronic UTIs/yeast/BV/hemorrhoids. Pt reports increased constipation since

## 2025-04-11 ENCOUNTER — HOSPITAL ENCOUNTER (OUTPATIENT)
Dept: PHYSICAL THERAPY | Facility: CLINIC | Age: 60
Setting detail: THERAPIES SERIES
Discharge: HOME | End: 2025-04-11
Attending: ORTHOPAEDIC SURGERY
Payer: COMMERCIAL

## 2025-04-11 PROCEDURE — 97110 THERAPEUTIC EXERCISES: CPT

## 2025-04-11 NOTE — FLOWSHEET NOTE
7 x weekly - 3 sets - 10 reps - 5 (sec) hold  - Seated Isometric Elbow Extension  - 1 x daily - 7 x weekly - 3 sets - 10 reps - 5 (sec) hold        Education      Yes No    Patient Education Provided today  [x]  []     Reviewed established HEP  [x]  []           Comprehension of Education:         Verbalizes Understanding  [x]  []      Demonstrates understanding [x]  []      Needs review [x]  []      Demonstrates/verbalizes HEP/Ed previously given [x]  []            Specific education given  [x]  []  HEP           Medbridge Program for HEP Given [x]  []  Access Code:   [email or web address]     Med[employer] updated as per exercise log today  []  [x]               Plan: [x] Continue current frequency toward long and short term goals.          Time In:1000       Time Out: 1040    Electronically signed by:  Mathew Billingsley PT

## 2025-04-14 ENCOUNTER — HOSPITAL ENCOUNTER (OUTPATIENT)
Dept: PHYSICAL THERAPY | Facility: CLINIC | Age: 60
Setting detail: THERAPIES SERIES
Discharge: HOME | End: 2025-04-14
Attending: ORTHOPAEDIC SURGERY
Payer: COMMERCIAL

## 2025-04-14 ENCOUNTER — HOSPITAL ENCOUNTER (OUTPATIENT)
Age: 60
Discharge: HOME | End: 2025-04-14
Payer: COMMERCIAL

## 2025-04-14 LAB
25(OH)D3 SERPL-MCNC: 34.9 NG/ML (ref 30–100)
ALBUMIN SERPL-MCNC: 4.9 G/DL (ref 3.5–5.2)
ALBUMIN/GLOB SERPL: 2.5 {RATIO} (ref 1–2.5)
ALP SERPL-CCNC: 118 U/L (ref 35–104)
ALT SERPL-CCNC: 18 U/L (ref 10–35)
ANION GAP SERPL CALCULATED.3IONS-SCNC: 12 MMOL/L (ref 9–16)
AST SERPL-CCNC: 25 U/L (ref 10–35)
BILIRUB SERPL-MCNC: 0.5 MG/DL (ref 0–1.2)
BUN SERPL-MCNC: 29 MG/DL (ref 8–23)
CALCIUM SERPL-MCNC: 10.9 MG/DL (ref 8.6–10.4)
CHLORIDE SERPL-SCNC: 105 MMOL/L (ref 98–107)
CO2 SERPL-SCNC: 24 MMOL/L (ref 20–31)
CREAT SERPL-MCNC: 0.9 MG/DL (ref 0.6–0.9)
GFR, ESTIMATED: 73 ML/MIN/1.73M2
GLUCOSE SERPL-MCNC: 72 MG/DL (ref 74–99)
PHOSPHATE SERPL-MCNC: 3 MG/DL (ref 2.5–4.5)
POTASSIUM SERPL-SCNC: 4 MMOL/L (ref 3.7–5.3)
PROT SERPL-MCNC: 6.9 G/DL (ref 6.6–8.7)
PTH-INTACT SERPL-MCNC: 69 PG/ML (ref 17.9–58.6)
SODIUM SERPL-SCNC: 141 MMOL/L (ref 136–145)
T4 FREE SERPL-MCNC: 1.3 NG/DL (ref 0.92–1.68)
TSH SERPL DL<=0.05 MIU/L-ACNC: 4.39 UIU/ML (ref 0.27–4.2)

## 2025-04-14 PROCEDURE — 80053 COMPREHEN METABOLIC PANEL: CPT

## 2025-04-14 PROCEDURE — 84439 ASSAY OF FREE THYROXINE: CPT

## 2025-04-14 PROCEDURE — 84100 ASSAY OF PHOSPHORUS: CPT

## 2025-04-14 PROCEDURE — 83970 ASSAY OF PARATHORMONE: CPT

## 2025-04-14 PROCEDURE — 86334 IMMUNOFIX E-PHORESIS SERUM: CPT

## 2025-04-14 PROCEDURE — 36415 COLL VENOUS BLD VENIPUNCTURE: CPT

## 2025-04-14 PROCEDURE — 84443 ASSAY THYROID STIM HORMONE: CPT

## 2025-04-14 PROCEDURE — 97140 MANUAL THERAPY 1/> REGIONS: CPT

## 2025-04-14 PROCEDURE — 97110 THERAPEUTIC EXERCISES: CPT

## 2025-04-14 PROCEDURE — 82306 VITAMIN D 25 HYDROXY: CPT

## 2025-04-14 NOTE — FLOWSHEET NOTE
daily - 3-5 x weekly - 10 reps  - Bent Knee Fallouts  - 1 x daily - 3-5 x weekly - 10 reps  - Supine Butterfly Groin Stretch  - 1 x daily - 1 sets - 1 reps  - Happy Baby with Pelvic Floor Lengthening  - 1 x daily - 3-5 x weekly - 60 hold  - Supine Bridge with Pelvic Floor Contraction  - 1 x daily - 3-5 x weekly - 2 sets - 10 reps  - Supine Straight Leg Raise with Pelvic Floor Contraction  - 1 x daily - 3-5 x weekly - 10 reps  - Sidelying Clamshell with Pelvic Floor Contraction  - 1 x daily - 3-5 x weekly - 10 reps  - Sidelying Pelvic Floor Contraction with Hip Abduction  - 1 x daily - 3-5 x weekly - 10 reps  Patient Education  - Urinary Incontinence  - Get To Know Your Pelvic Floor- Female  Comprehension of Education:  [x] Verbalizes understanding.  [x] Demonstrates understanding.  [x] Needs review.  [x] Demonstrates/verbalizes HEP/Ed previously given.     Plan: [x] Continue current frequency toward long and short term goals.    [x] Specific Instructions for next treatment: BFB for NMR/PF excursion; ensure knack understood; constipation strategies, progression of pelvic brace & pressure managament in various positions; flexibility (B adductors, L gluteals)       Time In: 11:03am             Time Out: 11:41am    Electronically signed by:  OSEAS SINGLETARY PTA

## 2025-04-15 ENCOUNTER — HOSPITAL ENCOUNTER (OUTPATIENT)
Age: 60
Setting detail: SPECIMEN
Discharge: HOME | End: 2025-04-15
Payer: COMMERCIAL

## 2025-04-15 PROCEDURE — 82340 ASSAY OF CALCIUM IN URINE: CPT

## 2025-04-15 PROCEDURE — 84300 ASSAY OF URINE SODIUM: CPT

## 2025-04-15 PROCEDURE — 81050 URINALYSIS VOLUME MEASURE: CPT

## 2025-04-16 LAB
CALCIUM UR-MCNC: 22 MG/DL
CALCIUM, URINE: 558 MG/24 H (ref 100–300)
COLLECT DURATION TIME SPEC: 24 H
COLLECT DURATION TIME SPEC: 24 H
ITYP INTERPRETATION: NORMAL
PATH REV: NORMAL
SODIUM 24 HOUR URINE: 124 MMOL/24 H (ref 40–220)
SODIUM URINE: 49 MMOL/L
SPECIMEN VOL UR: 2538 ML
SPECIMEN VOL UR: 2538 ML

## 2025-04-17 ENCOUNTER — HOSPITAL ENCOUNTER (OUTPATIENT)
Dept: PHYSICAL THERAPY | Facility: CLINIC | Age: 60
Setting detail: THERAPIES SERIES
Discharge: HOME | End: 2025-04-17
Attending: ORTHOPAEDIC SURGERY
Payer: COMMERCIAL

## 2025-04-17 PROCEDURE — 97140 MANUAL THERAPY 1/> REGIONS: CPT

## 2025-04-17 PROCEDURE — 97110 THERAPEUTIC EXERCISES: CPT

## 2025-04-17 NOTE — FLOWSHEET NOTE
[facility]  Outpatient Rehabilitation &  Therapy  [address]   P: [phone]  F: [phone]      Physical Therapy Daily Treatment Note    Date:  2025  Patient Name:  Margaret Barbour          :  1965  MRN: [id number]  Referring Provider: Dr Winters                         Insurance: [facility] 30v  Medical Diagnosis: M75.102 Tear of left rotator cuff, unspecified tear extent  Rehab Codes: M62.81 (Muscle Weakness), M62.9 (Disorder of Muscle), M79.1 (Myalgia), Z73.6   Onset Date: 25, DOS 25                                             Next 's appt.: 25  Visit# / total visits:  (8 total with [facility] appts)     Cancels/No Shows: 0/0        Subjective:    Pain:  [] Yes  [x] No Location: LUE shoulder  Pain Rating: (0-10 scale) 0/10  Pain altered Tx:  [] No  [] Yes  Action:  Comments: Pt with no pain to report at arrival. Reports she is keeping up with her HEP.       Objective:  Precautions: LUE RTC Repair Large  DOS 25  Passive ER in plane of the scapula: 45 degrees  Passive ER at 60 degrees abduction: 45 degrees  Passive shoulder flexion 90 degrees    Exercise     LUE RTC repair   25 post-op wk 9 Reps/ Time Weight/ Level Comments             Pulleys   5' Flexion/ scaption           Shoulder retraction x10       Shoulder depression x10       Thoracic extension  x10       Towel IR stretch  Week 10              Isometrics:         Elbow flex  5\"x10       Elbow ext  5\"x10       Glenohumeral (GH) flexion   10\"x10       GH Extension  10\"x10       GH ER  10\"x10     GH IR  10\"x10     GH Abduction   10\"x10           Mat:         Wand flexion 2x10  2#    Wand ER 2x10  2#    Table slide flexion  2x10      Table slide Scaption 2x10     Side ER             AAROM         Wall walk flexion  2x10       Wall walk Scaption   2x10           Standing       Shoulder extension   2x10     Shoulder ER   2x10     Shoulder flex to 90  2x10              Dysfunction and

## 2025-04-25 ENCOUNTER — HOSPITAL ENCOUNTER (OUTPATIENT)
Dept: PHYSICAL THERAPY | Facility: CLINIC | Age: 60
Setting detail: THERAPIES SERIES
Discharge: HOME | End: 2025-04-25
Attending: ORTHOPAEDIC SURGERY
Payer: COMMERCIAL

## 2025-04-25 PROCEDURE — 97110 THERAPEUTIC EXERCISES: CPT

## 2025-04-25 NOTE — FLOWSHEET NOTE
[facility]  Outpatient Rehabilitation &  Therapy  [address]   [address]   P: [phone]  F: [phone]      Physical Therapy Daily Treatment Note    Date:  2025  Patient Name:  Margaret Barbour          :  1965  MRN: [id number]  Referring Provider: Dr Winters                         Insurance: [facility] 30v  Medical Diagnosis: M75.102 Tear of left rotator cuff, unspecified tear extent  Rehab Codes: M62.81 (Muscle Weakness), M62.9 (Disorder of Muscle), M79.1 (Myalgia), Z73.6   Onset Date: 25, DOS 25                                             Next 's appt.: 25  Visit# / total visits:  (9 total with [facility] appts)     Cancels/No Shows: 0/0        Subjective:    Pain:  [] Yes  [x] No Location: LUE shoulder  Pain Rating: (0-10 scale) 0/10  Pain altered Tx:  [] No  [] Yes  Action:  Comments: Pt with no pain to report at arrival. Reports she is keeping up with her HEP.       Objective:  Precautions: LUE RTC Repair Large  DOS 25  Passive ER in plane of the scapula: 45 degrees  Passive ER at 60 degrees abduction: 45 degrees  Passive shoulder flexion 90 degrees    Exercise     LUE RTC repair   25 post-op wk 11 Reps/ Time Weight/ Level Comments             Pulleys   5' Flexion/ scaption     UBE   No resistance             Towel IR stretch  x10   HEP         Isometrics:         Elbow flex  5\"x10   HEP   Elbow ext  5\"x10   HEP   Glenohumeral (GH) flexion   10\"x10   HEP   GH Extension  10\"x10   HEP   GH ER  10\"x10  HEP   GH IR  10\"x10  HEP   GH Abduction   10\"x10  HEP         Mat:         Wand flexion 2x10  2# HEP   Wand ER 2x10  2# HEP   Table slide flexion  2x10   HEP   Table slide Scaption 2x10  HEP   Side ER 2x10  1# HEP   Punches  2x10  1#  HEP   ABCs 2x10  1# HEP                AAROM         Wall walk flexion  2x10   HEP   Wall walk Scaption   2x10  HEP         Standing       Shoulder flex to 90  2x10  1# HEP   Shoulder Scaption to 90  2x10  1# HEP

## 2025-04-28 ENCOUNTER — HOSPITAL ENCOUNTER (OUTPATIENT)
Dept: PHYSICAL THERAPY | Facility: CLINIC | Age: 60
Setting detail: THERAPIES SERIES
Discharge: HOME | End: 2025-04-28
Attending: ORTHOPAEDIC SURGERY
Payer: COMMERCIAL

## 2025-04-28 PROCEDURE — 97110 THERAPEUTIC EXERCISES: CPT

## 2025-04-28 NOTE — FLOWSHEET NOTE
[x] [facility]  Outpatient Rehabilitation &  Therapy  [address]  P: [phone]  F: [phone]     Physical Therapy Daily Treatment Note    Date:  2025  Patient Name:  Margaret Barbour    :  1965  MRN: [id number]  Physician: Jelena Robbins                       Insurance: [employer] Employee - [employer] of 30vs  Medical Diagnosis: R39.15 (ICD-10-CM) - Urinary urgency   Rehab Codes: R27.8, R29.3, M62.81, N39.46, M99.05  Onset Date: 3/18/25     Next 's appt.: 25  Visit# / total visits: 3/12    Cancels/No Shows: 0    Subjective:    Pain:  [] Yes  [x] No Location:  N/A  Pain Rating: (0-10 scale) 0/10  Pain altered Tx:  [x] No  [] Yes  Action:    Comments: Pt continues to feel she making progress. She has been having more normal bladder habits with reduced frequency and longer voids when she goes d/t practicing urge suppression strategies. Pt states that if she does hold too long she continues to have UI.      Objective:  Modalities:   Precautions:   Exercises: [employer] Access Code: [email or web address]  KT= Kinesiotape  PB= pelvic brace (DB-exhale+ TA contraction + kegel)  DB= diaphragmatic breathing  Exercise Reps/ Time Weight/ Level Comments   Pelvic model explanation & education      PF function  3 layers  Analogies - IAP (core cannister, juice box, trampoline)  Diaphragm & pelvic floor relationship (visual aid)     body scanning every 2 hrs for tension   exhale with effort  use pelvic brace (exhale, core contraction & pelvic floor muscle lift) in high pressure moments               Supine            Diaphragmatic breathing  HEP       Transversus Abdominis Bracing with Pelvic Floor Contraction  10x5\"   Added ball squeeze   Increased hold time    Bent Knee Fallouts  10x ea Blue      Hooklying Hamstring Stretch with Strap  1' ea       Happy Baby with Pelvic Floor Lengthening HEP    pt modifies on the wall at home   Bridges with PB 10x  10x Ball  Blue      Hip abd

## 2025-04-30 ENCOUNTER — OFFICE VISIT (OUTPATIENT)
Dept: ORTHOPEDIC SURGERY | Age: 60
End: 2025-04-30

## 2025-04-30 ENCOUNTER — TELEPHONE (OUTPATIENT)
Dept: ORTHOPEDIC SURGERY | Age: 60
End: 2025-04-30

## 2025-04-30 VITALS — RESPIRATION RATE: 14 BRPM | WEIGHT: 140 LBS | BODY MASS INDEX: 23.9 KG/M2 | HEIGHT: 64 IN

## 2025-04-30 DIAGNOSIS — Z98.890 STATUS POST ROTATOR CUFF REPAIR: Primary | ICD-10-CM

## 2025-04-30 PROCEDURE — 99024 POSTOP FOLLOW-UP VISIT: CPT | Performed by: ORTHOPAEDIC SURGERY

## 2025-04-30 NOTE — TELEPHONE ENCOUNTER
Patient called office to see if paperwork was received. Informed patient that no paperwork was received at this time. Informed patient that once paperwork was received that office would inform her.

## 2025-04-30 NOTE — TELEPHONE ENCOUNTER
Patient was in office today for follow up. She stated she has to be able to return to work without restrictions per her job and currently has STD with a return to work date already set. Based on the tasks required for her job she will need an extension for that time. Dr Winters stated she will need an additional 2 months from that date. There is paperwork being faxed to one of the offices and will need to be completed.

## 2025-04-30 NOTE — PROGRESS NOTES
Procedure: Left shoulder arthroscopic rotator cuff repair with Bio brace graft augmentation, and biceps tenodesis  Date of procedure: 2/7/25    HPI:  Margaret Barbour is a 60 y.o. female who is approximately 3 months status post aforementioned procedure.  She indicates that she is doing well really having no pain in her shoulder.  Therapy has been going fairly well for her.  She has attended 5 sessions thus far.    Physical examination:  Evaluation of patient's left shoulder and upper extremity demonstrates her to have approximately 100 degrees of active shoulder elevation 90 degrees of abduction and 75 degrees of external rotation.  Passively she has 120 degrees of shoulder elevation and abduction with 55 degrees of external rotation.  5/5 muscle strength with resisted external and internal rotation with 4+/5 muscle strength with supraspinatus testing.    Impression and plan:  Margaret Barbour is a 60 y.o. female who is approximately 3 months status post an arthroscopic left shoulder rotator cuff repair with Bio brace graft augmentation, and biceps tenodesis.  She is making decent progress with her rehabilitation and is overall doing fairly well.  She was encouraged to keep up with physical therapy and her home exercise program working on her motion as well as light progressive strengthening.  She may continue to use this arm for light activities of daily living limiting any lifting pushing or pulling to 2 to 3 pounds.  We had a discussion about returning to work.  Unfortunately she cannot return with any restrictions and will need to be able to lift at least 50 pounds when she returns.  I explained that she would likely will be able to do so for the next couple of months.  Her short-term disability ends essentially 3 months from surgery and so this will need to be extended.  She has already reached out to the company and states that they are going to fax my office paperwork for the extension.  Once we receive it

## 2025-05-01 ENCOUNTER — PATIENT MESSAGE (OUTPATIENT)
Dept: ORTHOPEDIC SURGERY | Age: 60
End: 2025-05-01

## 2025-05-01 NOTE — TELEPHONE ENCOUNTER
Paperwork received. Paperwork nearly completed, awaiting approval and signature from provider.     Release of Information on file.       Fundation message sent to patient informing patient of paperwork receipt.

## 2025-05-02 ENCOUNTER — HOSPITAL ENCOUNTER (OUTPATIENT)
Dept: PHYSICAL THERAPY | Facility: CLINIC | Age: 60
Setting detail: THERAPIES SERIES
Discharge: HOME | End: 2025-05-02
Attending: ORTHOPAEDIC SURGERY
Payer: COMMERCIAL

## 2025-05-02 PROCEDURE — 97110 THERAPEUTIC EXERCISES: CPT

## 2025-05-02 NOTE — FLOWSHEET NOTE
[facility]  Outpatient Rehabilitation &  Therapy  [address]   P: [phone]  F: [phone]      Physical Therapy Daily Treatment Note    Date:  2025  Patient Name:  Margaret Barbour          :  1965  MRN: [id number]  Referring Provider: Dr Winters                         Insurance: [facility] 30v  Medical Diagnosis: M75.102 Tear of left rotator cuff, unspecified tear extent  Rehab Codes: M62.81 (Muscle Weakness), M62.9 (Disorder of Muscle), M79.1 (Myalgia), Z73.6   Onset Date: 25, DOS 25                                             Next 's appt.:   Visit# / total visits:  (10 [place] [facility] [place]     Cancels/No Shows: 0/0        Subjective:    Pain:  [] Yes  [x] No Location: LUE shoulder  Pain Rating: (0-10 scale) 0/10  Pain altered Tx:  [x] No  [] Yes  Action:  Comments: Pt with no pain to report at arrival. Reports she is keeping up with her HEP. Saw Dr Winters, will be of work for another 2 months as she cannot return with restrictions.       Objective:  Precautions: LUE RTC Repair Large  DOS 25  Passive ER in plane of the scapula: 45 degrees  Passive ER at 60 degrees abduction: 45 degrees  Passive shoulder flexion 90 degrees    Exercise     LUE RTC repair   25 post-op wk 11 Reps/ Time Weight/ Level Comments             UBE  2' forward, 2' backward No resistance             Towel IR stretch  x10   HEP               Mat:         Wand flexion 2x10  2# HEP   Wand ER 2x10  2# HEP   Side ER 2x10  1# HEP   Punches  2x10  1#  HEP   ABCs 2x10  1# HEP         Prone Bench      Rows 2x10 1#    Extension 2x10 1#    Scaption 2x10 1#    Flexion 2x10 1#    HAB (External rotation) 2x10 1#          AAROM         Wall walk flexion  2x10   HEP   Wall walk Scaption   2x10  HEP         Standing       Shoulder flex to 90  2x10  2# HEP   Shoulder Scaption to 90  2x10  2# HEP         Resistance bands      Retraction  x20 Morrill  HEP   Extension  x20 Morrill HEP

## 2025-05-08 ENCOUNTER — HOSPITAL ENCOUNTER (OUTPATIENT)
Dept: PHYSICAL THERAPY | Facility: CLINIC | Age: 60
Setting detail: THERAPIES SERIES
Discharge: HOME | End: 2025-05-08
Attending: ORTHOPAEDIC SURGERY
Payer: COMMERCIAL

## 2025-05-08 PROCEDURE — 97110 THERAPEUTIC EXERCISES: CPT

## 2025-05-08 NOTE — FLOWSHEET NOTE
[x] [facility]  Outpatient Rehabilitation &  Therapy  [address]  P: [phone]  F: [phone]     Physical Therapy Daily Treatment Note    Date:  2025  Patient Name:  Margaret Barbour    :  1965  MRN: [id number]  Physician: Jelena Robbins                       Insurance: [facility] Employee - 26 remain of 30vs  Medical Diagnosis: R39.15 (ICD-10-CM) - Urinary urgency   Rehab Codes: R27.8, R29.3, M62.81, N39.46, M99.05  Onset Date: 3/18/25     Next 's appt.: 25  Visit# / total visits:     Cancels/No Shows: 0    Subjective:    Pain:  [] Yes  [x] No Location:  N/A  Pain Rating: (0-10 scale) 0/10  Pain altered Tx:  [x] No  [] Yes  Action:    Comments:  Pt feels that she continues to make good progress. Has been consistent with her HEP. Reports longer stream with voiding. Pt also notes more urgency after drinking wine this week.     Objective:  Modalities:   Precautions:   Exercises: [employer] Access Code: [email or web address]  KT= Kinesiotape  PB= pelvic brace (DB-exhale+ TA contraction + kegel)  DB= diaphragmatic breathing  Exercise Reps/ Time Weight/ Level Comments   Pelvic model explanation & education      PF function  3 layers  Analogies - IAP (core cannister, juice box, trampoline)  Diaphragm & pelvic floor relationship (visual aid)     body scanning every 2 hrs for tension   exhale with effort  use pelvic brace (exhale, core contraction & pelvic floor muscle lift) in high pressure moments               Supine            Diaphragmatic breathing  HEP       Transversus Abdominis Bracing with Pelvic Floor Contraction  10x6\"   Added ball squeeze 4/  Increased hold time 5/8   Quick flicks 10x  Added 5/8   Bent Knee Fallouts  10x ea plum Progressed resistance 5/8   Hooklying Hamstring Stretch with Strap  1' ea       Happy Baby with Pelvic Floor Lengthening HEP    pt modifies on the wall at home   Bridges with PB 10x  10x Ball  Plum  Progressed resistance 5/8   Hip abd

## 2025-05-09 ENCOUNTER — HOSPITAL ENCOUNTER (OUTPATIENT)
Dept: PHYSICAL THERAPY | Facility: CLINIC | Age: 60
Setting detail: THERAPIES SERIES
Discharge: HOME | End: 2025-05-09
Attending: ORTHOPAEDIC SURGERY
Payer: COMMERCIAL

## 2025-05-09 PROCEDURE — 97110 THERAPEUTIC EXERCISES: CPT

## 2025-05-09 NOTE — FLOWSHEET NOTE
[facility]  Outpatient Rehabilitation &  Therapy  [address]   P: [phone]  F: [phone]      Physical Therapy Daily Treatment Note    Date:  2025  Patient Name:  Margaret Barbour          :  1965  MRN: [id number]  Referring Provider: Dr Winters                         Insurance: [facility] 30v  Medical Diagnosis: M75.102 Tear of left rotator cuff, unspecified tear extent  Rehab Codes: M62.81 (Muscle Weakness), M62.9 (Disorder of Muscle), M79.1 (Myalgia), Z73.6   Onset Date: 25, DOS 25                                             Next 's appt.: 25    Visit# / total visits:  (12 total with [facility] appts)     Cancels/No Shows: 0/0        Subjective:    Pain:  [] Yes  [x] No Location: LUE shoulder  Pain Rating: (0-10 scale) 0/10  Pain altered Tx:  [x] No  [] Yes  Action:  Comments: Patient arrived noting no new issues and no pain.      Objective:  Precautions: LUE RTC Repair Large  DOS 25 - 13 weeks 2025  Passive ER in plane of the scapula: 45 degrees  Passive ER at 60 degrees abduction: 45 degrees  Passive shoulder flexion 90 degrees    Exercise     LUE RTC repair   25 post-op wk 11 Reps/ Time Weight/ Level Comments             UBE  2' forward, 2' backward No resistance             Towel IR stretch  x10   HEP               Mat:         Wand flexion 2x10  2# HEP   Wand ER 2x10  2# HEP   Side ER 2x10  1# HEP   Punches  2x10  1#  HEP   ABCs 2x10  1# HEP         Prone Bench      Rows 2x10 1#    Extension 2x10 1#    Scaption 2x10 1#    Flexion 2x10 1#    HAB (External rotation) 2x10 1#          AAROM         Wall walk flexion  2x10   HEP   Wall walk Scaption   2x10  HEP         Standing       Shoulder flex to 90  2x10  2# HEP   Shoulder Scaption to 90  2x10  2# HEP         Resistance bands      Retraction  x20 Green HEP   Extension  x20 Green HEP   Triceps   x20 Green HEP   Biceps  x20 Green HEP         Isodynamic       IR x20 Orange    ER x20

## 2025-05-15 ENCOUNTER — HOSPITAL ENCOUNTER (OUTPATIENT)
Dept: PHYSICAL THERAPY | Facility: CLINIC | Age: 60
Setting detail: THERAPIES SERIES
Discharge: HOME | End: 2025-05-15
Attending: ORTHOPAEDIC SURGERY
Payer: COMMERCIAL

## 2025-05-15 PROCEDURE — 97110 THERAPEUTIC EXERCISES: CPT

## 2025-05-15 NOTE — FLOWSHEET NOTE
Contraction  - 1 x daily - 3-5 x weekly - 1-2 sets - 10 reps  - Shoulder extension with resistance - Neutral  - 1 x daily - 3-5 x weekly - 1-2 sets - 10 reps  - Standing Tricep Extensions with Resistance  - 1 x daily - 3-5 x weekly - 1-2 sets - 10 reps  - Standing Bicep Curl with Pelvic Floor Contraction  - 1 x daily - 3-5 x weekly - 1-2 sets - 10 reps  - Mini Squat with Pelvic Floor Contraction  - 1 x daily - 3-5 x weekly - 10 reps  - Standing Heel Raise  - 1 x daily - 3-5 x weekly - 10 reps  - Single Leg Balance with Pelvic Floor Contraction  - 1 x daily - 3-5 x weekly - 5 reps - 5 hold  Patient Education  - Urinary Incontinence  - Get To Know Your Pelvic Floor- Female  Comprehension of Education:  [x] Verbalizes understanding.  [x] Demonstrates understanding.  [x] Needs review.  [x] Demonstrates/verbalizes HEP/Ed previously given.     Plan: [x] Continue current frequency toward long and short term goals.    [x] Specific Instructions for next treatment: BFB for NMR/PF excursion; ensure knack understood; constipation strategies, progression of pelvic brace & pressure managament in various positions; flexibility (B adductors, L gluteals)       Time In: 2:55pm             Time Out: 3:30pm    Electronically signed by:  OSEAS SINGLETARY PTA

## 2025-05-16 ENCOUNTER — HOSPITAL ENCOUNTER (OUTPATIENT)
Dept: PHYSICAL THERAPY | Facility: CLINIC | Age: 60
Setting detail: THERAPIES SERIES
Discharge: HOME | End: 2025-05-16
Attending: ORTHOPAEDIC SURGERY
Payer: COMMERCIAL

## 2025-05-16 PROCEDURE — 97110 THERAPEUTIC EXERCISES: CPT

## 2025-05-16 NOTE — FLOWSHEET NOTE
[facility]  Outpatient Rehabilitation &  Therapy  [address]   P: [phone]  F: [phone]      Physical Therapy Daily Treatment Note    Date:  2025  Patient Name:  Margaret Barbour          :  1965  MRN: [id number]  Referring Provider: Dr Winters                         Insurance: [facility] 30v  Medical Diagnosis: M75.102 Tear of left rotator cuff, unspecified tear extent  Rehab Codes: M62.81 (Muscle Weakness), M62.9 (Disorder of Muscle), M79.1 (Myalgia), Z73.6   Onset Date: 25, DOS 25                                             Next 's appt.: 25    Visit# / total visits: 10/20 (14 total with [facility] appts)     Cancels/No Shows: 0/0        Subjective:    Pain:  [] Yes  [x] No Location: LUE shoulder  Pain Rating: (0-10 scale) 0/10  Pain altered Tx:  [x] No  [] Yes  Action:  Comments: Patient arrived noting no pain and no issues post last treatment.      Objective:  Precautions: LUE RTC Repair Large  DOS 25 - 14 weeks 2025  Passive ER in plane of the scapula: 45 degrees  Passive ER at 60 degrees abduction: 45 degrees  Passive shoulder flexion 90 degrees    Exercise     LUE RTC repair   25 post-op wk 14 Reps/ Time Weight/ Level Comments             UBE  2' forward, 2' backward No resistance             Towel IR stretch  x10   HEP               Mat:         Wand flexion 2x10  2# HEP   Wand ER 2x10  2# HEP   Side ER 2x10  2# HEP   Punches 3-way 2x10  2#  HEP   ABCs 2x10  2# HEP         Prone Bench      Rows 2x10 2#    Extension 2x10 2#    Scaption 2x10 2#    Flexion 2x10 2#    HAB (External rotation) 2x10 2#          AAROM         Wall walk flexion  2x10   HEP   Wall walk Scaption   2x10  HEP         Standing       Shoulder flex to 90  2x10  2# HEP   Shoulder Scaption to 90  2x10  2# HEP         Resistance bands      Retraction  x20 Green HEP   Extension  x20 Green HEP   Triceps   x20 Green HEP   Biceps  x20 Green HEP         Isodynamic       IR

## 2025-05-23 ENCOUNTER — HOSPITAL ENCOUNTER (OUTPATIENT)
Dept: PHYSICAL THERAPY | Facility: CLINIC | Age: 60
Setting detail: THERAPIES SERIES
Discharge: HOME | End: 2025-05-23
Attending: ORTHOPAEDIC SURGERY
Payer: COMMERCIAL

## 2025-05-23 PROCEDURE — 97110 THERAPEUTIC EXERCISES: CPT

## 2025-05-23 NOTE — FLOWSHEET NOTE
[facility]  Outpatient Rehabilitation &  Therapy  [address]   P: [phone]  F: [phone]      Physical Therapy Daily Treatment Note    Date:  2025  Patient Name:  Margaret Barbour          :  1965  MRN: [id number]  Referring Provider: Dr Winters                         Insurance: [facility] 30v  Medical Diagnosis: M75.102 Tear of left rotator cuff, unspecified tear extent  Rehab Codes: M62.81 (Muscle Weakness), M62.9 (Disorder of Muscle), M79.1 (Myalgia), Z73.6   Onset Date: 25, DOS 25                                             Next 's appt.: 25    Visit# / total visits: 10/20 (14 total with [facility] appts)     Cancels/No Shows: 0/0        Subjective:    Pain:  [] Yes  [x] No Location: LUE shoulder  Pain Rating: (0-10 scale) 0/10  Pain altered Tx:  [x] No  [] Yes  Action:  Comments: Patient arrived noting no pain and no issues post last treatment.      Objective:  Precautions: LUE RTC Repair Large  DOS 25 - 14 weeks 2025  Passive ER in plane of the scapula: 45 degrees  Passive ER at 60 degrees abduction: 45 degrees  Passive shoulder flexion 90 degrees    Exercise     LUE RTC repair   25 post-op wk 14 Reps/ Time Weight/ Level Comments             UBE  2' forward, 2' backward No resistance             Towel IR stretch  x10   HEP               Mat:         Wand flexion 2x10  2# HEP   Wand ER 2x10  2# HEP   Side ER 2x10  2# HEP   Punches 3-way 2x10  2#  HEP   ABCs 2x10  2# HEP         Prone Bench      Rows 2x10 2#    Extension 2x10 2#    Scaption 2x10 2#    Flexion 2x10 2#    HAB (External rotation) 2x10 2#          AAROM         Wall walk flexion  2x10   HEP   Wall walk Scaption   2x10  HEP         Standing       Shoulder flex to 90  2x10  3# HEP   Shoulder Scaption to 90  2x10  3# HEP         Resistance bands      Retraction  x20 Blue HEP   Extension  x20 Blue HEP   Triceps   x20 Blue HEP   Biceps  x20 Blue HEP         Isodynamic       IR x20

## 2025-05-29 ENCOUNTER — HOSPITAL ENCOUNTER (OUTPATIENT)
Dept: PHYSICAL THERAPY | Facility: CLINIC | Age: 60
Setting detail: THERAPIES SERIES
Discharge: HOME | End: 2025-05-29
Attending: ORTHOPAEDIC SURGERY
Payer: COMMERCIAL

## 2025-05-29 PROCEDURE — 97110 THERAPEUTIC EXERCISES: CPT

## 2025-05-29 NOTE — FLOWSHEET NOTE
[x] [facility]  Outpatient Rehabilitation &  Therapy  [address]  P: [phone]  F: [phone]     Physical Therapy Daily Treatment Note    Date:  2025  Patient Name:  Margaret Barbour    :  1965  MRN: [id number]  Physician: Jelena Robbins                       Insurance: [facility] [employer] - 26 remain of 30vs  Medical Diagnosis: R39.15 (ICD-10-CM) - Urinary urgency   Rehab Codes: R27.8, R29.3, M62.81, N39.46, M99.05  Onset Date: 3/18/25     Next 's appt.: 25  Visit# / total visits:     Cancels/No Shows: 0    Subjective:    Pain:  [] Yes  [x] No Location:  N/A  Pain Rating: (0-10 scale) 0/10  Pain altered Tx:  [x] No  [] Yes  Action:    Comments:  Pt denies complaints. Reports improved ALPESH with less frequent leakage and no longer has to cross her legs to sneeze.     Objective:  Modalities:   Precautions:   Exercises: [employer] Access Code: [email or web address]  KT= Kinesiotape  PB= pelvic brace (DB-exhale+ TA contraction + kegel)  DB= diaphragmatic breathing  Exercise Reps/ Time Weight/ Level Comments   Pelvic model explanation & education      PF function  3 layers  Analogies - IAP (core cannister, juice box, trampoline)  Diaphragm & pelvic floor relationship (visual aid)     body scanning every 2 hrs for tension   exhale with effort  use pelvic brace (exhale, core contraction & pelvic floor muscle lift) in high pressure moments               Supine            Diaphragmatic breathing  HEP       Transversus Abdominis Bracing with Pelvic Floor Contraction  10x8\"   Added ball squeeze   Increased hold time    DKTC with stability ball and PB 10x Small red ball Added /15   Hooklying Hamstring Stretch with Strap  1' ea       Happy Baby with Pelvic Floor Lengthening HEP    pt modifies on the wall at home   Bridges with PB 10x  10x Ball  Plum  Progressed resistance    HS bridges on swiss ball with PB 10x  New 5/15   Hip abd with PB 10x Plum  Progressed resistance

## 2025-05-30 ENCOUNTER — HOSPITAL ENCOUNTER (OUTPATIENT)
Dept: PHYSICAL THERAPY | Facility: CLINIC | Age: 60
Setting detail: THERAPIES SERIES
Discharge: HOME | End: 2025-05-30
Attending: ORTHOPAEDIC SURGERY
Payer: COMMERCIAL

## 2025-05-30 PROCEDURE — 97110 THERAPEUTIC EXERCISES: CPT

## 2025-05-30 NOTE — FLOWSHEET NOTE
[facility]  Outpatient Rehabilitation &  Therapy  [address]   P: [phone]  F: [phone]      Physical Therapy Daily Treatment Note    Date:  2025  Patient Name:  Margaret Barbour          :  1965  MRN: [id number]  Referring Provider: Dr Winters                         Insurance: [facility] 30v  Medical Diagnosis: M75.102 Tear of left rotator cuff, unspecified tear extent  Rehab Codes: M62.81 (Muscle Weakness), M62.9 (Disorder of Muscle), M79.1 (Myalgia), Z73.6   Onset Date: 25, DOS 25                                             Next 's appt.: 25    Visit# / total visits:  (18 total with [facility] [place])     Cancels/No Shows: 0/0        Subjective:    Pain:  [] Yes  [x] No Location: LUE shoulder  Pain Rating: (0-10 scale) 0/10  Pain altered Tx:  [x] No  [] Yes  Action:  Comments: Patient arrived noting no pain and no issues post last treatment.      Objective:  Precautions: LUE RTC Repair Large  DOS 25 - 16 weeks 2025    Exercise     LUE RTC repair   25 post-op wk 14 Reps/ Time Weight/ Level Comments             UBE  2' forward, 2' backward No resistance             Towel IR stretch  x10   HEP               Mat:         Wand flexion 2x10  3# HEP   Wand ER 2x10  3# HEP   Side ER 2x10  3# HEP   Punches 3-way 2x10  3#  HEP   ABCs 2x10  3# HEP         Prone Bench      Rows 2x10  3#    Extension 2x10  3#    Scaption 2x10  3#    Flexion 2x10  3#    HAB (External rotation) 2x10  3#          Standing       Shoulder flex to 90  2x10  3# HEP   Shoulder Scaption to 90  2x10  3# HEP   Shoulder Abduction to 90  2x10  AROM          Resistance bands      Retraction  x20  Blue HEP   Extension  x20  Blue HEP   Triceps   x20  Blue HEP   Biceps  x20  Blue HEP   IR  x20  Ray    ER  x20  Ray                       Treatment Charges: Mins Units   Ther Exercise 30 2   Manual Therapy     Vasocompression          Total Billable time 30 2

## 2025-06-06 ENCOUNTER — HOSPITAL ENCOUNTER (OUTPATIENT)
Dept: PHYSICAL THERAPY | Facility: CLINIC | Age: 60
Setting detail: THERAPIES SERIES
Discharge: HOME | End: 2025-06-06
Attending: ORTHOPAEDIC SURGERY
Payer: COMMERCIAL

## 2025-06-06 PROCEDURE — 97110 THERAPEUTIC EXERCISES: CPT

## 2025-06-06 NOTE — FLOWSHEET NOTE
[facility]  Outpatient Rehabilitation &  Therapy  [address]   P: [phone]  F: [phone]      Physical Therapy Daily Treatment Note    Date:  2025  Patient Name:  Margaret Barbour          :  1965  MRN: [id number]  Referring Provider: Dr Winters                         Insurance: [facility] 30v  Medical Diagnosis: M75.102 Tear of left rotator cuff, unspecified tear extent  Rehab Codes: M62.81 (Muscle Weakness), M62.9 (Disorder of Muscle), M79.1 (Myalgia), Z73.6   Onset Date: 25, DOS 25                                             Next 's appt.: 25    Visit# / total visits:  (18 total with [facility] [place]ts)     Cancels/No Shows: 0/0        Subjective:    Pain:  [] Yes  [x] No Location: LUE shoulder  Pain Rating: (0-10 scale) 0/10  Pain altered Tx:  [x] No  [] Yes  Action:  Comments: Patient arrived noting no pain and no difficulties with her HEP.       Objective:  Precautions: LUE RTC Repair Large  DOS 25 - 16 weeks 2025    Exercise     LUE RTC repair   25 post-op wk 14 Reps/ Time Weight/ Level Comments             UBE  2' forward, 2' backward No resistance             Towel IR stretch  x10   HEP               Mat:         Wand flexion 2x10  3# HEP   Wand ER 2x10  3# HEP   Side ER 2x10  3# HEP   Punches 3-way 2x10  3#  HEP   ABCs 2x10  3# HEP         Prone Bench      Rows 2x10  3#    Extension 2x10  3#    Scaption 2x10  3#    Flexion 2x10  3#    HAB (External rotation) 2x10  3#          Standing       Shoulder flex to 90  2x10  3# HEP   Shoulder Scaption to 90  2x10  3# HEP   Shoulder Abduction to 90  2x10  1#          Resistance bands      Retraction  x20  Blue HEP   Extension  x20  Blue HEP   Triceps   x20  Blue HEP   Biceps  x20  Blue HEP   IR  x20  Green     ER  x20  Green                        Treatment Charges: Mins Units   Ther Exercise 30 2   Manual Therapy     Vasocompression          Total Billable time 30 2       Assessment: [x]

## 2025-06-12 ENCOUNTER — HOSPITAL ENCOUNTER (OUTPATIENT)
Dept: PHYSICAL THERAPY | Facility: CLINIC | Age: 60
Setting detail: THERAPIES SERIES
Discharge: HOME | End: 2025-06-12
Attending: ORTHOPAEDIC SURGERY
Payer: COMMERCIAL

## 2025-06-12 PROCEDURE — 97110 THERAPEUTIC EXERCISES: CPT

## 2025-06-12 NOTE — FLOWSHEET NOTE
resistance - Neutral  - 1 x daily - 3-5 x weekly - 1-2 sets - 10 reps  - Standing Tricep Extensions with Resistance  - 1 x daily - 3-5 x weekly - 1-2 sets - 10 reps  - Standing Bicep Curl with Pelvic Floor Contraction  - 1 x daily - 3-5 x weekly - 1-2 sets - 10 reps  - Mini Squat with Pelvic Floor Contraction  - 1 x daily - 3-5 x weekly - 10 reps  - Standing Heel Raise  - 1 x daily - 3-5 x weekly - 10 reps  - Single Leg Balance with Pelvic Floor Contraction  - 1 x daily - 3-5 x weekly - 5 reps - 7 hold  - Sidestepping with Pelvic Floor Contraction  - 3 x weekly - 15 reps  - Forward Monster Walks  - 1 x daily - 3-5 x weekly - 15 reps  Patient Education  - Urinary Incontinence  - Get To Know Your Pelvic Floor- Female  Comprehension of Education:  [x] Verbalizes understanding.  [x] Demonstrates understanding.  [x] Needs review.  [x] Demonstrates/verbalizes HEP/Ed previously given.     Plan: [x] Continue current frequency toward long and short term goals.    [x] Specific Instructions for next treatment: BFB for NMR/PF excursion; constipation strategies, progression of pelvic brace & pressure managament in various positions      Time In: 11:00am             Time Out: 11:40am    Electronically signed by:  OSEAS SINGLETARY PTA

## 2025-06-13 ENCOUNTER — HOSPITAL ENCOUNTER (OUTPATIENT)
Dept: PHYSICAL THERAPY | Facility: CLINIC | Age: 60
Setting detail: THERAPIES SERIES
Discharge: HOME | End: 2025-06-13
Attending: ORTHOPAEDIC SURGERY
Payer: COMMERCIAL

## 2025-06-13 PROCEDURE — 97110 THERAPEUTIC EXERCISES: CPT

## 2025-06-13 NOTE — FLOWSHEET NOTE
[facility]  Outpatient Rehabilitation &  Therapy  [address]   P: [phone]  F: [phone]      Physical Therapy Daily Treatment Note    Date:  2025  Patient Name:  Margaret Barbour          :  1965  MRN: [id number]  Referring Provider: Dr Winters                         Insurance: [facility] 30v  Medical Diagnosis: M75.102 Tear of left rotator cuff, unspecified tear extent  Rehab Codes: M62.81 (Muscle Weakness), M62.9 (Disorder of Muscle), M79.1 (Myalgia), Z73.6   Onset Date: 25, DOS 25                                             Next 's appt.: 25    Visit# / total visits:  (19 total with [facility] appts)     Cancels/No Shows: 0/0        Subjective:    Pain:  [] Yes  [x] No Location: LUE shoulder  Pain Rating: (0-10 scale) 0/10  Pain altered Tx:  [x] No  [] Yes  Action:  Comments: Patient arrived noting no pain and no difficulties with her HEP.       Objective:  Precautions: LUE RTC Repair Large  DOS 25 - 16 weeks 2025    Exercise     LUE RTC repair   25 post-op wk 14 Reps/ Time Weight/ Level Comments             UBE  2' forward, 2' backward No resistance             Towel IR stretch  x10   HEP               Mat:         Wand flexion 2x10  3# HEP   Wand ER 2x10  3# HEP   Side ER 2x10  3# HEP   Punches 3-way 2x10  3#  HEP   ABCs 2x10  3# HEP         Prone Bench      Rows 2x10  3#    Extension 2x10  3#    Scaption 2x10  3#    Flexion 2x10  3#      HAB (External rotation) 2x10  3#          Standing       Shoulder flex to 90  2x10  3# HEP   Shoulder Scaption to 90  2x10  3# HEP   Shoulder Abduction to 90  2x10  1#          Resistance bands      Retraction  x20  Blue HEP   Extension  x20  Blue HEP   Triceps   x20  Blue HEP   Biceps  x20  Blue HEP   IR  x20  Blue     ER  x20  Blue                        Treatment Charges: Mins Units   Ther Exercise 30 2   Manual Therapy     Vasocompression          Total Billable time 30 2       Assessment: [x]

## 2025-06-20 ENCOUNTER — HOSPITAL ENCOUNTER (OUTPATIENT)
Dept: PHYSICAL THERAPY | Facility: CLINIC | Age: 60
Setting detail: THERAPIES SERIES
Discharge: HOME | End: 2025-06-20
Attending: ORTHOPAEDIC SURGERY
Payer: COMMERCIAL

## 2025-06-20 PROCEDURE — 97110 THERAPEUTIC EXERCISES: CPT

## 2025-06-20 NOTE — FLOWSHEET NOTE
[facility]  Outpatient Rehabilitation &  Therapy  [address]   P: [phone]  F: [phone]      Physical Therapy Daily Treatment Note    Date:  2025  Patient Name:  Margaret Barbour          :  1965  MRN: [id number]  Referring Provider: Dr Winters                         Insurance: [facility] 30v  Medical Diagnosis: M75.102 Tear of left rotator cuff, unspecified tear extent  Rehab Codes: M62.81 (Muscle Weakness), M62.9 (Disorder of Muscle), M79.1 (Myalgia), Z73.6   Onset Date: 25, DOS 25                                             Next 's appt.: 25    Visit# / total visits: 15/20 (22 total with [facility] appts)     Cancels/No Shows: 0/0        Subjective:    Pain:  [] Yes  [x] No Location: LUE shoulder  Pain Rating: (0-10 scale) 0/10  Pain altered Tx:  [x] No  [] Yes  Action:  Comments: Patient arrived noting new issues to report.      Objective:  Precautions: LUE RTC Repair Large  DOS 25 - 16 weeks 2025    Exercise     LUE RTC repair   25 post-op wk 14 Reps/ Time Weight/ Level Comments             UBE  2' forward, 2' backward No resistance             Towel IR stretch  x10   HEP               Mat:         Wand flexion 2x10  4# HEP   Wand ER 2x10  4# HEP   Side ER 2x10  4# HEP   Punches 3-way 2x10  4#  HEP   ABCs 2x10  4# HEP         Prone Bench      Rows 2x10  5#    Extension 2x10  5#    Scaption 2x10  4#    Flexion 2x10  4#      HAB (External rotation) 2x10  4#          Standing       Shoulder flex to 90  2x10  4# HEP   Shoulder Scaption to 90  2x10  4# HEP   Shoulder Abduction to 90  2x10  3#          Resistance bands      Retraction  x20 Purple HEP   Extension  x20 Purple HEP   Triceps   x20 Purple HEP   Biceps  x20 Purple HEP   IR  x20 Purple    ER  x20 Purple                Ball on wall  x20 Red  All Directions   Ball wall taps  x5 Red  Half Arc   Wall push up  x20                              Treatment Charges: Mins Units   Ther Exercise

## 2025-06-27 ENCOUNTER — HOSPITAL ENCOUNTER (OUTPATIENT)
Dept: PHYSICAL THERAPY | Facility: CLINIC | Age: 60
Setting detail: THERAPIES SERIES
Discharge: HOME | End: 2025-06-27
Attending: ORTHOPAEDIC SURGERY
Payer: COMMERCIAL

## 2025-06-27 PROCEDURE — 97110 THERAPEUTIC EXERCISES: CPT

## 2025-06-27 NOTE — FLOWSHEET NOTE
[facility]  Outpatient Rehabilitation &  Therapy  [address]   P: [phone]  F: [phone]      Physical Therapy Daily Treatment Note    Date:  2025  Patient Name:  Margaret Barbour          :  1965  MRN: [id number]  Referring Provider: Dr Winters                         Insurance: [facility] 30v  Medical Diagnosis: M75.102 Tear of left rotator cuff, unspecified tear extent  Rehab Codes: M62.81 (Muscle Weakness), M62.9 (Disorder of Muscle), M79.1 (Myalgia), Z73.6   Onset Date: 25, DOS 25                                             Next 's appt.: 25    Visit# / total visits:  (22 total with [facility] appts)     Cancels/No Shows: 0/0        Subjective:    Pain:  [] Yes  [x] No Location: LUE shoulder  Pain Rating: (0-10 scale) 0/10  Pain altered Tx:  [x] No  [] Yes  Action:  Comments: Patient arrived with no pain. Reports adherence to HEP       Objective:  Precautions: LUE RTC Repair Large  DOS 25 - 16 weeks 2025    Exercise     LUE RTC repair   25 post-op wk 14 Reps/ Time Weight/ Level Comments             UBE  2' forward, 2' backward No resistance             Towel IR stretch  x10   HEP               Mat:         Wand flexion 2x10  4# HEP   Wand ER 2x10  4# HEP   Side ER 2x10  4# HEP   Punches 3-way 2x10  4#  HEP   ABCs 2x10  4# HEP         Prone Bench      Rows 2x10  8#    Extension 2x10  8#    Scaption 2x10  5#    Flexion 2x10  5#      HAB (External rotation) 2x10  5#          Standing       Shoulder flex to 90  2x10  5# HEP   Shoulder Scaption to 90  2x10  5# HEP   Shoulder Abduction to 90  2x10  5#          Resistance bands      Retraction  x20 Purple HEP   Extension  x20 Purple HEP   Triceps   x20 Purple HEP   Biceps  x20 Purple HEP   IR  x20 Purple    ER  x20 Purple                Ball on wall  x20 Red  All Directions   Ball wall taps  x5 Red  Half Arc   Wall push up  x20     Overhead pres  x10 AROM                        Treatment

## 2025-07-02 ENCOUNTER — OFFICE VISIT (OUTPATIENT)
Dept: ORTHOPEDIC SURGERY | Age: 60
End: 2025-07-02

## 2025-07-02 ENCOUNTER — TELEPHONE (OUTPATIENT)
Dept: ORTHOPEDIC SURGERY | Age: 60
End: 2025-07-02

## 2025-07-02 VITALS — WEIGHT: 140 LBS | HEIGHT: 64 IN | BODY MASS INDEX: 23.9 KG/M2 | RESPIRATION RATE: 14 BRPM

## 2025-07-02 DIAGNOSIS — Z98.890 STATUS POST ROTATOR CUFF REPAIR: Primary | ICD-10-CM

## 2025-07-02 NOTE — PROGRESS NOTES
Procedure: Left shoulder arthroscopic rotator cuff repair with Bio brace graft augmentation, and biceps tenodesis  Date of procedure: 2/7/25    HPI:  Margaret Barbour is a 60 y.o. female who is approximately 5 months status post aforementioned procedure.  She states that she is doing well really not having any pain in her shoulder.  She feels that she is getting stronger but still has some limitation with overhead reaching.    Physical examination:  Evaluation of patient's left shoulder and upper extremity demonstrates her to have approximately 125-130 degrees of active shoulder elevation 145 degrees of abduction and 55 degrees of external rotation and internal rotation to L1.  On the contralateral side she has 140 degrees of active shoulder elevation 150 degrees of abduction 75 degrees of external rotation and internal rotation to T12.  5/5 muscle strength with resisted external and internal rotation and with supraspinatus testing.    Impression and plan:  Margaret Barbour is a 60 y.o. female who is approximately 5 months status post an arthroscopic left shoulder rotator cuff repair with Bio brace graft augmentation, and biceps tenodesis.  She is doing very well at this time.  She was encouraged to continue on with her home exercise program from physical therapy.  She can continue to gradually increase use of this arm as she feels comfortable.  Paperwork was filled out today releasing her to return to work on 7/7/2025 without restriction.  I will see her back for reevaluation in 6 months but she may return or call earlier with questions end or concerns.

## 2025-07-02 NOTE — TELEPHONE ENCOUNTER
Patient provided return to work paperwork at her 7/2/25 office visit with Dr. Winters. Return to work with no restrictions form completed with patient's return to work date listed as 7/7/25. Patient was provided hard copy in office for her records. Patient informed that form was scanned into media and will also be e-faxed to [employer]. Patient voices understanding.

## 2025-07-03 ENCOUNTER — HOSPITAL ENCOUNTER (OUTPATIENT)
Dept: PHYSICAL THERAPY | Facility: CLINIC | Age: 60
Setting detail: THERAPIES SERIES
Discharge: HOME | End: 2025-07-03
Attending: ORTHOPAEDIC SURGERY
Payer: COMMERCIAL

## 2025-07-03 PROCEDURE — 97110 THERAPEUTIC EXERCISES: CPT

## 2025-07-03 NOTE — FLOWSHEET NOTE
[x] [facility]  Outpatient Rehabilitation &  Therapy  [address]  P: [phone]  F: [phone]     Physical Therapy Daily Treatment Note    Date:  7/3/2025  Patient Name:  Margaret Barbour    :  1965  MRN: [id number]  Physician: Jelena Robbins                       Insurance: [employer] [facility] [employer] - 26 remain of [employer]vs  Medical Diagnosis: R39.15 (ICD-10-CM) - Urinary urgency   Rehab Codes: R27.8, R29.3, M62.81, N39.46, M99.05  Onset Date: 3/18/25     Next 's appt.: 25  Visit# / total visits:     Cancels/No Shows: 0    Subjective:    Pain:  [] Yes  [x] No Location:  N/A  Pain Rating: (0-10 scale) 0/10  Pain altered Tx:  [x] No  [] Yes  Action:    Comments:  Pt feels ready for discharge at this time. Is returning to work on Monday. Rates 90% improvement with PFPT. Feels confident to proceed with her HEP.    Objective:  Modalities:   Precautions:   Exercises: [employer] Access Code: [email or web address]  KT= Kinesiotape  PB= pelvic brace (DB-exhale+ TA contraction + kegel)  DB= diaphragmatic breathing  Exercise Reps/ Time Weight/ Level Comments   Pelvic model explanation & education      PF function  3 layers  Analogies - IAP (core cannister, juice box, trampoline)  Diaphragm & pelvic floor relationship (visual aid)     body scanning every 2 hrs for tension   exhale with effort  use pelvic brace (exhale, core contraction & pelvic floor muscle lift) in high pressure moments               Supine            Diaphragmatic breathing  HEP       Transversus Abdominis Bracing with Pelvic Floor Contraction  10x10\"   Added ball squeeze   Increased hold time 7/3   DKTC with stability ball and PB 10x Small red ball Added 5/15  Added with head lift    Hooklying Hamstring Stretch with Strap  1' ea       Happy Baby with Pelvic Floor Lengthening HEP    pt modifies on the wall at home   Bridges with PB 10x  10x Ball  Plum  Progressed resistance /8   HS bridges on swiss ball with PB 10x

## 2025-07-22 ENCOUNTER — APPOINTMENT (OUTPATIENT)
Dept: PHYSICAL THERAPY | Facility: CLINIC | Age: 60
End: 2025-07-22
Attending: ORTHOPAEDIC SURGERY
Payer: COMMERCIAL

## (undated) DEVICE — Device: Brand: DEFENDO VALVE AND CONNECTOR KIT

## (undated) DEVICE — DRESSING,GAUZE,XEROFORM,CURAD,1"X8",ST: Brand: CURAD

## (undated) DEVICE — STRAP,POSITIONING,KNEE/BODY,FOAM,4X60": Brand: MEDLINE

## (undated) DEVICE — GLOVE SURG SZ 8 L12IN THK75MIL DK GRN LTX FREE

## (undated) DEVICE — SUTURE SUTTAPE L40IN DIA1.3MM NONABSORBABLE WHT BLU L26.5MM AR7500

## (undated) DEVICE — POUCH FLD 36X29IN SHLDR HVY LO GLARE MATTE FINISH FLM 2 SUCT

## (undated) DEVICE — SOLUTION IRRIG 3000ML LAC RINGERS ARTHROMTC PLAS CONT

## (undated) DEVICE — STAZ ENDO KIT: Brand: MEDLINE INDUSTRIES, INC.

## (undated) DEVICE — 3M™ STERI-DRAPE™ INSTRUMENT POUCH 1018L: Brand: STERI-DRAPE™

## (undated) DEVICE — CATHETER,URETHRAL,REDRUBBER,STRL,12FR: Brand: MEDLINE INDUSTRIES, INC.

## (undated) DEVICE — BLOCK BITE 60FR RUBBER ADLT DENTAL

## (undated) DEVICE — APPLICATOR MEDICATED 26 CC SOLUTION HI LT ORNG CHLORAPREP

## (undated) DEVICE — BITEBLOCK ENDOSCP 60FR MAXI WHT POLYETH STURDY W/ VELC WVN

## (undated) DEVICE — SUTURE FIBERWIRE SZ 2 W/ TAPERED NEEDLE BLUE L38IN NONABSORB BLU L26.5MM 1/2 CIRCLE AR7200

## (undated) DEVICE — JELLY,LUBE,STERILE,FLIP TOP,TUBE,2-OZ: Brand: MEDLINE

## (undated) DEVICE — MHPB ARTHROSCOPY PACK: Brand: MEDLINE INDUSTRIES, INC.

## (undated) DEVICE — ASPIRATOR FLR L72IN SUCT TB W/ 1 DETACH FISH STK PUSH HNDL

## (undated) DEVICE — GAUZE,SPONGE,4"X4",16PLY,STRL,LF,10/TRAY: Brand: MEDLINE

## (undated) DEVICE — SHEET, ORTHO, SPLIT, STERILE: Brand: MEDLINE

## (undated) DEVICE — KIT CHAIR TRIMANO FOAM W/ SUPP ARM DRP ERGONOMICALLY DESIGNED

## (undated) DEVICE — NEEDLE SCORPION HD MEGA LOADER

## (undated) DEVICE — MEDICINE CUP, GRADUATED, STER: Brand: MEDLINE

## (undated) DEVICE — GARMENT,MEDLINE,DVT,INT,CALF,MED, GEN2: Brand: MEDLINE

## (undated) DEVICE — 3M™ STERI-DRAPE™ U-DRAPE 1015: Brand: STERI-DRAPE™

## (undated) DEVICE — GLOVE ORANGE PI 7   MSG9070

## (undated) DEVICE — PROTECTOR ULN NRV PUR FOAM HK LOOP STRP ANATOMICALLY

## (undated) DEVICE — FORCEPS BX L240CM JAW DIA2.8MM L CAP W/ NDL MIC MESH TOOTH

## (undated) DEVICE — NEPTUNE E-SEP SMOKE EVACUATION PENCIL, COATED, 70MM BLADE, PUSH BUTTON SWITCH: Brand: NEPTUNE E-SEP

## (undated) DEVICE — BLANKET WRM W40.2XL55.9IN IORT LO BODY + MISTRAL AIR

## (undated) DEVICE — GLOVE SURG SZ 75 L12IN THK75MIL DK GRN LTX FREE

## (undated) DEVICE — PACK PROCEDURE SURG T

## (undated) DEVICE — CUP MED 1OZ CLR POLYPR FEED GRAD W/O LID

## (undated) DEVICE — TUBING PMP L16FT MAIN DISP FOR AR-6400 AR-6475 Â€“ ORDER MULTIPLES OF 10 EACH

## (undated) DEVICE — CO2 CANNULA,SUPERSOFT, ADLT,7'O2,7'CO2: Brand: MEDLINE

## (undated) DEVICE — ADAPTER TBNG LUER STUB 15 GA INTMED

## (undated) DEVICE — DRESSING TRNSPAR W5XL4.5IN FLM SHT SEMIPERMEABLE WIND

## (undated) DEVICE — ELECTRODE PT RET AD L9FT HI MOIST COND ADH HYDRGEL CORDED

## (undated) DEVICE — SOLUTION IRRIG 1000ML 0.9% SOD CHL USP POUR PLAS BTL

## (undated) DEVICE — BASIN EMSIS 700ML GRAPHITE PLAS KID SHP GRAD

## (undated) DEVICE — FORCEPS BX L240CM WRK CHN 2.8MM STD CAP W/ NDL MIC MESH

## (undated) DEVICE — Device

## (undated) DEVICE — BLADE ES ELASTOMERIC COAT INSUL DURABLE BEND UPTO 90DEG

## (undated) DEVICE — CANNULA ARTHSCP L3CM ID8MM DBL DAM 1 PC MOLD LO PROF FLNG

## (undated) DEVICE — PROBE ABLAT XL 90DEG ASPIR BPLR RF 1 PC ELECTRD ERGO HNDL

## (undated) DEVICE — GLOVE ORANGE PI 8   MSG9080

## (undated) DEVICE — GOWN,AURORA,NONREINFORCED,LARGE: Brand: MEDLINE

## (undated) DEVICE — CANNULA ARTHSCP L4CM DIA8MM PASSPRT BTTN

## (undated) DEVICE — ESOPHAGEAL/PYLORIC WIREGUIDED BALLOON DILATATION CATHETER: Brand: CRE WIREGUIDED

## (undated) DEVICE — 1010 S-DRAPE TOWEL DRAPE 10/BX: Brand: STERI-DRAPE™

## (undated) DEVICE — BLADE SHV L13CM DIA4MM BNE CUT AGG DEB COOLCUT

## (undated) DEVICE — SUTURE PROL SZ 3-0 L18IN NONABSORBABLE BLU L30MM PS-1 3/8 8663G